# Patient Record
Sex: FEMALE | Race: OTHER | HISPANIC OR LATINO | Employment: FULL TIME | URBAN - METROPOLITAN AREA
[De-identification: names, ages, dates, MRNs, and addresses within clinical notes are randomized per-mention and may not be internally consistent; named-entity substitution may affect disease eponyms.]

---

## 2023-03-22 LAB
ALANINE AMINOTRANSFERASE (SGPT) (U/L) IN SER/PLAS: 12 U/L (ref 7–45)
ALBUMIN (G/DL) IN SER/PLAS: 4.1 G/DL (ref 3.4–5)
ALKALINE PHOSPHATASE (U/L) IN SER/PLAS: 77 U/L (ref 33–110)
ANION GAP IN SER/PLAS: 14 MMOL/L (ref 10–20)
ASPARTATE AMINOTRANSFERASE (SGOT) (U/L) IN SER/PLAS: 12 U/L (ref 9–39)
BASOPHILS (10*3/UL) IN BLOOD BY AUTOMATED COUNT: 0.04 X10E9/L (ref 0–0.1)
BASOPHILS/100 LEUKOCYTES IN BLOOD BY AUTOMATED COUNT: 0.5 % (ref 0–2)
BILIRUBIN TOTAL (MG/DL) IN SER/PLAS: 0.5 MG/DL (ref 0–1.2)
CALCIUM (MG/DL) IN SER/PLAS: 9.8 MG/DL (ref 8.6–10.6)
CARBON DIOXIDE, TOTAL (MMOL/L) IN SER/PLAS: 23 MMOL/L (ref 21–32)
CD3+CD4+ ABSOLUTE: 1.19 X10E9/L (ref 0.35–2.74)
CD3+CD8+ ABSOLUTE: 1.03 X10E9/L (ref 0.08–1.49)
CD4/CD8 RATIO: 1.16 (ref 1–3.5)
CD45%: 100 %
CHLORIDE (MMOL/L) IN SER/PLAS: 106 MMOL/L (ref 98–107)
CHOLESTEROL (MG/DL) IN SER/PLAS: 213 MG/DL (ref 0–199)
CHOLESTEROL IN HDL (MG/DL) IN SER/PLAS: 35.5 MG/DL
CHOLESTEROL/HDL RATIO: 6
CP CD3+CD4+%: 44 % (ref 29–57)
CP CD3+CD8+%: 38 % (ref 7–31)
CREATININE (MG/DL) IN SER/PLAS: 0.91 MG/DL (ref 0.5–1.05)
EOSINOPHILS (10*3/UL) IN BLOOD BY AUTOMATED COUNT: 0.12 X10E9/L (ref 0–0.7)
EOSINOPHILS/100 LEUKOCYTES IN BLOOD BY AUTOMATED COUNT: 1.5 % (ref 0–6)
ERYTHROCYTE DISTRIBUTION WIDTH (RATIO) BY AUTOMATED COUNT: 12.4 % (ref 11.5–14.5)
ERYTHROCYTE MEAN CORPUSCULAR HEMOGLOBIN CONCENTRATION (G/DL) BY AUTOMATED: 33.4 G/DL (ref 32–36)
ERYTHROCYTE MEAN CORPUSCULAR VOLUME (FL) BY AUTOMATED COUNT: 98 FL (ref 80–100)
ERYTHROCYTES (10*6/UL) IN BLOOD BY AUTOMATED COUNT: 4.15 X10E12/L (ref 4–5.2)
ESTIMATED AVERAGE GLUCOSE FOR HBA1C: 100 MG/DL
FMETH: ABNORMAL
FSIT1: ABNORMAL
GFR FEMALE: 79 ML/MIN/1.73M2
GLUCOSE (MG/DL) IN SER/PLAS: 78 MG/DL (ref 74–99)
HEMATOCRIT (%) IN BLOOD BY AUTOMATED COUNT: 40.7 % (ref 36–46)
HEMOGLOBIN (G/DL) IN BLOOD: 13.6 G/DL (ref 12–16)
HEMOGLOBIN A1C/HEMOGLOBIN TOTAL IN BLOOD: 5.1 %
IMMATURE GRANULOCYTES/100 LEUKOCYTES IN BLOOD BY AUTOMATED COUNT: 0.2 % (ref 0–0.9)
LDL: 104 MG/DL (ref 0–99)
LEUKOCYTES (10*3/UL) IN BLOOD BY AUTOMATED COUNT: 8.1 X10E9/L (ref 4.4–11.3)
LYMPHOCYTES (10*3/UL) IN BLOOD BY AUTOMATED COUNT: 2.7 X10E9/L (ref 1.2–4.8)
LYMPHOCYTES/100 LEUKOCYTES IN BLOOD BY AUTOMATED COUNT: 33.3 % (ref 13–44)
MONOCYTES (10*3/UL) IN BLOOD BY AUTOMATED COUNT: 0.63 X10E9/L (ref 0.1–1)
MONOCYTES/100 LEUKOCYTES IN BLOOD BY AUTOMATED COUNT: 7.8 % (ref 2–10)
NEUTROPHILS (10*3/UL) IN BLOOD BY AUTOMATED COUNT: 4.6 X10E9/L (ref 1.2–7.7)
NEUTROPHILS/100 LEUKOCYTES IN BLOOD BY AUTOMATED COUNT: 56.7 % (ref 40–80)
NON HDL CHOLESTEROL: 178 MG/DL
NRBC (PER 100 WBCS) BY AUTOMATED COUNT: 0 /100 WBC (ref 0–0)
PLATELETS (10*3/UL) IN BLOOD AUTOMATED COUNT: 348 X10E9/L (ref 150–450)
POTASSIUM (MMOL/L) IN SER/PLAS: 3.9 MMOL/L (ref 3.5–5.3)
PROTEIN TOTAL: 6.7 G/DL (ref 6.4–8.2)
SODIUM (MMOL/L) IN SER/PLAS: 139 MMOL/L (ref 136–145)
SYPHILIS TOTAL AB: NONREACTIVE
TRIGLYCERIDE (MG/DL) IN SER/PLAS: 366 MG/DL (ref 0–149)
UREA NITROGEN (MG/DL) IN SER/PLAS: 11 MG/DL (ref 6–23)
VLDL: 73 MG/DL (ref 0–40)

## 2023-03-23 LAB
CHLAMYDIA TRACH., AMPLIFIED: NEGATIVE
HIV-1 RNA PCR VIRAL LOAD LOG: NORMAL LOG10 CPY/ML
HIV-1 RNA VIRAL LOAD: NOT DETECTED COPIES/ML
N. GONORRHEA, AMPLIFIED: NEGATIVE
TRICHOMONAS VAGINALIS: NEGATIVE

## 2023-08-15 PROBLEM — G89.29 CHRONIC LOW BACK PAIN: Status: ACTIVE | Noted: 2023-08-15

## 2023-08-15 PROBLEM — R45.4 IRRITABILITY AND ANGER: Status: ACTIVE | Noted: 2023-08-15

## 2023-08-15 PROBLEM — G47.00 INSOMNIA: Status: ACTIVE | Noted: 2023-08-15

## 2023-08-15 PROBLEM — F32.A DEPRESSION: Status: ACTIVE | Noted: 2023-08-15

## 2023-08-15 PROBLEM — M94.20 CHONDROMALACIA: Status: ACTIVE | Noted: 2023-08-15

## 2023-08-15 PROBLEM — F41.0 PANIC ATTACKS: Status: ACTIVE | Noted: 2023-08-15

## 2023-08-15 PROBLEM — M79.7 FIBROMYALGIA: Status: ACTIVE | Noted: 2023-08-15

## 2023-08-15 PROBLEM — R20.2 NUMBNESS AND TINGLING OF RIGHT THUMB: Status: ACTIVE | Noted: 2023-08-15

## 2023-08-15 PROBLEM — M25.562 KNEE PAIN, BILATERAL: Status: ACTIVE | Noted: 2023-08-15

## 2023-08-15 PROBLEM — M25.561 KNEE PAIN, BILATERAL: Status: ACTIVE | Noted: 2023-08-15

## 2023-08-15 PROBLEM — R41.89 ALTERATION IN COGNITION: Status: ACTIVE | Noted: 2023-08-15

## 2023-08-15 PROBLEM — E66.9 OBESITY: Status: ACTIVE | Noted: 2023-08-15

## 2023-08-15 PROBLEM — G56.00 CARPAL TUNNEL SYNDROME: Status: ACTIVE | Noted: 2023-08-15

## 2023-08-15 PROBLEM — F42.8 OBSESSIVE THINKING: Status: ACTIVE | Noted: 2023-08-15

## 2023-08-15 PROBLEM — M54.9 BACK PAIN: Status: ACTIVE | Noted: 2023-08-15

## 2023-08-15 PROBLEM — E66.01 MORBIDLY OBESE (MULTI): Status: ACTIVE | Noted: 2023-08-15

## 2023-08-15 PROBLEM — F41.9 ANXIETY: Status: ACTIVE | Noted: 2023-08-15

## 2023-08-15 PROBLEM — H52.13 MYOPIA OF BOTH EYES: Status: ACTIVE | Noted: 2023-08-15

## 2023-08-15 PROBLEM — F43.21 FEELING GRIEF: Status: ACTIVE | Noted: 2023-08-15

## 2023-08-15 PROBLEM — G62.9 PERIPHERAL NEUROPATHY: Status: ACTIVE | Noted: 2023-08-15

## 2023-08-15 PROBLEM — R40.4 NONSPECIFIC PAROXYSMAL SPELL: Status: ACTIVE | Noted: 2023-08-15

## 2023-08-15 PROBLEM — M19.90 OSTEOARTHRITIS: Status: ACTIVE | Noted: 2023-08-15

## 2023-08-15 PROBLEM — M54.50 CHRONIC LOW BACK PAIN: Status: ACTIVE | Noted: 2023-08-15

## 2023-08-15 PROBLEM — F43.10 PTSD (POST-TRAUMATIC STRESS DISORDER): Status: ACTIVE | Noted: 2023-08-15

## 2023-08-15 PROBLEM — E78.5 HYPERLIPIDEMIA: Status: ACTIVE | Noted: 2023-08-15

## 2023-08-15 PROBLEM — M54.12 CERVICAL RADICULOPATHY: Status: ACTIVE | Noted: 2023-08-15

## 2023-08-15 PROBLEM — H35.3131 EARLY DRY STAGE NONEXUDATIVE AGE-RELATED MACULAR DEGENERATION OF BOTH EYES: Status: ACTIVE | Noted: 2023-08-15

## 2023-08-15 PROBLEM — H53.9 VISION CHANGES: Status: ACTIVE | Noted: 2023-08-15

## 2023-08-15 PROBLEM — F44.5 PSYCHIATRIC PSEUDOSEIZURE: Status: ACTIVE | Noted: 2023-08-15

## 2023-08-15 PROBLEM — B20 HIV INFECTION (MULTI): Status: ACTIVE | Noted: 2023-08-15

## 2023-08-15 PROBLEM — R20.0 NUMBNESS AND TINGLING OF RIGHT THUMB: Status: ACTIVE | Noted: 2023-08-15

## 2023-08-15 PROBLEM — H52.03 HYPERMETROPIA OF BOTH EYES: Status: ACTIVE | Noted: 2023-08-15

## 2023-08-15 PROBLEM — F31.81 BIPOLAR 2 DISORDER (MULTI): Status: ACTIVE | Noted: 2023-08-15

## 2023-08-15 PROBLEM — Z21 HIV INFECTION: Status: ACTIVE | Noted: 2023-08-15

## 2023-08-15 PROBLEM — N93.9 ABNORMAL UTERINE AND VAGINAL BLEEDING, UNSPECIFIED: Status: ACTIVE | Noted: 2023-08-15

## 2023-08-15 PROBLEM — F44.9 DISASSOCIATION DISORDER: Status: ACTIVE | Noted: 2023-08-15

## 2023-08-15 PROBLEM — M54.2 CERVICAL PAIN (NECK): Status: ACTIVE | Noted: 2023-08-15

## 2023-08-15 RX ORDER — IBUPROFEN 600 MG/1
1 TABLET ORAL
COMMUNITY
Start: 2016-05-10 | End: 2024-01-19 | Stop reason: SDUPTHER

## 2023-08-15 RX ORDER — HYDROXYZINE HYDROCHLORIDE 25 MG/1
1 TABLET, FILM COATED ORAL 3 TIMES DAILY PRN
COMMUNITY
Start: 2020-03-27 | End: 2024-04-26 | Stop reason: SDUPTHER

## 2023-08-15 RX ORDER — TOPIRAMATE 25 MG/1
25 TABLET ORAL
COMMUNITY
Start: 2022-10-06

## 2023-08-15 RX ORDER — CYCLOBENZAPRINE HCL 5 MG
1 TABLET ORAL 3 TIMES DAILY PRN
COMMUNITY
Start: 2019-02-08 | End: 2024-01-19 | Stop reason: SDUPTHER

## 2023-08-15 RX ORDER — BREXPIPRAZOLE 3 MG/1
3 TABLET ORAL
COMMUNITY
Start: 2022-06-16 | End: 2023-11-14 | Stop reason: DRUGHIGH

## 2023-08-15 RX ORDER — BICTEGRAVIR SODIUM, EMTRICITABINE, AND TENOFOVIR ALAFENAMIDE FUMARATE 50; 200; 25 MG/1; MG/1; MG/1
1 TABLET ORAL DAILY
COMMUNITY
Start: 2018-12-07 | End: 2023-12-08

## 2023-08-15 RX ORDER — PREDNISONE 20 MG/1
20 TABLET ORAL
COMMUNITY
Start: 2021-07-01 | End: 2023-10-21 | Stop reason: ALTCHOICE

## 2023-08-15 RX ORDER — POLYETHYLENE GLYCOL 3350 17 G/17G
17 POWDER, FOR SOLUTION ORAL
COMMUNITY
Start: 2021-01-25

## 2023-08-15 RX ORDER — PNV NO.95/FERROUS FUM/FOLIC AC 28MG-0.8MG
1 TABLET ORAL
COMMUNITY
Start: 2020-10-21 | End: 2023-11-14 | Stop reason: WASHOUT

## 2023-08-15 RX ORDER — LIDOCAINE 50 MG/G
1 PATCH TOPICAL EVERY 12 HOURS
COMMUNITY
Start: 2023-06-08

## 2023-08-15 RX ORDER — ACETAMINOPHEN 325 MG/1
2 TABLET ORAL EVERY 6 HOURS
COMMUNITY
Start: 2021-06-11

## 2023-08-15 RX ORDER — TRAZODONE HYDROCHLORIDE 50 MG/1
1-2 TABLET ORAL DAILY
COMMUNITY
End: 2023-11-14 | Stop reason: WASHOUT

## 2023-08-15 RX ORDER — DIAZEPAM 5 MG/1
1 TABLET ORAL AS NEEDED
COMMUNITY
Start: 2022-07-14 | End: 2023-11-16 | Stop reason: WASHOUT

## 2023-08-15 RX ORDER — ALBUTEROL SULFATE 90 UG/1
1-2 AEROSOL, METERED RESPIRATORY (INHALATION)
COMMUNITY
Start: 2014-11-10

## 2023-08-15 RX ORDER — SERTRALINE HYDROCHLORIDE 100 MG/1
1 TABLET, FILM COATED ORAL DAILY
COMMUNITY
Start: 2022-11-10 | End: 2023-12-14 | Stop reason: SDUPTHER

## 2023-08-15 RX ORDER — OXYCODONE HYDROCHLORIDE 5 MG/1
1 TABLET ORAL EVERY 6 HOURS PRN
COMMUNITY
Start: 2021-06-11 | End: 2023-10-21 | Stop reason: ALTCHOICE

## 2023-08-21 PROBLEM — H52.4 MYOPIA WITH PRESBYOPIA: Status: ACTIVE | Noted: 2023-08-21

## 2023-08-21 PROBLEM — H11.442 CONJUNCTIVAL CYST OF LEFT EYE: Status: ACTIVE | Noted: 2023-08-21

## 2023-08-21 PROBLEM — Z79.899 HIGH RISK MEDICATION USE: Status: ACTIVE | Noted: 2023-08-21

## 2023-08-21 PROBLEM — E55.9 VITAMIN D DEFICIENCY: Status: ACTIVE | Noted: 2023-08-21

## 2023-08-21 PROBLEM — H52.10 MYOPIA WITH PRESBYOPIA: Status: ACTIVE | Noted: 2023-08-21

## 2023-09-26 PROBLEM — F44.5 DISSOCIATIVE CONVULSIONS: Status: ACTIVE | Noted: 2021-03-25

## 2023-09-26 PROBLEM — N93.9 ABNORMAL UTERINE BLEEDING: Status: ACTIVE | Noted: 2021-03-25

## 2023-09-26 PROBLEM — M25.521 RIGHT ELBOW PAIN: Status: ACTIVE | Noted: 2022-11-25

## 2023-09-26 PROBLEM — M77.11 LATERAL EPICONDYLITIS OF RIGHT ELBOW: Status: ACTIVE | Noted: 2022-11-25

## 2023-09-26 PROBLEM — B20 HUMAN IMMUNODEFICIENCY VIRUS INFECTION (MULTI): Status: ACTIVE | Noted: 2021-03-25

## 2023-09-26 PROBLEM — S93.409A SPRAIN OF ANKLE: Status: ACTIVE | Noted: 2020-05-22

## 2023-09-26 PROBLEM — Z21 HUMAN IMMUNODEFICIENCY VIRUS INFECTION: Status: ACTIVE | Noted: 2021-03-25

## 2023-09-26 PROBLEM — M54.9 BACKACHE: Status: ACTIVE | Noted: 2023-08-15

## 2023-09-26 PROBLEM — R45.851 SUICIDAL IDEATION: Status: ACTIVE | Noted: 2017-06-05

## 2023-09-26 PROBLEM — M19.90 OSTEOARTHROSIS: Status: ACTIVE | Noted: 2023-08-15

## 2023-09-26 RX ORDER — NAPROXEN 500 MG/1
500 TABLET ORAL
COMMUNITY
Start: 2020-03-09

## 2023-09-28 LAB
ALANINE AMINOTRANSFERASE (SGPT) (U/L) IN SER/PLAS: 33 U/L (ref 7–45)
ALBUMIN (G/DL) IN SER/PLAS: 4.1 G/DL (ref 3.4–5)
ALKALINE PHOSPHATASE (U/L) IN SER/PLAS: 76 U/L (ref 33–110)
ANION GAP IN SER/PLAS: 13 MMOL/L (ref 10–20)
ASPARTATE AMINOTRANSFERASE (SGOT) (U/L) IN SER/PLAS: 21 U/L (ref 9–39)
BASOPHILS (10*3/UL) IN BLOOD BY AUTOMATED COUNT: 0.04 X10E9/L (ref 0–0.1)
BASOPHILS/100 LEUKOCYTES IN BLOOD BY AUTOMATED COUNT: 0.6 % (ref 0–2)
BILIRUBIN TOTAL (MG/DL) IN SER/PLAS: 0.5 MG/DL (ref 0–1.2)
CALCIDIOL (25 OH VITAMIN D3) (NG/ML) IN SER/PLAS: 22 NG/ML
CALCIUM (MG/DL) IN SER/PLAS: 9.8 MG/DL (ref 8.6–10.6)
CARBON DIOXIDE, TOTAL (MMOL/L) IN SER/PLAS: 27 MMOL/L (ref 21–32)
CHLAMYDIA TRACH., AMPLIFIED: NEGATIVE
CHLORIDE (MMOL/L) IN SER/PLAS: 105 MMOL/L (ref 98–107)
CHOLESTEROL (MG/DL) IN SER/PLAS: 272 MG/DL (ref 0–199)
CHOLESTEROL IN HDL (MG/DL) IN SER/PLAS: 36.9 MG/DL
CHOLESTEROL/HDL RATIO: 7.4
CREATININE (MG/DL) IN SER/PLAS: 0.85 MG/DL (ref 0.5–1.05)
EOSINOPHILS (10*3/UL) IN BLOOD BY AUTOMATED COUNT: 0.22 X10E9/L (ref 0–0.7)
EOSINOPHILS/100 LEUKOCYTES IN BLOOD BY AUTOMATED COUNT: 3.2 % (ref 0–6)
ERYTHROCYTE DISTRIBUTION WIDTH (RATIO) BY AUTOMATED COUNT: 11.8 % (ref 11.5–14.5)
ERYTHROCYTE MEAN CORPUSCULAR HEMOGLOBIN CONCENTRATION (G/DL) BY AUTOMATED: 33.2 G/DL (ref 32–36)
ERYTHROCYTE MEAN CORPUSCULAR VOLUME (FL) BY AUTOMATED COUNT: 100 FL (ref 80–100)
ERYTHROCYTES (10*6/UL) IN BLOOD BY AUTOMATED COUNT: 4.11 X10E12/L (ref 4–5.2)
GFR FEMALE: 86 ML/MIN/1.73M2
GLUCOSE (MG/DL) IN SER/PLAS: 106 MG/DL (ref 74–99)
HEMATOCRIT (%) IN BLOOD BY AUTOMATED COUNT: 41 % (ref 36–46)
HEMOGLOBIN (G/DL) IN BLOOD: 13.6 G/DL (ref 12–16)
IMMATURE GRANULOCYTES/100 LEUKOCYTES IN BLOOD BY AUTOMATED COUNT: 0.3 % (ref 0–0.9)
LDL: ABNORMAL MG/DL (ref 0–99)
LEUKOCYTES (10*3/UL) IN BLOOD BY AUTOMATED COUNT: 6.8 X10E9/L (ref 4.4–11.3)
LYMPHOCYTES (10*3/UL) IN BLOOD BY AUTOMATED COUNT: 2.98 X10E9/L (ref 1.2–4.8)
LYMPHOCYTES/100 LEUKOCYTES IN BLOOD BY AUTOMATED COUNT: 43.6 % (ref 13–44)
MONOCYTES (10*3/UL) IN BLOOD BY AUTOMATED COUNT: 0.47 X10E9/L (ref 0.1–1)
MONOCYTES/100 LEUKOCYTES IN BLOOD BY AUTOMATED COUNT: 6.9 % (ref 2–10)
N. GONORRHEA, AMPLIFIED: NEGATIVE
NEUTROPHILS (10*3/UL) IN BLOOD BY AUTOMATED COUNT: 3.1 X10E9/L (ref 1.2–7.7)
NEUTROPHILS/100 LEUKOCYTES IN BLOOD BY AUTOMATED COUNT: 45.4 % (ref 40–80)
NON HDL CHOLESTEROL: 235 MG/DL
NRBC (PER 100 WBCS) BY AUTOMATED COUNT: 0 /100 WBC (ref 0–0)
PLATELETS (10*3/UL) IN BLOOD AUTOMATED COUNT: 293 X10E9/L (ref 150–450)
POTASSIUM (MMOL/L) IN SER/PLAS: 3.8 MMOL/L (ref 3.5–5.3)
PROTEIN TOTAL: 6.6 G/DL (ref 6.4–8.2)
SODIUM (MMOL/L) IN SER/PLAS: 141 MMOL/L (ref 136–145)
TRICHOMONAS VAGINALIS: NEGATIVE
TRIGLYCERIDE (MG/DL) IN SER/PLAS: 441 MG/DL (ref 0–149)
UREA NITROGEN (MG/DL) IN SER/PLAS: 12 MG/DL (ref 6–23)
VLDL: ABNORMAL MG/DL (ref 0–40)

## 2023-09-29 LAB
HIV-1 RNA PCR VIRAL LOAD LOG: NORMAL LOG10 CPY/ML
HIV-1 RNA VIRAL LOAD: NOT DETECTED COPIES/ML

## 2023-09-29 RX ORDER — ERGOCALCIFEROL 1.25 MG/1
1.25 CAPSULE ORAL
COMMUNITY

## 2023-09-30 LAB
CD3+CD4+ ABSOLUTE: 1.28 X10E9/L (ref 0.35–2.74)
CD3+CD8+ ABSOLUTE: 1.16 X10E9/L (ref 0.08–1.49)
CD4/CD8 RATIO: 1.1 (ref 1–3.5)
CD45%: 100 %
CP CD3+CD4+%: 43 % (ref 29–57)
CP CD3+CD8+%: 39 % (ref 7–31)
FMETH: ABNORMAL
FSIT1: ABNORMAL

## 2023-10-21 ENCOUNTER — OFFICE VISIT (OUTPATIENT)
Dept: OPHTHALMOLOGY | Facility: CLINIC | Age: 43
End: 2023-10-21
Payer: COMMERCIAL

## 2023-10-21 DIAGNOSIS — Z21 ASYMPTOMATIC HIV INFECTION, WITH NO HISTORY OF HIV-RELATED ILLNESS (MULTI): ICD-10-CM

## 2023-10-21 DIAGNOSIS — H35.3132 INTERMEDIATE STAGE NONEXUDATIVE AGE-RELATED MACULAR DEGENERATION OF BOTH EYES: Primary | ICD-10-CM

## 2023-10-21 DIAGNOSIS — H52.03 HYPEROPIA OF BOTH EYES: ICD-10-CM

## 2023-10-21 DIAGNOSIS — H52.223 REGULAR ASTIGMATISM OF BOTH EYES: ICD-10-CM

## 2023-10-21 DIAGNOSIS — H52.4 PRESBYOPIA: ICD-10-CM

## 2023-10-21 DIAGNOSIS — H11.442 CONJUNCTIVAL CYST OF LEFT EYE: ICD-10-CM

## 2023-10-21 PROCEDURE — 92134 CPTRZ OPH DX IMG PST SGM RTA: CPT | Mod: BILATERAL PROCEDURE | Performed by: OPTOMETRIST

## 2023-10-21 PROCEDURE — 99214 OFFICE O/P EST MOD 30 MIN: CPT | Performed by: OPTOMETRIST

## 2023-10-21 RX ORDER — MV-MIN/FA/VIT K/LUTEIN/ZEAXANT 200MCG-5MG
1 CAPSULE ORAL 2 TIMES DAILY
COMMUNITY

## 2023-10-21 ASSESSMENT — CONF VISUAL FIELD
OS_NORMAL: 1
OD_SUPERIOR_TEMPORAL_RESTRICTION: 0
OD_SUPERIOR_NASAL_RESTRICTION: 0
OS_INFERIOR_TEMPORAL_RESTRICTION: 0
METHOD: COUNTING FINGERS
OS_SUPERIOR_TEMPORAL_RESTRICTION: 0
OD_INFERIOR_NASAL_RESTRICTION: 0
OD_INFERIOR_TEMPORAL_RESTRICTION: 0
OS_SUPERIOR_NASAL_RESTRICTION: 0
OD_NORMAL: 1
OS_INFERIOR_NASAL_RESTRICTION: 0

## 2023-10-21 ASSESSMENT — TONOMETRY
OD_IOP_MMHG: 15
OS_IOP_MMHG: 16
IOP_METHOD: GOLDMANN APPLANATION

## 2023-10-21 ASSESSMENT — VISUAL ACUITY
OS_SC: 20/20
OD_SC: 20/20
METHOD: SNELLEN - LINEAR

## 2023-10-21 ASSESSMENT — ENCOUNTER SYMPTOMS: EYES NEGATIVE: 1

## 2023-10-21 ASSESSMENT — EXTERNAL EXAM - RIGHT EYE: OD_EXAM: NORMAL

## 2023-10-21 ASSESSMENT — EXTERNAL EXAM - LEFT EYE: OS_EXAM: NORMAL

## 2023-10-21 ASSESSMENT — SLIT LAMP EXAM - LIDS
COMMENTS: NORMAL
COMMENTS: NORMAL

## 2023-10-21 ASSESSMENT — CUP TO DISC RATIO
OS_RATIO: 0.3
OD_RATIO: 0.3

## 2023-10-21 NOTE — PROGRESS NOTES
Assessment/Plan   Diagnoses and all orders for this visit:  Intermediate stage nonexudative age-related macular degeneration of both eyes  -     OCT, Retina - OU - Both Eyes  Discussed imporantance of sun protection outdoors, not smoking, healthy diet and exercise. Gave [employer] grid for at home monitoring. RTC with any changed in vision/on grid. Continue taking AREDs 2 vitamin po bid. RTC 6 months for DFE and OCT macula, optional refraction.  Asymptomatic HIV infection, with no history of HIV-related illness (CMS/HCC)  No retinopathy.  Conjunctival cyst of left eye  Stable. Monitor.  Hyperopia of both eyes  Regular astigmatism of both eyes  Presbyopia  Reprinted spec Rx.

## 2023-11-14 ENCOUNTER — TELEMEDICINE (OUTPATIENT)
Dept: BEHAVIORAL HEALTH | Facility: CLINIC | Age: 43
End: 2023-11-14
Payer: COMMERCIAL

## 2023-11-14 DIAGNOSIS — F42.8 OBSESSIVE THINKING: ICD-10-CM

## 2023-11-14 DIAGNOSIS — F51.04 PSYCHOPHYSIOLOGICAL INSOMNIA: ICD-10-CM

## 2023-11-14 DIAGNOSIS — R45.4 IRRITABILITY AND ANGER: ICD-10-CM

## 2023-11-14 DIAGNOSIS — F31.81 BIPOLAR 2 DISORDER (MULTI): Primary | ICD-10-CM

## 2023-11-14 DIAGNOSIS — F44.9 DISASSOCIATION DISORDER: ICD-10-CM

## 2023-11-14 DIAGNOSIS — F43.10 PTSD (POST-TRAUMATIC STRESS DISORDER): ICD-10-CM

## 2023-11-14 DIAGNOSIS — F41.9 ANXIETY: ICD-10-CM

## 2023-11-14 PROCEDURE — 99214 OFFICE O/P EST MOD 30 MIN: CPT | Performed by: NURSE PRACTITIONER

## 2023-11-14 ASSESSMENT — PATIENT HEALTH QUESTIONNAIRE - PHQ9
2. FEELING DOWN, DEPRESSED OR HOPELESS: SEVERAL DAYS
5. POOR APPETITE OR OVEREATING: SEVERAL DAYS
8. MOVING OR SPEAKING SO SLOWLY THAT OTHER PEOPLE COULD HAVE NOTICED. OR THE OPPOSITE, BEING SO FIGETY OR RESTLESS THAT YOU HAVE BEEN MOVING AROUND A LOT MORE THAN USUAL: SEVERAL DAYS
6. FEELING BAD ABOUT YOURSELF - OR THAT YOU ARE A FAILURE OR HAVE LET YOURSELF OR YOUR FAMILY DOWN: SEVERAL DAYS
10. IF YOU CHECKED OFF ANY PROBLEMS, HOW DIFFICULT HAVE THESE PROBLEMS MADE IT FOR YOU TO DO YOUR WORK, TAKE CARE OF THINGS AT HOME, OR GET ALONG WITH OTHER PEOPLE: EXTREMELY DIFFICULT
7. TROUBLE CONCENTRATING ON THINGS, SUCH AS READING THE NEWSPAPER OR WATCHING TELEVISION: MORE THAN HALF THE DAYS
1. LITTLE INTEREST OR PLEASURE IN DOING THINGS: MORE THAN HALF THE DAYS
3. TROUBLE FALLING OR STAYING ASLEEP OR SLEEPING TOO MUCH: MORE THAN HALF THE DAYS
4. FEELING TIRED OR HAVING LITTLE ENERGY: NEARLY EVERY DAY
9. THOUGHTS THAT YOU WOULD BE BETTER OFF DEAD, OR OF HURTING YOURSELF: NOT AT ALL

## 2023-11-14 ASSESSMENT — ANXIETY QUESTIONNAIRES
4. TROUBLE RELAXING: SEVERAL DAYS
6. BECOMING EASILY ANNOYED OR IRRITABLE: MORE THAN HALF THE DAYS
3. WORRYING TOO MUCH ABOUT DIFFERENT THINGS: MORE THAN HALF THE DAYS
5. BEING SO RESTLESS THAT IT IS HARD TO SIT STILL: SEVERAL DAYS
2. NOT BEING ABLE TO STOP OR CONTROL WORRYING: NEARLY EVERY DAY
7. FEELING AFRAID AS IF SOMETHING AWFUL MIGHT HAPPEN: SEVERAL DAYS
1. FEELING NERVOUS, ANXIOUS, OR ON EDGE: MORE THAN HALF THE DAYS
IF YOU CHECKED OFF ANY PROBLEMS ON THIS QUESTIONNAIRE, HOW DIFFICULT HAVE THESE PROBLEMS MADE IT FOR YOU TO DO YOUR WORK, TAKE CARE OF THINGS AT HOME, OR GET ALONG WITH OTHER PEOPLE: VERY DIFFICULT
GAD7 TOTAL SCORE: 12

## 2023-11-14 NOTE — PROGRESS NOTES
"Adult Ambulatory Psychiatry Progress Note      Assessment/Plan     Impression:  Cherrie Chapa is a 43 y.o. female domiciled  with 2 children, on disability, who presents for follow up with CC of \"I'm ok, but the apartment building, and my brother - I am stressing about that stuff.\"    Plan: Reviewed and agreed that as she was experienced more mood swings, especially eric, to increase Rexulti and leave other medications and treatment plan in place. As patient is on medical THC, she is not going to be prescribed Diazepam going forward. BIN paperwork for apartment building sent in, and then forwarded onto patient to fill out her sections and send onto the apartment management.  Medication: Increases Rexulti 3mg to 4mg at bedtime, continues Zoloft 150mg every day. Discontinuing Diazepam altogether due to being on prescribed medical THC, Atarax 25-50mg as needed for anxiety spikes and for additional sleep aid.    Reviewed r/b/a, possible side effects of the medication. Client is aware about the benefit outweighs the risk.     Diagnoses and all orders for this visit:  Bipolar 2 disorder (CMS/HCC)  -     brexpiprazole 4 mg tablet; Take 4 mg by mouth once daily.  Anxiety  Disassociation disorder  Irritability and anger  Obsessive thinking  PTSD (post-traumatic stress disorder)  Psychophysiological insomnia      Therapy: none    Other: n/a    Patient is reminded that if there is SI to call [phone] and get themselves to the closest ED for evaluation, otherwise contact me for other questions/concerns.     Subjective   HPI:  \"Both the patient, and family and caregivers and guardians as appropriate, were informed of the current need to conduct treatment via telephone. I have confirmed the patient's identity via the following (minimum of three) acceptable identifiers as per  Policy PH-9: , [place] home address.\"     Present Illness - anxiety, irritability and anger, mood changes     Characteristics/Recent psychiatric " "symptoms (pertinent positives and negatives) - reports no change with ongoing conflicts with housing/manager who is wanting to evict her but then will recertify her then flips on wanting to evict her again. Reports still deal with looking for a new apartment, and has  (Cristhian and she reports that he is very nice and very helpful.) assist her with this issue, while trying to stay on top of bills. Reports paying her rent but it is going to the courts and placed in escrow while mediation is being worked on. She does admit to having to deal with her son's ADHD, anxiety issues (as well as getting her son to bed on time and waking him up on time for school but the time change in the fall has helped him), and that is compounding her stress with the issues in housing. Reports her parents are doing ok, but her one brother who has schizophrenia is becoming an issue where he is back to drinking and using drugs and asking for money from their parents and the patient has told them that she will call the police on him if he doesn't stop. Still seeing her therapist Kera every week if not 2x a week 'as she is my lifeline'. Still working on her OCD but is often finding herself forgetting where she puts items, and then will get so anxious because she can't find the items, that she will go 'blank' and disassociates - all of this is to being discussed with her therapist as well. \"I get so overwhelmed so easily and then I glitch, I get scared and I have to talk myself down. The past couple of weeks have been rough for me, as it was impacting my sleep.' Reports having to rely on her Atarax more during the day to calm down her anxiety and then will let me sleep better. Reports lately only averaging 3 hours a night and no naps. Reports noticing she has been more manic lately and feels drinking 'caffeine is messing me up. I am switching to decafe more. I am noticing more irritability as well.' Reports noticing an increase in " thoughts of leading to impulsive decisions and working on trying to catch herself before she actually acts on them, 'and go off the deep end.'      Onset/timeframe - 1 month  Type - anxiety, depression/manic, PTSD  Duration - daily  Aggravating and/or relieving factors/triggers - feeling overwhelmed by too many things going on in her life (fighting apartment people to not be evicted while also trying to get a cat/BIN), dealing with her family/specifically her mentally ill brother, and often finds herself disconnecting from reality more often than usual.  Related symptoms  Treatment and treatment changes (new meds, dosage increases or decreases, med compliance, therapy frequency, etc.) (Past and Recent) - She sees Megan Landry, her therapist twice a week with [facility] - CBT and psychodynamic therapy. Rexulti 3mg @[facility], Zoloft 150mg QD, Diazepam 5mg 1 tablet PRN daily (on hold), Atarax 25-50mg PRN. Topamax 75mg BID (managed by pain management doctor) for neck pain     Issues: Denies SI/HI/AVH.     Still on HIV medications. Dealing with pain from Fibromyalgia and uses her medications appropriately.     Reports her 1 year death anniversary for her close friend had just happened.             Review of Systems   Respiratory: Negative.     Gastrointestinal: Negative.    Genitourinary: Negative.    Musculoskeletal: Negative.    Allergic/Immunologic: Positive for immunocompromised state.       OARRS:  Moody Peoples, APRN-CNP on 11/16/2023 10:36 PM  I have personally reviewed the OARRS report for Cherrie Chapa. I have considered the risks of abuse, dependence, addiction and diversion    Is the patient prescribed a combination of a benzodiazepine and opioid?  No    Last Urine Drug Screen / ordered today: No  No results found for this or any previous visit (from the past 8760 hour(s)).  N/A    Clinical rationale for not completing a Urine Drug Screen: Valium was discontinued as patient is prescribed medical  THC      Controlled Substance Agreement:  Date of the Last Agreement: N/A    Objective   Mental Status Exam:  General Appearance: Well groomed, appropriate eye contact  Attitude/Behavior: Cooperative  Motor: Fidgeting  Speech: Emphatic speech, normal rate and prosody  Gait/Station: Other:(comment) (sitting on couch over ZOOM)  Mood: 'stressed lately'  Affect: Flat, Anxious, Expansive, Congruent with mood and topic of conversation  Thought Process: Flight of ideas, Perseverative, Thought blocking  Thought Associations: Mild loosening of associations  Thought Content: Other: (comment) (worrying about many issues going on in her life at one time and finding herself often more manic and disassociating more frequently.)  Perception: Depersonalization  Sensorium: Alert and oriented to person, place, time and situation  Insight: Fair  Judgement: Fair  Cognition: Cognitively intact to conversational testing with respect to attention, orientation, fund of knowledge, recent and remote memory, and language  Testing: N/A    TIM-7/PHQ-9 scores reviewed: 12, 13 compared to 19, 13 (August 2023) reflecting improved anxiety and stable mood/depression.        Current Medications:  Current Outpatient Medications on File Prior to Visit   Medication Sig Dispense Refill    hydrOXYzine HCL (Atarax) 25 mg tablet Take 1 tablet (25 mg) by mouth 3 times a day as needed.      sertraline (Zoloft) 100 mg tablet Take 1 tablet (100 mg) by mouth once daily. 1 and .5 tablets daily      [DISCONTINUED] diazePAM (Valium) 5 mg tablet Take 1 tablet (5 mg) by mouth if needed for anxiety (anxiety attacks).      acetaminophen (Tylenol) 325 mg tablet Take 2 tablets (650 mg) by mouth every 6 hours.      albuterol (Ventolin HFA) 90 mcg/actuation inhaler Inhale 1-2 puffs. Every 4-6 hours      xrmylwllo-wbayyiln-egksedu ala (Biktarvy) -25 mg tablet Take 1 tablet by mouth once daily.      cyclobenzaprine (Flexeril) 5 mg tablet Take 1 tablet (5 mg) by mouth  3 times a day as needed.      ergocalciferol (Vitamin D-2) 1.25 MG (52933 UT) capsule Take 1 capsule (1,250 mcg) by mouth 1 (one) time per week.      ibuprofen 600 mg tablet Take 1 tablet (600 mg) by mouth 3 times a day with meals.      lidocaine (Lidoderm) 5 % patch Place 1 patch on the skin every 12 hours. (Leave on for 12 hours, then remove and leave off for 12 hours)      mv-min-FA-vit K-lutein-zeaxant (PreserVision AREDS 2 Plus MV) 200 mcg-15 mcg- 5 mg-1 mg capsule Take 1 tablet by mouth 2 times a day.      naproxen (Naprosyn) 500 mg tablet Take 1 tablet (500 mg) by mouth 2 times a day with meals.      polyethylene glycol (Glycolax, Miralax) 17 gram/dose powder Take 17 g by mouth. 17 gram/dose powder      topiramate (Topamax) 25 mg tablet Take 1 tablet (25 mg) by mouth. Take per titration, fax to pharmacy      [DISCONTINUED] brexpiprazole (Rexulti) 1 mg tablet Take 2 tablets (2 mg) by mouth. bedtime      [DISCONTINUED] brexpiprazole (Rexulti) 3 mg tablet Take 3 mg by mouth. At bedtime      [DISCONTINUED] cyanocobalamin (Vitamin B-12) 100 mcg tablet Take 1 tablet (100 mcg) by mouth. As directed      [DISCONTINUED] traZODone (Desyrel) 50 mg tablet Take 1-2 tablets ( mg) by mouth once daily. (At bedtime)-for insomnia/ sleep       No current facility-administered medications on file prior to visit.       Lab Review:   Orders Only on 09/28/2023   Component Date Value    HIV-1 RNA Viral Load 09/28/2023 NOT DETECTED     HIV-1 RNA PCR Viral Load* 09/28/2023 NOT CALCULATED     Vitamin D, 25-Hydroxy 09/28/2023 22 (A)     Cholesterol 09/28/2023 272 (H)     HDL 09/28/2023 36.9 (A)     Cholesterol/HDL Ratio 09/28/2023 7.4 (A)     LDL 09/28/2023 -     VLDL 09/28/2023 SEE COMMENT     Triglycerides 09/28/2023 441 (H)     Non HDL Cholesterol 09/28/2023 235     Neisseria gonorrhea,Ampl* 09/28/2023 NEGATIVE     Chlamydia trachomatis, A* 09/28/2023 NEGATIVE     Glucose 09/28/2023 106 (H)     Sodium 09/28/2023 141      Potassium 09/28/2023 3.8     Chloride 09/28/2023 105     Bicarbonate 09/28/2023 27     Anion Gap 09/28/2023 13     Urea Nitrogen 09/28/2023 12     Creatinine 09/28/2023 0.85     GFR Female 09/28/2023 86     Calcium 09/28/2023 9.8     Albumin 09/28/2023 4.1     Alkaline Phosphatase 09/28/2023 76     Total Protein 09/28/2023 6.6     AST 09/28/2023 21     Total Bilirubin 09/28/2023 0.5     ALT (SGPT) 09/28/2023 33     Trichomonas Vaginalis 09/28/2023 NEGATIVE     Site 09/28/2023 BLOOD     CD3+CD4+% 09/28/2023 43     CD3+CD4+ Absolute 09/28/2023 1.281     CD3+CD8+% 09/28/2023 39 (H)     CD3+CD8+ Absolute 09/28/2023 1.162     CD4/CD8 Ratio 09/28/2023 1.10     CD45% 09/28/2023 100     Method 09/28/2023 SEE BELOW     WBC 09/28/2023 6.8     nRBC 09/28/2023 0.0     RBC 09/28/2023 4.11     Hemoglobin 09/28/2023 13.6     Hematocrit 09/28/2023 41.0     MCV 09/28/2023 100     MCHC 09/28/2023 33.2     Platelets 09/28/2023 293     RDW 09/28/2023 11.8     Neutrophils % 09/28/2023 45.4     Immature Granulocytes %,* 09/28/2023 0.3     Lymphocytes % 09/28/2023 43.6     Monocytes % 09/28/2023 6.9     Eosinophils % 09/28/2023 3.2     Basophils % 09/28/2023 0.6     Neutrophils Absolute 09/28/2023 3.10     Lymphocytes Absolute 09/28/2023 2.98     Monocytes Absolute 09/28/2023 0.47     Eosinophils Absolute 09/28/2023 0.22     Basophils Absolute 09/28/2023 0.04          Time Spent:    Prep time: 1 min.  Direct patient time: 31 min.  Documentation time: 7 min.  Total time: 39 min.    Next Appointment:  Follow up in about 4 weeks (around 12/12/2023).

## 2023-11-16 PROBLEM — F32.A DEPRESSION: Status: RESOLVED | Noted: 2023-08-15 | Resolved: 2023-11-16

## 2023-11-16 ASSESSMENT — ENCOUNTER SYMPTOMS
GASTROINTESTINAL NEGATIVE: 1
MUSCULOSKELETAL NEGATIVE: 1
RESPIRATORY NEGATIVE: 1

## 2023-12-05 ENCOUNTER — SOCIAL WORK (OUTPATIENT)
Dept: IMMUNOLOGY | Facility: CLINIC | Age: 43
End: 2023-12-05
Payer: COMMERCIAL

## 2023-12-08 DIAGNOSIS — B20 HIV INFECTION, UNSPECIFIED SYMPTOM STATUS (MULTI): Primary | ICD-10-CM

## 2023-12-08 RX ORDER — BICTEGRAVIR SODIUM, EMTRICITABINE, AND TENOFOVIR ALAFENAMIDE FUMARATE 50; 200; 25 MG/1; MG/1; MG/1
1 TABLET ORAL DAILY
Qty: 30 TABLET | Refills: 5 | Status: SHIPPED | OUTPATIENT
Start: 2023-12-08

## 2023-12-11 NOTE — PROGRESS NOTES
[facility]    Mental Health Counseling Session Update - Protected Health Information        Date: 12-5-23   Virtual telehealth session with Ms. Chapa                   Total Time:  60 min                             Progress:  Good/ Fair                 Compliant with Psych. Meds:  Yes      Therapeutic Modality:  Supportive               DBT           Factors regarding Medical treatment:    N/A  Changes (if any) to Initial Assessment:                  Treatment Modality:  Individual    Suicidal/ Homicidal Concerns: N  Factors aiding or impeding psychotherapy progress:   came    Processed client's thoughts, feelings, and actions.    Focused on distress tolerance and interpersonal effectiveness.   Explored ideas, choices, resources, preparedness and decision-making.    Looked at mindfulness tools.          Next Session:   12-12-23    Kera Landry [facility]  Mental Health Counselor

## 2023-12-12 ENCOUNTER — TELEPHONE (OUTPATIENT)
Dept: IMMUNOLOGY | Facility: CLINIC | Age: 43
End: 2023-12-12
Payer: COMMERCIAL

## 2023-12-12 NOTE — TELEPHONE ENCOUNTER
12-12-23    Ms. Chapa did not log into the virtual telehealth platform link for today's scheduled telehealth session.  Two phone attempts made, voice messages left for client.   Return voice message received from Ms. Chapa. She confirmed her next session time on 12-19-23.    Kera Landry, [facility]  Mental Health Counselor

## 2023-12-14 ENCOUNTER — TELEMEDICINE (OUTPATIENT)
Dept: BEHAVIORAL HEALTH | Facility: CLINIC | Age: 43
End: 2023-12-14
Payer: COMMERCIAL

## 2023-12-14 DIAGNOSIS — F43.21 FEELING GRIEF: ICD-10-CM

## 2023-12-14 DIAGNOSIS — R45.4 IRRITABILITY AND ANGER: ICD-10-CM

## 2023-12-14 DIAGNOSIS — F31.81 BIPOLAR 2 DISORDER (MULTI): ICD-10-CM

## 2023-12-14 DIAGNOSIS — F42.8 OBSESSIVE THINKING: ICD-10-CM

## 2023-12-14 DIAGNOSIS — F43.10 PTSD (POST-TRAUMATIC STRESS DISORDER): ICD-10-CM

## 2023-12-14 DIAGNOSIS — F44.9 DISASSOCIATION DISORDER: ICD-10-CM

## 2023-12-14 DIAGNOSIS — F41.9 ANXIETY: Primary | ICD-10-CM

## 2023-12-14 DIAGNOSIS — F51.04 PSYCHOPHYSIOLOGICAL INSOMNIA: ICD-10-CM

## 2023-12-14 PROCEDURE — 99214 OFFICE O/P EST MOD 30 MIN: CPT | Performed by: NURSE PRACTITIONER

## 2023-12-14 RX ORDER — SERTRALINE HYDROCHLORIDE 100 MG/1
100 TABLET, FILM COATED ORAL DAILY
Qty: 90 TABLET | Refills: 3 | Status: SHIPPED | OUTPATIENT
Start: 2023-12-14 | End: 2024-12-13

## 2023-12-14 ASSESSMENT — PATIENT HEALTH QUESTIONNAIRE - PHQ9
5. POOR APPETITE OR OVEREATING: MORE THAN HALF THE DAYS
2. FEELING DOWN, DEPRESSED OR HOPELESS: MORE THAN HALF THE DAYS
7. TROUBLE CONCENTRATING ON THINGS, SUCH AS READING THE NEWSPAPER OR WATCHING TELEVISION: NEARLY EVERY DAY
10. IF YOU CHECKED OFF ANY PROBLEMS, HOW DIFFICULT HAVE THESE PROBLEMS MADE IT FOR YOU TO DO YOUR WORK, TAKE CARE OF THINGS AT HOME, OR GET ALONG WITH OTHER PEOPLE: EXTREMELY DIFFICULT
3. TROUBLE FALLING OR STAYING ASLEEP OR SLEEPING TOO MUCH: MORE THAN HALF THE DAYS
8. MOVING OR SPEAKING SO SLOWLY THAT OTHER PEOPLE COULD HAVE NOTICED. OR THE OPPOSITE, BEING SO FIGETY OR RESTLESS THAT YOU HAVE BEEN MOVING AROUND A LOT MORE THAN USUAL: SEVERAL DAYS
9. THOUGHTS THAT YOU WOULD BE BETTER OFF DEAD, OR OF HURTING YOURSELF: SEVERAL DAYS
4. FEELING TIRED OR HAVING LITTLE ENERGY: MORE THAN HALF THE DAYS
1. LITTLE INTEREST OR PLEASURE IN DOING THINGS: NEARLY EVERY DAY
6. FEELING BAD ABOUT YOURSELF - OR THAT YOU ARE A FAILURE OR HAVE LET YOURSELF OR YOUR FAMILY DOWN: NEARLY EVERY DAY

## 2023-12-14 ASSESSMENT — ANXIETY QUESTIONNAIRES
6. BECOMING EASILY ANNOYED OR IRRITABLE: MORE THAN HALF THE DAYS
GAD7 TOTAL SCORE: 13
IF YOU CHECKED OFF ANY PROBLEMS ON THIS QUESTIONNAIRE, HOW DIFFICULT HAVE THESE PROBLEMS MADE IT FOR YOU TO DO YOUR WORK, TAKE CARE OF THINGS AT HOME, OR GET ALONG WITH OTHER PEOPLE: VERY DIFFICULT
2. NOT BEING ABLE TO STOP OR CONTROL WORRYING: NEARLY EVERY DAY
7. FEELING AFRAID AS IF SOMETHING AWFUL MIGHT HAPPEN: MORE THAN HALF THE DAYS
4. TROUBLE RELAXING: SEVERAL DAYS
5. BEING SO RESTLESS THAT IT IS HARD TO SIT STILL: SEVERAL DAYS
3. WORRYING TOO MUCH ABOUT DIFFERENT THINGS: NEARLY EVERY DAY
1. FEELING NERVOUS, ANXIOUS, OR ON EDGE: SEVERAL DAYS

## 2023-12-14 ASSESSMENT — ENCOUNTER SYMPTOMS
GASTROINTESTINAL NEGATIVE: 1
HEMATOLOGIC/LYMPHATIC NEGATIVE: 1
RESPIRATORY NEGATIVE: 1

## 2023-12-14 NOTE — PROGRESS NOTES
"Adult Ambulatory Psychiatry Progress Note      Assessment/Plan     Impression:  Cherrie Chapa is a 43 y.o. female domiciled  with 2 children, on disability, who presents for follow up with CC of \"I'm ok, but my biggest thing is the motivation and the sleepless nights here and there. I am trying and doing my therapy weekly. I am doing DBT worksheets and other times have talk sessions.\"    Plan: Reviewed and agreed to leaving medications and treatment plan in place. Reminded patient that if she wants Diazepam going forward, she needs to be clear of THC altogether.  Continues to see her therapist, Ms. Landry weekly.    Medication: Rexulti 4mg at bedtime, Zoloft 150mg every day. Discontinuing Diazepam altogether due to being on prescribed medical THC, Atarax 25-50mg as needed for anxiety spikes and for additional sleep aid.    Reviewed r/b/a, possible side effects of the medication. Client is aware about the benefit outweighs the risk.     Diagnoses and all orders for this visit:  Anxiety  -     sertraline (Zoloft) 100 mg tablet; Take 1 tablet (100 mg) by mouth once daily. 1 and .5 tablets daily  Bipolar 2 disorder (CMS/HCC)  -     sertraline (Zoloft) 100 mg tablet; Take 1 tablet (100 mg) by mouth once daily. 1 and .5 tablets daily  PTSD (post-traumatic stress disorder)  -     sertraline (Zoloft) 100 mg tablet; Take 1 tablet (100 mg) by mouth once daily. 1 and .5 tablets daily  Obsessive thinking  -     sertraline (Zoloft) 100 mg tablet; Take 1 tablet (100 mg) by mouth once daily. 1 and .5 tablets daily  Disassociation disorder  Feeling grief  Irritability and anger  Psychophysiological insomnia      Therapy: none    Other: n/a    Patient is reminded that if there is SI to call [phone] and get themselves to the closest ED for evaluation, otherwise contact me for other questions/concerns.     Subjective   HPI:  \"Both the patient, and family and caregivers and guardians as appropriate, were informed of the current " "need to conduct treatment via telephone. I have confirmed the patient's identity via the following (minimum of three) acceptable identifiers as per  Policy PH-9: , [place] home address.\"     Present Illness - anxiety, irritability and anger     Characteristics/Recent psychiatric symptoms (pertinent positives and negatives) - reports still working on her goal of being more active for herself, motivating herself to starting a daily routine - being more physical (gym but doesn't want to be around others as the thought 'makes me anxious' but perhaps a stationary bike) and getting out of her apartment. Repors with being one year out of losing her closest friend Lisa, her grief has kept her being isolated to her apartment d/t not having many friends and Lisa and the patient had been friends for the past 30 years and would talk daily over the phone. Reports knowing she needs to break the cycle of feeling stuck in her grief, in order to move forward as her grief does keep her depressed. Reports her son is adjusting to being in high school (now participating in medical programming through [employer] and also some mental health/therapy - vocation schooling through the arts) and her daughter who is now 21, is going to school for billing and coding and is working on home health care aide and is helping care for her grandmother (patient's mother). Reports still working on her legal issues related to the housing circumstances that are triggering her anxiety and depression even though these are issues that outside of her control, along with having to deal with a neighbor's personal 'beef' issues with her, and fear of being homeless. Still has conflicts with housing/manager who is coming across to try and manipulate the situation (going through her daughter) but is letter her  (Cristhian) manage the entire situation. Reports currently hoping to get a house through [facility] [employer] so that she and her kids have a " "place they can call of their own. Reports her parents are doing ok, but her one brother who has schizophrenia is an issue but her parents are now not wanting to talk about his issues with the patient 'and I guess that's fine where they are just keeping me in the dark. It is probably for the best as it doesn't stress me out like it had been before. Maybe I just need to fake it to make it attitude.' Still seeing her therapist Kera every week if not 2x a week and her therapist reminds her to continue to take her medications. Still working on her OCD. Can still find herself when feeling anxious and overwhelmed, that she will 'glitch' and disassociates and can't remember where things went. Reports with the increased dose of Rexulti, she is noticing she less testing, irritable or angry and not manic. Reports she will now make effort to now prepare for herself the next day 'with preparing for success the next day. I put everything down the night before, for the next day, so that I don't overwhelm or get myself upset. I don't want to see myself regress and get upset.' Reports she will make sure to sit still and let any sense of pacing and getting herself wound up and upset to fade away. Reports not having to rely on her Atarax during the day to calm down her anxiety but will use it at night more, when her anxiety kicks in at night and 'I will feel more itchy and it helps me relax.' Reports sleep is 5 or less or 'none' but has trouble identifying what could be causing this. \"Maybe it is the anxiety and I don't realize it so I probably should journal my thoughts. Perhaps I should take the Hydroxyzine at night to help me sleep more.' Reports noticing some racing, and obsessive thoughts, but they are not 'too crazy even though they are an every day thing.' Inquired into refilling Diazapam but is still taking THC.      Onset/timeframe - 1 month  Type - anxiety, depression   Duration - daily  Aggravating and/or relieving " factors/triggers - increased dose in Rexulti is helping with mood, but feels stressed still in general w/current housing issues though being handled via legal means, and missing her best friend still.  Related symptoms  Treatment and treatment changes (new meds, dosage increases or decreases, med compliance, therapy frequency, etc.) (Past and Recent) - She sees Megan Landry, her therapist twice a week [facility] - CBT and psychodynamic therapy. Rexulti 4mg @[facility], Zoloft 150mg QD, Diazepam 5mg 1 tablet PRN daily (on hold), Atarax 25-50mg PRN. Topamax 75mg BID (managed by pain management doctor) for neck pain     Issues: Denies SI/HI/AVH.     Still on HIV medications. Dealing with pain from Fibromyalgia and uses her medications appropriately.     Reports her 1 year death anniversary for her close friend had just happened.             Review of Systems   Respiratory: Negative.     Gastrointestinal: Negative.    Genitourinary: Negative.    Allergic/Immunologic: Positive for immunocompromised state.   Hematological: Negative.        OARRS:  Moody Peoples, APRN-CNP on 12/14/2023  1:58 PM  I have personally reviewed the OARRS report for Cherrie Chapa. I have considered the risks of abuse, dependence, addiction and diversion    Is the patient prescribed a combination of a benzodiazepine and opioid?  No    Last Urine Drug Screen / ordered today: No  No results found for this or any previous visit (from the past 8760 hour(s)).  N/A    Clinical rationale for not completing a Urine Drug Screen: Valium was discontinued as patient is prescribed medical THC      Controlled Substance Agreement:  Date of the Last Agreement: N/A    Objective   Mental Status Exam:  General Appearance: Well groomed, appropriate eye contact  Attitude/Behavior: Cooperative  Motor: Fidgeting  Speech: Rapid Speech, pressured  Gait/Station: Other:(comment) (sitting in car over virtual connection)  Mood: 'anxious and down'  Affect:  Constricted, Blunted, Anxious, Congruent with mood and topic of conversation  Thought Process: Linear, goal directed, Perseverative, Thought blocking, Flight of ideas  Thought Associations: Mild loosening of associations  Thought Content: Normal, Other: (comment) (grieving still, worrying about housing issues)  Perception: Depersonalization  Sensorium: Alert and oriented to person, place, time and situation  Insight: Fair  Judgement: Fair  Cognition: Cognitively intact to conversational testing with respect to attention, orientation, fund of knowledge, recent and remote memory, and language  Testing: N/A    TIM-7/PHQ-9 scores reviewed: 13, 20 compared to 12, 13 (August 2023) reflecting a mild bump in anxiety but a return in depression.        Current Medications:  Current Outpatient Medications on File Prior to Visit   Medication Sig Dispense Refill    brexpiprazole 4 mg tablet Take 4 mg by mouth once daily. 60 tablet 0    hydrOXYzine HCL (Atarax) 25 mg tablet Take 1 tablet (25 mg) by mouth 3 times a day as needed.      [DISCONTINUED] sertraline (Zoloft) 100 mg tablet Take 1 tablet (100 mg) by mouth once daily. 1 and .5 tablets daily      acetaminophen (Tylenol) 325 mg tablet Take 2 tablets (650 mg) by mouth every 6 hours.      albuterol (Ventolin HFA) 90 mcg/actuation inhaler Inhale 1-2 puffs. Every 4-6 hours      Biktarvy -25 mg tablet take 1 tablet by mouth once daily 30 tablet 5    cyclobenzaprine (Flexeril) 5 mg tablet Take 1 tablet (5 mg) by mouth 3 times a day as needed.      ergocalciferol (Vitamin D-2) 1.25 MG (88656 UT) capsule Take 1 capsule (1,250 mcg) by mouth 1 (one) time per week.      ibuprofen 600 mg tablet Take 1 tablet (600 mg) by mouth 3 times a day with meals.      lidocaine (Lidoderm) 5 % patch Place 1 patch on the skin every 12 hours. (Leave on for 12 hours, then remove and leave off for 12 hours)      mv-min-FA-vit K-lutein-zeaxant (PreserVision AREDS 2 Plus MV) 200 mcg-15 mcg- 5 mg-1  mg capsule Take 1 tablet by mouth 2 times a day.      naproxen (Naprosyn) 500 mg tablet Take 1 tablet (500 mg) by mouth 2 times a day with meals.      polyethylene glycol (Glycolax, Miralax) 17 gram/dose powder Take 17 g by mouth. 17 gram/dose powder      topiramate (Topamax) 25 mg tablet Take 1 tablet (25 mg) by mouth. Take per titration, fax to pharmacy      [DISCONTINUED] cerrvvmld-dygccegw-pxtdaod ala (Biktarvy) -25 mg tablet Take 1 tablet by mouth once daily.       No current facility-administered medications on file prior to visit.       Lab Review:   Orders Only on 09/28/2023   Component Date Value    HIV-1 RNA Viral Load 09/28/2023 NOT DETECTED     HIV-1 RNA PCR Viral Load* 09/28/2023 NOT CALCULATED     Vitamin D, 25-Hydroxy 09/28/2023 22 (A)     Cholesterol 09/28/2023 272 (H)     HDL 09/28/2023 36.9 (A)     Cholesterol/HDL Ratio 09/28/2023 7.4 (A)     LDL 09/28/2023 -     VLDL 09/28/2023 SEE COMMENT     Triglycerides 09/28/2023 441 (H)     Non HDL Cholesterol 09/28/2023 235     Neisseria gonorrhea,Ampl* 09/28/2023 NEGATIVE     Chlamydia trachomatis, A* 09/28/2023 NEGATIVE     Glucose 09/28/2023 106 (H)     Sodium 09/28/2023 141     Potassium 09/28/2023 3.8     Chloride 09/28/2023 105     Bicarbonate 09/28/2023 27     Anion Gap 09/28/2023 13     Urea Nitrogen 09/28/2023 12     Creatinine 09/28/2023 0.85     GFR Female 09/28/2023 86     Calcium 09/28/2023 9.8     Albumin 09/28/2023 4.1     Alkaline Phosphatase 09/28/2023 76     Total Protein 09/28/2023 6.6     AST 09/28/2023 21     Total Bilirubin 09/28/2023 0.5     ALT (SGPT) 09/28/2023 33     Trichomonas Vaginalis 09/28/2023 NEGATIVE     Site 09/28/2023 BLOOD     CD3+CD4+% 09/28/2023 43     CD3+CD4+ Absolute 09/28/2023 1.281     CD3+CD8+% 09/28/2023 39 (H)     CD3+CD8+ Absolute 09/28/2023 1.162     CD4/CD8 Ratio 09/28/2023 1.10     CD45% 09/28/2023 100     Method 09/28/2023 SEE BELOW     WBC 09/28/2023 6.8     nRBC 09/28/2023 0.0     RBC 09/28/2023  4.11     Hemoglobin 09/28/2023 13.6     Hematocrit 09/28/2023 41.0     MCV 09/28/2023 100     MCHC 09/28/2023 33.2     Platelets 09/28/2023 293     RDW 09/28/2023 11.8     Neutrophils % 09/28/2023 45.4     Immature Granulocytes %,* 09/28/2023 0.3     Lymphocytes % 09/28/2023 43.6     Monocytes % 09/28/2023 6.9     Eosinophils % 09/28/2023 3.2     Basophils % 09/28/2023 0.6     Neutrophils Absolute 09/28/2023 3.10     Lymphocytes Absolute 09/28/2023 2.98     Monocytes Absolute 09/28/2023 0.47     Eosinophils Absolute 09/28/2023 0.22     Basophils Absolute 09/28/2023 0.04          Time Spent:    Prep time: 1 min.  Direct patient time: 31 min.  Documentation time: 6 min.  Total time: 38 min.    Next Appointment:  Follow up in about 6 weeks (around 1/25/2024).

## 2023-12-19 ENCOUNTER — SOCIAL WORK (OUTPATIENT)
Dept: IMMUNOLOGY | Facility: CLINIC | Age: 43
End: 2023-12-19
Payer: COMMERCIAL

## 2023-12-19 NOTE — PROGRESS NOTES
[facility]    Mental Health Counseling Session Update - Protected Health Information        Date:   12-19-23   Virtual telehealth session with Ms. Chapa.           Total  Time:   60 min                             Progress:  Good  / Fair                 Compliant with Psych. Meds:  Yes      Therapeutic Modality:  Supportive    DBT             Factors regarding Medical treatment:   N/A          Changes (if any) to Initial Assessment:                      Treatment Modality:  Individual    Suicidal/ Homicidal Concerns: No    Factors aiding or impeding psychotherapy progress:   came     Processed client's thoughts, feelings, and actions.  Focused on distress tolerance, interpersonal effectiveness, and emotion regulation.  Explored ideas, choices, and problem-solving.  Talked about planning and utilizing smaller tasks/ time frames.            Next Session:   12-27-23    Kera Landry [facility]  Mental Health Counselor

## 2023-12-27 ENCOUNTER — TELEPHONE (OUTPATIENT)
Dept: IMMUNOLOGY | Facility: CLINIC | Age: 43
End: 2023-12-27
Payer: COMMERCIAL

## 2023-12-27 NOTE — TELEPHONE ENCOUNTER
12-27-23    Client did not log into the virtual telehealth platform link for today's scheduled telehealth session.  Two phone attempts made, voice messages left for client.    Kera Landry, [facility]  Mental Health Counselor

## 2023-12-28 ENCOUNTER — TELEPHONE (OUTPATIENT)
Dept: IMMUNOLOGY | Facility: CLINIC | Age: 43
End: 2023-12-28
Payer: COMMERCIAL

## 2023-12-28 NOTE — TELEPHONE ENCOUNTER
12-28-23    Voice message received from Ms. Chapa. Returned her call and spoke with client. She rescheduled her session for 1-3-24.    Kera Landry, [facility]  Mental Health Counselor

## 2024-01-02 ENCOUNTER — SOCIAL WORK (OUTPATIENT)
Dept: IMMUNOLOGY | Facility: CLINIC | Age: 44
End: 2024-01-02
Payer: COMMERCIAL

## 2024-01-02 NOTE — PROGRESS NOTES
[facility]    Mental Health Counseling Session Update - Protected Health Information        Date:  1-2-24     Virtual telehealth session with Ms. Smith.       Total  Time:   60 min                            Progress:  Fair  /Good                   Compliant with Psych. Meds:  Yes      Therapeutic Modality:  Supportive    DBT            Factors regarding Medical treatment:   N/A          Changes (if any) to Initial Assessment:                      Treatment Modality:  Individual    Suicidal/ Homicidal Concerns:  No    Factors aiding or impeding psychotherapy progress:   came     Processed client's thoughts, feelings, and actions.  Focused on distress tolerance, interpersonal effectiveness, and emotion regulation.  Encouraged utilizing mindfulness and grounding techniques.  Reflected on client's progress.        Next Session:   1-9-24    Kera Landry [facility]  Mental Health Counselor

## 2024-01-09 ENCOUNTER — SOCIAL WORK (OUTPATIENT)
Dept: IMMUNOLOGY | Facility: CLINIC | Age: 44
End: 2024-01-09
Payer: COMMERCIAL

## 2024-01-09 NOTE — PROGRESS NOTES
[facility]    Mental Health Counseling Session Update - Protected Health Information        Date:  1-9-24    Virtual telehealth session with  Ms. Chapa.       Total  Time:   60 min                            Progress:   Fair   /Good              Compliant with Psych. Meds:  Yes       Therapeutic Modality:  Supportive   DBT             Factors regarding Medical treatment:   Multiple medical/ physical concerns/ limitations.            Changes (if any) to Initial Assessment:                      Treatment Modality:  Individual    Suicidal/ Homicidal Concerns:   No    Factors aiding or impeding psychotherapy progress:   came     Processed client's thoughts, feelings, and actions.  Focused on distress tolerance, interpersonal effectiveness, and emotion regulation.  Explored ideas, choices, resources, and problem-solving.  Examined thought, behavioral, emotional, and relationship patterns.   Encouraged utilizing mindfulness and grounding techniques.           Next Session:    1-15-24      Kera Landry [facility]  Mental Health Counselor

## 2024-01-15 ENCOUNTER — TELEPHONE (OUTPATIENT)
Dept: IMMUNOLOGY | Facility: CLINIC | Age: 44
End: 2024-01-15
Payer: COMMERCIAL

## 2024-01-15 ENCOUNTER — APPOINTMENT (OUTPATIENT)
Dept: IMMUNOLOGY | Facility: CLINIC | Age: 44
End: 2024-01-15
Payer: COMMERCIAL

## 2024-01-15 DIAGNOSIS — E78.5 HYPERLIPIDEMIA, UNSPECIFIED HYPERLIPIDEMIA TYPE: ICD-10-CM

## 2024-01-15 DIAGNOSIS — E66.9 OBESITY (BMI 30-39.9): ICD-10-CM

## 2024-01-15 DIAGNOSIS — E13.69 OTHER SPECIFIED DIABETES MELLITUS WITH OTHER SPECIFIED COMPLICATION, WITHOUT LONG-TERM CURRENT USE OF INSULIN (MULTI): ICD-10-CM

## 2024-01-15 DIAGNOSIS — B20 HUMAN IMMUNODEFICIENCY VIRUS (HIV) DISEASE (MULTI): ICD-10-CM

## 2024-01-15 DIAGNOSIS — Z79.899 HIGH RISK MEDICATION USE: ICD-10-CM

## 2024-01-15 DIAGNOSIS — B20 HIV DISEASE (MULTI): ICD-10-CM

## 2024-01-15 DIAGNOSIS — Z11.3 ROUTINE SCREENING FOR STI (SEXUALLY TRANSMITTED INFECTION): ICD-10-CM

## 2024-01-15 NOTE — TELEPHONE ENCOUNTER
1-15-24    Ms. Chapa did not log into the virtual telehealth platform link for today's scheduled telehealth session.  Two phone attempts made, voice messages left for client.    Kera Landry, [facility]  Mental Health Counselor

## 2024-01-16 ENCOUNTER — TELEPHONE (OUTPATIENT)
Dept: IMMUNOLOGY | Facility: CLINIC | Age: 44
End: 2024-01-16
Payer: COMMERCIAL

## 2024-01-16 NOTE — TELEPHONE ENCOUNTER
1-16-24    Voice mail received from Ms. Chapa.  Returned her call and she rescheduled her counseling session for 1-23-24.    Kera Landry, [facility]  Mental Health Counselor

## 2024-01-19 ENCOUNTER — TELEMEDICINE (OUTPATIENT)
Dept: IMMUNOLOGY | Facility: CLINIC | Age: 44
End: 2024-01-19
Payer: COMMERCIAL

## 2024-01-19 DIAGNOSIS — R33.9 URINARY RETENTION: ICD-10-CM

## 2024-01-19 DIAGNOSIS — E78.5 HYPERLIPIDEMIA, UNSPECIFIED HYPERLIPIDEMIA TYPE: ICD-10-CM

## 2024-01-19 DIAGNOSIS — M54.9 CHRONIC BACK PAIN, UNSPECIFIED BACK LOCATION, UNSPECIFIED BACK PAIN LATERALITY: ICD-10-CM

## 2024-01-19 DIAGNOSIS — G89.29 CHRONIC BACK PAIN, UNSPECIFIED BACK LOCATION, UNSPECIFIED BACK PAIN LATERALITY: ICD-10-CM

## 2024-01-19 DIAGNOSIS — G47.00 INSOMNIA, UNSPECIFIED TYPE: ICD-10-CM

## 2024-01-19 DIAGNOSIS — E66.9 OBESITY, UNSPECIFIED CLASSIFICATION, UNSPECIFIED OBESITY TYPE, UNSPECIFIED WHETHER SERIOUS COMORBIDITY PRESENT: ICD-10-CM

## 2024-01-19 DIAGNOSIS — B20 HIV INFECTION, UNSPECIFIED SYMPTOM STATUS (MULTI): Primary | ICD-10-CM

## 2024-01-19 PROCEDURE — 99214 OFFICE O/P EST MOD 30 MIN: CPT | Performed by: INTERNAL MEDICINE

## 2024-01-19 RX ORDER — CYCLOBENZAPRINE HCL 5 MG
5 TABLET ORAL 3 TIMES DAILY PRN
Qty: 30 TABLET | Refills: 2 | Status: SHIPPED | OUTPATIENT
Start: 2024-01-19

## 2024-01-19 RX ORDER — IBUPROFEN 600 MG/1
600 TABLET ORAL
Qty: 90 TABLET | Refills: 2 | Status: SHIPPED | OUTPATIENT
Start: 2024-01-19

## 2024-01-19 NOTE — ASSESSMENT & PLAN NOTE
Lipids with next labs, will continue to reassess ASCVD and whether to prescribe statin (current 10 yr risk 1.9%).

## 2024-01-19 NOTE — ASSESSMENT & PLAN NOTE
Well controlled clinically, last labs reviewed with patient, viral load undetectable, toxicity labs reviewed, no concerns.  Continue Biktarvy.  Due for labs, which are ordered.

## 2024-01-19 NOTE — PROGRESS NOTES
Subjective   Patient ID: Cherrie Chapa is a 43 y.o. female with HIV on Biktarvy who presents for HIV follow up visit. Patient was evaluated via telephone visit today.    From an HIV standpoint, doing well, no missed doses.    Follows with mental health provider regularly for anxiety, bipolar disorder, PTSD. Tolerating her meds. Does report issues with trouble sleeping recently.     Continuing to work on lifestyle changes for weight loss- she feels that dietary modifications are going better than exercise, the latter she is not very consistent with. She feels her weight is stable, possibly slightly lower.    Asthma is well controlled, sometimes flares up with the weather changes, uses albuterol as needed.    She does report some urinary retention, feels she cannot empty her bladder completely. This has been coming on slowly over several months, but is recently worse. No pain with urination, hematuria, etc.    ROS: All systems checked and are negative except for what is noted in HPI    Objective     Visit Vitals  Smoking Status Former     .  Lab Results   Component Value Date    MYO1RRXRA NOT DETECTED 09/28/2023      .  Lab Results   Component Value Date    WBC 6.8 09/28/2023    HGB 13.6 09/28/2023    HCT 41.0 09/28/2023     09/28/2023    CHOL 272 (H) 09/28/2023    TRIG 441 (H) 09/28/2023    HDL 36.9 (A) 09/28/2023    ALT 33 09/28/2023    AST 21 09/28/2023     09/28/2023    K 3.8 09/28/2023     09/28/2023    CREATININE 0.85 09/28/2023    BUN 12 09/28/2023    CO2 27 09/28/2023    TSH 1.88 01/25/2021    INR 1.0 04/01/2021    HGBA1C 5.1 03/22/2023         Assessment/Plan   Cherrie Chapa is a 43 y.o. female with HIV on Biktarvy who presents for HIV follow up visit. Patient was evaluated via telephone visit today.    Problem List Items Addressed This Visit       HIV infection (CMS/[facility]) - Primary    Current Assessment & Plan     Well controlled clinically, last labs reviewed with patient, viral load  undetectable, toxicity labs reviewed, no concerns.  Continue Biktarvy.  Due for labs, which are ordered.         Hyperlipidemia    Current Assessment & Plan     Lipids with next labs, will continue to reassess ASCVD and whether to prescribe statin (current 10 yr risk 1.9%).         Insomnia    Current Assessment & Plan     Follows with behavioral health, has as needed meds prescribed.         Obesity    Current Assessment & Plan     Continue to encourage dietary modifications and exercise.  10 year ASCVD risk 1.9%, will hold on statin at this time, but will readdress with updated labs/BP/etc at next visit.         Urinary retention    Current Assessment & Plan     Reports of urinary retention recently, feels like she can't empty her bladder.  Could be medication related, as she takes hydroxyzine as needed frequently.  Is going to see OBGYN regarding this.           Depression/anxiety - continue to follow up regularly with behavioral health providers, controlled on meds currently.    Discussed with Dr. Yu.  Will see back in 6 months.    Guera Spear MD    Infectious Disease Fellow

## 2024-01-19 NOTE — ASSESSMENT & PLAN NOTE
Continue to encourage dietary modifications and exercise.  10 year ASCVD risk 1.9%, will hold on statin at this time, but will readdress with updated labs/BP/etc at next visit.

## 2024-01-19 NOTE — ASSESSMENT & PLAN NOTE
Reports of urinary retention recently, feels like she can't empty her bladder.  Could be medication related, as she takes hydroxyzine as needed frequently.  Is going to see OBGYN regarding this.

## 2024-01-23 ENCOUNTER — SOCIAL WORK (OUTPATIENT)
Dept: IMMUNOLOGY | Facility: CLINIC | Age: 44
End: 2024-01-23
Payer: COMMERCIAL

## 2024-01-26 ENCOUNTER — SOCIAL WORK (OUTPATIENT)
Dept: IMMUNOLOGY | Facility: CLINIC | Age: 44
End: 2024-01-26
Payer: COMMERCIAL

## 2024-01-26 ENCOUNTER — TELEPHONE (OUTPATIENT)
Dept: IMMUNOLOGY | Facility: CLINIC | Age: 44
End: 2024-01-26
Payer: COMMERCIAL

## 2024-01-26 NOTE — TELEPHONE ENCOUNTER
SW received a VM from pt. SW called back.  Per pt, she is going through some housing issues and is working with . Pt said that she pays her rent to court. She inquired SW if she could get compensation for the emotional distress she is going through. Pt reported issues with her landlord and frustration when it comes to working with landlord. SW encouraged pt to discuss this question with her  at  as SW does not do housing or is aware of any program like this.  Pt mentioned that she wants to move. SW encouraged pt to connect with Nathanael Morales or [facility] for assistance.

## 2024-01-26 NOTE — PROGRESS NOTES
[facility]    Mental Health Counseling Session Update - Protected Health Information        Date: 1-26-24         Virtual telehealth session with Ms. Chapa.          Total  Time:   60 min                             Progress:  Good      / Fair                 Compliant with Psych. Meds:  Yes       Therapeutic Modality:  Supportive    DBT             Factors regarding Medical treatment:  Multiple medical/ physical concerns/ limitations.                Changes (if any) to Initial Assessment:                      Treatment Modality:  Individual    Suicidal/ Homicidal Concerns:  No    Factors aiding or impeding psychotherapy progress:   came     Processed client's thoughts, feelings, and actions.  Focused on distress tolerance and interpersonal effectiveness.  Examined thought, behavioral, emotional, and relationship patterns.  Discussed triggers and long term affects of trauma.  Talked through relationship effectiveness and self-respect effectiveness.   Reflected on client's life experiences and progress.   Provided support and empathetic understanding.          Next Session:   1-30-24    Kera Landry [facility]  Mental Health Counselor

## 2024-01-29 ENCOUNTER — TELEMEDICINE (OUTPATIENT)
Dept: BEHAVIORAL HEALTH | Facility: CLINIC | Age: 44
End: 2024-01-29
Payer: COMMERCIAL

## 2024-01-29 DIAGNOSIS — F31.81 BIPOLAR 2 DISORDER (MULTI): Primary | ICD-10-CM

## 2024-01-29 DIAGNOSIS — F51.04 PSYCHOPHYSIOLOGICAL INSOMNIA: ICD-10-CM

## 2024-01-29 DIAGNOSIS — F42.8 OBSESSIVE THINKING: ICD-10-CM

## 2024-01-29 DIAGNOSIS — R45.4 IRRITABILITY AND ANGER: ICD-10-CM

## 2024-01-29 DIAGNOSIS — F41.9 ANXIETY: ICD-10-CM

## 2024-01-29 DIAGNOSIS — F43.10 PTSD (POST-TRAUMATIC STRESS DISORDER): ICD-10-CM

## 2024-01-29 PROCEDURE — 99214 OFFICE O/P EST MOD 30 MIN: CPT | Performed by: NURSE PRACTITIONER

## 2024-01-29 ASSESSMENT — PATIENT HEALTH QUESTIONNAIRE - PHQ9
6. FEELING BAD ABOUT YOURSELF - OR THAT YOU ARE A FAILURE OR HAVE LET YOURSELF OR YOUR FAMILY DOWN: SEVERAL DAYS
3. TROUBLE FALLING OR STAYING ASLEEP OR SLEEPING TOO MUCH: MORE THAN HALF THE DAYS
7. TROUBLE CONCENTRATING ON THINGS, SUCH AS READING THE NEWSPAPER OR WATCHING TELEVISION: SEVERAL DAYS
8. MOVING OR SPEAKING SO SLOWLY THAT OTHER PEOPLE COULD HAVE NOTICED. OR THE OPPOSITE, BEING SO FIGETY OR RESTLESS THAT YOU HAVE BEEN MOVING AROUND A LOT MORE THAN USUAL: SEVERAL DAYS
2. FEELING DOWN, DEPRESSED OR HOPELESS: MORE THAN HALF THE DAYS
1. LITTLE INTEREST OR PLEASURE IN DOING THINGS: MORE THAN HALF THE DAYS
5. POOR APPETITE OR OVEREATING: MORE THAN HALF THE DAYS
9. THOUGHTS THAT YOU WOULD BE BETTER OFF DEAD, OR OF HURTING YOURSELF: NOT AT ALL
10. IF YOU CHECKED OFF ANY PROBLEMS, HOW DIFFICULT HAVE THESE PROBLEMS MADE IT FOR YOU TO DO YOUR WORK, TAKE CARE OF THINGS AT HOME, OR GET ALONG WITH OTHER PEOPLE: VERY DIFFICULT
4. FEELING TIRED OR HAVING LITTLE ENERGY: MORE THAN HALF THE DAYS

## 2024-01-29 ASSESSMENT — ENCOUNTER SYMPTOMS
RESPIRATORY NEGATIVE: 1
MUSCULOSKELETAL NEGATIVE: 1
GASTROINTESTINAL NEGATIVE: 1
HEMATOLOGIC/LYMPHATIC NEGATIVE: 1

## 2024-01-29 ASSESSMENT — ANXIETY QUESTIONNAIRES
6. BECOMING EASILY ANNOYED OR IRRITABLE: MORE THAN HALF THE DAYS
4. TROUBLE RELAXING: SEVERAL DAYS
IF YOU CHECKED OFF ANY PROBLEMS ON THIS QUESTIONNAIRE, HOW DIFFICULT HAVE THESE PROBLEMS MADE IT FOR YOU TO DO YOUR WORK, TAKE CARE OF THINGS AT HOME, OR GET ALONG WITH OTHER PEOPLE: VERY DIFFICULT
1. FEELING NERVOUS, ANXIOUS, OR ON EDGE: MORE THAN HALF THE DAYS
2. NOT BEING ABLE TO STOP OR CONTROL WORRYING: NEARLY EVERY DAY
GAD7 TOTAL SCORE: 13
3. WORRYING TOO MUCH ABOUT DIFFERENT THINGS: MORE THAN HALF THE DAYS
5. BEING SO RESTLESS THAT IT IS HARD TO SIT STILL: SEVERAL DAYS
7. FEELING AFRAID AS IF SOMETHING AWFUL MIGHT HAPPEN: MORE THAN HALF THE DAYS

## 2024-01-29 NOTE — PROGRESS NOTES
"Adult Ambulatory Psychiatry Progress Note      Assessment/Plan     Impression:  Cherrie Chapa is a 43 y.o. female domiciled  with 2 children, on disability, who presents for follow up with CC of \"I'm trying to stay on top of getting things organized and getting out of my bedroom more. I have had some down days, the past couple of weeks, but it's been ok. The housing thing is still ongoing, but I am just going to ride it out, as she (manager) got the accommodations paperwork for the cat. Even the  found the situation amusing, and will stay on top of this until it is resolved, and I want the paperwork turned over as she keeps not turning it in to me. It can leave me depressed and down, but I talk about it with Megan and I work on some DBT worksheets with her, and that helps me work out things.\"     Plan: Reviewed and agreed to leaving medications and treatment plan in place as patient is aware of her triggers and medications continue to help with her underlying symptoms while she does therapy. Indicated stopping THC use so will continue to use lessons/skills learned in therapy to cope with triggers for anxiety. Continues to see her therapist, Ms. Landry weekly if not bi-weekly.    Medication: Rexulti 4mg at bedtime, Zoloft 150mg every day. Discontinuing Diazepam altogether due to being on prescribed medical THC, Atarax 25-50mg as needed for anxiety spikes and for additional sleep aid.    Reviewed r/b/a, possible side effects of the medication. Client is aware about the benefit outweighs the risk.     Diagnoses and all orders for this visit:  Bipolar 2 disorder (CMS/[facility])  -     brexpiprazole 4 mg tablet; Take 4 mg by mouth once daily.  Anxiety  Irritability and anger  Obsessive thinking  PTSD (post-traumatic stress disorder)  Psychophysiological insomnia      Therapy: none    Other: n/a    Patient is reminded that if there is SI to call [phone] and get themselves to the closest ED for evaluation, " "otherwise contact me for other questions/concerns.     Subjective   HPI:  \"Both the patient, and family and caregivers and guardians as appropriate, were informed of the current need to conduct treatment via telephone. I have confirmed the patient's identity via the following (minimum of three) acceptable identifiers as per  Policy PH-9: , [place] home address.\"     Present Illness - anxiety, irritability and anger, depression     Characteristics/Recent psychiatric symptoms (pertinent positives and negatives) - reports noticing more of the depressive episodes and some anxiety, d/t the circumstances about the arrangement with her living - the  is stretching our responding to requests for documents (accommodations for cat), with hopes of the patient leaving, or causing the patient to become so frustrated with her, that she would blow up and result in her getting kicked out of the apartment. Reports this process can bring her mood down, but the legal system, especially the young  and his , along with her own / has been so supportive and on top of her situation, that they are making sure to follow her situation closely, and not let the  and her  take advantage of the situation, and upset the patient. Patient reports still seeing her therapist Megan, every week, twice a week (psychotherapy and then the other day is going over the DBT worksheets and reviewing the results). Reports her anxiety does trigger the anger and irritability, and then it causes her depression to flare up, 'but honestly I am just feeling more of the downs lately.' Denies eric. Reports her son is doing well with being in high school, and being a teenager, and her daughter is wrapping up her medical billing/coding school this summer and does her side job with home health aide work and is learning how the coding is associated with the work done when the RN bills for the " "work done. Reports her sleep is 'about the same. I am now going to bed later, but I do wake up early still, then go back to laying down.' Reports sleep is only averaging 4-5 hours a night. Reports will still nap during the day averaging 3 hours and \"I am aware that I am in a rut and need to not do that.\" Reports with her depression it can leave her leaving more with less energy, but 'I do make sure to keep herself busy. I have to keep the apartment organized. Tho I may get a treadmill as I had thought of going to the gym but I do not want to be around other people.' Feels Kimberly continues to help manage her overall mental help, even with depression or irritability which stems from the situations she may find herself in. Reports will take the Atarax at night to help her sleep at night, for sleep but not as consistently, and revealed she has stopped taking the THC altogether, about 3 weeks ago and feels by doing so, her sleep has now become more problematic and 'I know it helped me with some things, but I know that I just want to be more present without it.' Appetite has been 'not that good, because I am sleeping more than I should during the day. Like on the weekend I will oversleep till 3-4pm in the afternoon, I will have maybe one meal, and most days maybe 1-2 meals a day.' Reports noticing some racing, and obsessive thoughts, when the anxiety was getting impacted by the legal situation, 'but they've been not too bad. I will play a game on my IPAD or change channels to distract myself. Or I will sleep to avoid things, but I know that is not good and I and Megan have talked about that.'      Onset/timeframe - 1 month  Type - anxiety, depression   Duration - daily  Aggravating and/or relieving factors/triggers - feels medications are continuing to help with her mood, but knows the outside triggers in her life ( specifically who is dragging the process out on purpose) and is on the verge of " trying to antagonize the patient to react emotionally, and patient is using therapy and skills to remain calm and stay in the moment and not react.  Related symptoms  Treatment and treatment changes (new meds, dosage increases or decreases, med compliance, therapy frequency, etc.) (Past and Recent) - She sees Megan Landry, her therapist twice a week with [facility] - CBT and psychodynamic therapy. Rexulti 4mg @[facility], Zoloft 150mg QD, Diazepam 5mg 1 tablet PRN daily (on hold), Atarax 25-50mg PRN. Topamax 75mg BID (managed by pain management doctor) for neck pain     Issues: Denies SI/HI/AVH.           Review of Systems   Respiratory: Negative.     Gastrointestinal: Negative.    Genitourinary: Negative.    Musculoskeletal: Negative.    Allergic/Immunologic: Positive for immunocompromised state.   Hematological: Negative.        OARRS:  Moody Peoples, YANELI-CNP on 1/29/2024  7:43 PM  I have personally reviewed the OARRS report for Cherrie Chapa. I have considered the risks of abuse, dependence, addiction and diversion    Is the patient prescribed a combination of a benzodiazepine and opioid?  No    Last Urine Drug Screen / ordered today: No  No results found for this or any previous visit (from the past 8760 hour(s)).  N/A    Clinical rationale for not completing a Urine Drug Screen: Valium was discontinued as patient is prescribed medical THC      Controlled Substance Agreement:  Date of the Last Agreement: N/A    Objective   Mental Status Exam:  General Appearance: Well groomed, appropriate eye contact  Attitude/Behavior: Cooperative, Distracted  Motor: Fidgeting  Speech: Rapid Speech, pressured  Gait/Station: Other:(comment) (sitting on living room couch with friend's granddaughter, over virtual connection)  Mood: 'alright, just anxious still'  Affect: Constricted, Flat, Anxious, Congruent with mood and topic of conversation  Thought Process: Linear, goal directed, Perseverative  Thought Associations:  No loosening of associations  Thought Content: Normal, Other: (comment) (trying to get her housing situation (including accomodations for her cat) settled and staying calm, while legal is on her side.)  Perception: No perceptual abnormalities noted  Sensorium: Alert and oriented to person, place, time and situation  Insight: Fair  Judgement: Fair  Cognition: Cognitively intact to conversational testing with respect to attention, orientation, fund of knowledge, recent and remote memory, and language  Testing: N/A    TIM-7/PHQ-9 scores reviewed: 13, 13 compared to 13, 20 reflecting no change with anxiety and some improvement with depression/mood.        Current Medications:  Current Outpatient Medications on File Prior to Visit   Medication Sig Dispense Refill    hydrOXYzine HCL (Atarax) 25 mg tablet Take 1 tablet (25 mg) by mouth 3 times a day as needed.      sertraline (Zoloft) 100 mg tablet Take 1 tablet (100 mg) by mouth once daily. 1 and .5 tablets daily 90 tablet 3    topiramate (Topamax) 25 mg tablet Take 1 tablet (25 mg) by mouth. Take per titration, fax to pharmacy      [DISCONTINUED] brexpiprazole 4 mg tablet Take 4 mg by mouth once daily. 60 tablet 0    acetaminophen (Tylenol) 325 mg tablet Take 2 tablets (650 mg) by mouth every 6 hours.      albuterol (Ventolin HFA) 90 mcg/actuation inhaler Inhale 1-2 puffs. Every 4-6 hours      Biktarvy -25 mg tablet take 1 tablet by mouth once daily 30 tablet 5    cyclobenzaprine (Flexeril) 5 mg tablet Take 1 tablet (5 mg) by mouth 3 times a day as needed for muscle spasms. 30 tablet 2    ergocalciferol (Vitamin D-2) 1.25 MG (81751 UT) capsule Take 1 capsule (1,250 mcg) by mouth 1 (one) time per week.      ibuprofen 600 mg tablet Take 1 tablet (600 mg) by mouth 3 times a day with meals. 90 tablet 2    lidocaine (Lidoderm) 5 % patch Place 1 patch on the skin every 12 hours. (Leave on for 12 hours, then remove and leave off for 12 hours)      mv-min-FA-vit  K-lutein-zeaxant (PreserVision AREDS 2 Plus MV) 200 mcg-15 mcg- 5 mg-1 mg capsule Take 1 capsule by mouth 2 times a day.      naproxen (Naprosyn) 500 mg tablet Take 1 tablet (500 mg) by mouth 2 times a day with meals.      polyethylene glycol (Glycolax, Miralax) 17 gram/dose powder Take 17 g by mouth. 17 gram/dose powder       No current facility-administered medications on file prior to visit.       Lab Review:   Orders Only on 09/28/2023   Component Date Value    HIV-1 RNA Viral Load 09/28/2023 NOT DETECTED     HIV-1 RNA PCR Viral Load* 09/28/2023 NOT CALCULATED     Vitamin D, 25-Hydroxy 09/28/2023 22 (A)     Cholesterol 09/28/2023 272 (H)     HDL 09/28/2023 36.9 (A)     Cholesterol/HDL Ratio 09/28/2023 7.4 (A)     LDL 09/28/2023 -     VLDL 09/28/2023 SEE COMMENT     Triglycerides 09/28/2023 441 (H)     Non HDL Cholesterol 09/28/2023 235     Neisseria gonorrhea,Ampl* 09/28/2023 NEGATIVE     Chlamydia trachomatis, A* 09/28/2023 NEGATIVE     Glucose 09/28/2023 106 (H)     Sodium 09/28/2023 141     Potassium 09/28/2023 3.8     Chloride 09/28/2023 105     Bicarbonate 09/28/2023 27     Anion Gap 09/28/2023 13     Urea Nitrogen 09/28/2023 12     Creatinine 09/28/2023 0.85     GFR Female 09/28/2023 86     Calcium 09/28/2023 9.8     Albumin 09/28/2023 4.1     Alkaline Phosphatase 09/28/2023 76     Total Protein 09/28/2023 6.6     AST 09/28/2023 21     Total Bilirubin 09/28/2023 0.5     ALT (SGPT) 09/28/2023 33     Trichomonas Vaginalis 09/28/2023 NEGATIVE     Site 09/28/2023 BLOOD     CD3+CD4+% 09/28/2023 43     CD3+CD4+ Absolute 09/28/2023 1.281     CD3+CD8+% 09/28/2023 39 (H)     CD3+CD8+ Absolute 09/28/2023 1.162     CD4/CD8 Ratio 09/28/2023 1.10     CD45% 09/28/2023 100     Method 09/28/2023 SEE BELOW     WBC 09/28/2023 6.8     nRBC 09/28/2023 0.0     RBC 09/28/2023 4.11     Hemoglobin 09/28/2023 13.6     Hematocrit 09/28/2023 41.0     MCV 09/28/2023 100     MCHC 09/28/2023 33.2     Platelets 09/28/2023 293     RDW  09/28/2023 11.8     Neutrophils % 09/28/2023 45.4     Immature Granulocytes %,* 09/28/2023 0.3     Lymphocytes % 09/28/2023 43.6     Monocytes % 09/28/2023 6.9     Eosinophils % 09/28/2023 3.2     Basophils % 09/28/2023 0.6     Neutrophils Absolute 09/28/2023 3.10     Lymphocytes Absolute 09/28/2023 2.98     Monocytes Absolute 09/28/2023 0.47     Eosinophils Absolute 09/28/2023 0.22     Basophils Absolute 09/28/2023 0.04          Time Spent:    Prep time: 1 min.  Direct patient time: 29 min.  Documentation time: 6 min.  Total time: 36 min.    Next Appointment:  Follow up in about 6 weeks (around 3/11/2024).

## 2024-01-30 ENCOUNTER — SOCIAL WORK (OUTPATIENT)
Dept: IMMUNOLOGY | Facility: CLINIC | Age: 44
End: 2024-01-30
Payer: COMMERCIAL

## 2024-01-30 DIAGNOSIS — M54.12 CERVICAL RADICULOPATHY: ICD-10-CM

## 2024-01-30 NOTE — PROGRESS NOTES
1-30-24    Ms. Chapa did not log into the virtual telehealth platform link for today's scheduled telehealth session.  Two phone attempts made, voice messages left for client.    Kera Landry, [facility]  Mental Health Counselor

## 2024-01-30 NOTE — PROGRESS NOTES
Per Dr. Koch order PT for neck pain in order for patient to be able to get a MRI she will need to complete 6 weeks of PT.

## 2024-02-06 ENCOUNTER — SOCIAL WORK (OUTPATIENT)
Dept: IMMUNOLOGY | Facility: CLINIC | Age: 44
End: 2024-02-06
Payer: COMMERCIAL

## 2024-02-06 NOTE — PROGRESS NOTES
[facility]    Mental Health Counseling Session Update - Protected Health Information        Date: 2-6-24         Virtual telehealth session with Ms. Chapa.          Total  Time:  60 min                            Progress:     Fair   / Good               Compliant with Psych. Meds:  Yes       Therapeutic Modality:  Supportive    DBT             Factors regarding Medical treatment:   Voices some medical/ physical concerns/ limitations.            Changes (if any) to Initial Assessment:                      Treatment Modality:  Individual    Suicidal/ Homicidal Concerns:  No    Factors aiding or impeding psychotherapy progress:   came     Processed client's thoughts, feelings, and actions.  Focused on distress tolerance and interpersonal effectiveness.  Examined thought, behavioral, emotional, and relationship patterns.  Explored ideas, choices, resources, and problem-solving.  Discussed triggers and long term affects of trauma.  Encouraged utilizing mindfulness and grounding techniques.           Next Session:   2-9-24  &   2-13-24        Kera Landry [facility]  Mental Health Counselor

## 2024-02-08 ENCOUNTER — APPOINTMENT (OUTPATIENT)
Dept: RADIOLOGY | Facility: CLINIC | Age: 44
End: 2024-02-08
Payer: COMMERCIAL

## 2024-02-09 ENCOUNTER — SOCIAL WORK (OUTPATIENT)
Dept: IMMUNOLOGY | Facility: CLINIC | Age: 44
End: 2024-02-09
Payer: COMMERCIAL

## 2024-02-09 NOTE — PROGRESS NOTES
[facility]    Mental Health Counseling Session Update - Protected Health Information        Date: 2-9-24          Virtual telehealth session with Ms. Chapa.           Total  Time:   60 min                             Progress:  Good       / Fair                 Compliant with Psych. Meds:  Yes      Therapeutic Modality:  Supportive    DBT             Factors regarding Medical treatment:   Multiple medical/ physical concerns/ limitations.                Changes (if any) to Initial Assessment:                      Treatment Modality:  Individual    Suicidal/ Homicidal Concerns:   No    Factors aiding or impeding psychotherapy progress:   came     Processed client's thoughts, feelings, and actions.  Focused on distress tolerance, interpersonal effectiveness, and emotion regulation.  Examined thought, behavioral, emotional, and relationship patterns.  Looked at perceptions, assumptions, expectations, and realistic outcomes.  Discussed triggers, provocation, and how hard it is to maintain composure.  Encouraged utilizing mindfulness, preparation, and self-care/ nuturance.  Provided support and empathetic understanding.           Next Session:   2-13-24    Kera Landry [facility]  Mental Health Counselor

## 2024-02-13 ENCOUNTER — SOCIAL WORK (OUTPATIENT)
Dept: IMMUNOLOGY | Facility: CLINIC | Age: 44
End: 2024-02-13
Payer: COMMERCIAL

## 2024-02-13 NOTE — PROGRESS NOTES
[facility]    Mental Health Counseling Session Update - Protected Health Information        Date: 2-13-24         Virtual telehealth session with Ms. Chapa.        Total  Time:   45 min                            Progress:   Fair     /  Good              Compliant with Psych. Meds:  Yes       Therapeutic Modality:  Supportive    DBT             Factors regarding Medical treatment:   Multiple medical/ physical concerns/ limitations.                Changes (if any) to Initial Assessment:                      Treatment Modality:  Individual    Suicidal/ Homicidal Concerns:   No    Factors aiding or impeding psychotherapy progress:   came     Processed client's thoughts, feelings, and actions.  Focused on distress tolerance, interpersonal effectiveness, and emotion regulation.  Explored ideas, choices, resources, and problem-solving.  Examined perceptions, assumptions, expectations, and realistic outcomes.  Looked at thought, behavioral, emotional, and relationship patterns.  Discussed triggers, automatic reactions, and long term affects of trauma.  Encouraged utilizing mindfulness and grounding techniques.  Provided support and empathetic understanding.   Reflected on recent situations and client's progress.      Next Session:    2-20-24      Kera Landry [facility]  Mental Health Counselor

## 2024-02-20 ENCOUNTER — SOCIAL WORK (OUTPATIENT)
Dept: IMMUNOLOGY | Facility: CLINIC | Age: 44
End: 2024-02-20
Payer: COMMERCIAL

## 2024-02-20 NOTE — PROGRESS NOTES
[facility]    Mental Health Counseling Session Update - Protected Health Information        Date: 2-20-24           Virtual telehealth session with Ms. Chapa.          Total  Time:  60 min                            Progress:  Good      / Fair                  Compliant with Psych. Meds:  Yes       Therapeutic Modality:  Supportive    DBT             Factors regarding Medical treatment:   Multiple medical/ physical concerns/ limitations.                Changes (if any) to Initial Assessment:                      Treatment Modality:  Individual    Suicidal/ Homicidal Concerns:  No    Factors aiding or impeding psychotherapy progress:   came     Processed client's thoughts, feelings, and actions.  Focused on distress tolerance, interpersonal effectiveness, and emotion regulation.  Examined thought, behavioral, emotional, and relationship patterns.  Looked at perceptions, assumptions, expectations, and realistic outcomes.  Discussed triggers, automatic reactions, and long term affects of trauma.  Talked about the difficulties in interrupting, changing these reactions/ patterns.  Encouraged utilizing mindfulness and other DBT techniques.  Reflected on client's life experiences and progress.          Next Session:   2-22-24  & 2-27-24    Kera Landry [facility]  Mental Health Counselor

## 2024-02-22 ENCOUNTER — SOCIAL WORK (OUTPATIENT)
Dept: IMMUNOLOGY | Facility: CLINIC | Age: 44
End: 2024-02-22
Payer: COMMERCIAL

## 2024-02-22 NOTE — PROGRESS NOTES
[facility]    Mental Health Counseling Session Update - Protected Health Information        Date:  2-22-24           Virtual telehealth session with Ms. Smith.           Total  Time:  60 min                             Progress:  Good       / Fair                 Compliant with Psych. Meds:  Yes       Therapeutic Modality:  Supportive  CBT  DBT    Dynamic         Factors regarding Medical treatment:   Multiple medical/ physical concerns/ limitations.                Changes (if any) to Initial Assessment:                      Treatment Modality:  Individual    Suicidal/ Homicidal Concerns:  No    Factors aiding or impeding psychotherapy progress:   came     Processed client's thoughts, feelings, and actions.  Focused on distress tolerance, interpersonal effectiveness, and emotion regulation.  Examined thought, behavioral, emotional, and relationship patterns.  Client shared personal and family history.  Explored ideas, choices, reasoning, acceptance, accountability, and decision-making.  Encouraged utilizing mindfulness, celebrating, and grounding techniques.  Reflected on client's life experiences and progress.  Provided support and empathetic understanding.           Next Session:   2-27-24      Kera Landry [facility]  Mental Health Counselor

## 2024-02-27 ENCOUNTER — TELEPHONE (OUTPATIENT)
Dept: IMMUNOLOGY | Facility: CLINIC | Age: 44
End: 2024-02-27
Payer: COMMERCIAL

## 2024-02-27 NOTE — TELEPHONE ENCOUNTER
2-27-24    Ms. Chapa called and cancelled today's scheduled session. Client rescheduled her counseling session for 2-29-24.    Kera Landry, [facility]  Mental Health Counselor

## 2024-02-29 ENCOUNTER — SOCIAL WORK (OUTPATIENT)
Dept: IMMUNOLOGY | Facility: CLINIC | Age: 44
End: 2024-02-29
Payer: COMMERCIAL

## 2024-02-29 NOTE — PROGRESS NOTES
[facility]    Mental Health Counseling Session Update - Protected Health Information        Date: 2-29-24        Virtual telehealth session with  Ms. Chapa.         Total  Time:  60 min                             Progress:  Good        /  Fair                Compliant with Psych. Meds:  Yes       Therapeutic Modality:  Supportive    DBT             Factors regarding Medical treatment:   Multiple medical/ physical concerns/ limitations.              Changes (if any) to Initial Assessment:                      Treatment Modality:  Individual    Suicidal/ Homicidal Concerns:  No    Factors aiding or impeding psychotherapy progress:   came     Processed client's thoughts, feelings, and actions.  Focused on distress tolerance, interpersonal effectiveness, and emotion regulation.  Examined thought, behavioral, emotional, and relationship patterns.  Looked at perceptions, assumptions, expectations, and realistic outcomes.  Discussed triggers and long term affects of trauma.  Client shared DBT tools that are helpful to her.  Reflected on client's life experiences and progress.  Provided support and empathetic understanding.           Next Session:   3-5-24      Kera Landry [facility]  Mental Health Counselor

## 2024-03-05 ENCOUNTER — SOCIAL WORK (OUTPATIENT)
Dept: IMMUNOLOGY | Facility: CLINIC | Age: 44
End: 2024-03-05
Payer: COMMERCIAL

## 2024-03-05 ENCOUNTER — APPOINTMENT (OUTPATIENT)
Dept: PHYSICAL THERAPY | Facility: CLINIC | Age: 44
End: 2024-03-05

## 2024-03-05 NOTE — PROGRESS NOTES
[facility]    Mental Health Counseling Session Update - Protected Health Information        Date: 3-5-24                Virtual telehealth session with Mr. Chapa.                   Total  Time: 60 min                            Progress:  Good       /  Fair                 Compliant with Psych. Meds:  Yes       Therapeutic Modality:  Supportive    DBT             Factors regarding Medical treatment:   Multiple medical/ physical concerns/ limitations.                Changes (if any) to Initial Assessment:                      Treatment Modality:  Individual    Suicidal/ Homicidal Concerns:  No    Factors aiding or impeding psychotherapy progress:   came     Processed client's thoughts, feelings, and actions.  Focused on distress tolerance, interpersonal effectiveness, and emotion regulation.  Explored ideas, choices, reasoning, self-awareness, and problem-solving.  Examined thought, behavioral, emotional, and relationship patterns.  Looked  at perceptions, assumptions, expectations, and realistic outcomes.  Talked about DBT tools that are helpful to her.  Reflected on client's life experiences and progress.  Provided support and empathetic understanding.            Next Session:  3-12-24    Kera Landry [facility]  Mental Health Counselor

## 2024-03-06 ENCOUNTER — EVALUATION (OUTPATIENT)
Dept: PHYSICAL THERAPY | Facility: CLINIC | Age: 44
End: 2024-03-06
Payer: COMMERCIAL

## 2024-03-06 DIAGNOSIS — M54.12 CERVICAL RADICULOPATHY: ICD-10-CM

## 2024-03-06 PROCEDURE — 97110 THERAPEUTIC EXERCISES: CPT | Mod: GP | Performed by: PHYSICAL THERAPIST

## 2024-03-06 PROCEDURE — 97161 PT EVAL LOW COMPLEX 20 MIN: CPT | Mod: GP | Performed by: PHYSICAL THERAPIST

## 2024-03-06 ASSESSMENT — PAIN - FUNCTIONAL ASSESSMENT: PAIN_FUNCTIONAL_ASSESSMENT: 0-10

## 2024-03-06 ASSESSMENT — PAIN SCALES - GENERAL: PAINLEVEL_OUTOF10: 0 - NO PAIN

## 2024-03-06 NOTE — PROGRESS NOTES
Physical Therapy Evaluation    Patient Name: Cherrie Chapa  MRN: [id number]  Today's Date: 3/6/2024  Time Calculation  Start Time: 1007  Stop Time: 1045  Time Calculation (min): 38 min    Visit #: 1  Visits approved: Auth required.  Certification dates: NA    Precautions:  Precautions  Precautions Comment: None. Low fall risk. No falls in last 6 months per pt.    Subjective/History    Other Measures  Neck Disability Index: 21/50    DOI: 2/1/23.  Mechanism of injury: Insidious onset.  Area of symptoms: Intermittent sharp pain at right neck that leads to N/T in lat arm, forearm and thumb. Pain Assessment: 0-10  Pain Score: 0 - No pain  Aggravating factors: Look over right shoulder. Look down.   Easing factors: Warm shower, cold pack, Naproxen.   Red flags: None.  Occupation: Disabled.  Recreation/Hobbies/Sports: Sedentary- would like to start walking for exer.   Living situation: Unremarkable.   Barriers to treatment: None.   Preferred language: English.    Objective Findings  Observation/gait: Mild forward head and shoulder posture. Mod excessive upper TS kyphosis.  AROM CS flex: wnl- right neck pain. Ext: wnl. Rot left: wnl. Right: wnl. SB left: wnl- pain. Right: wnl- pain.   Muscle activation/Resisted testing: UE motor: All wnl.  Special tests: ULTT self test: Neg.  Palpation: Tight bilat CS/TS paraspinals.   IV- PA: Local pain and jt stiffness RPA C5-T1. Stiff throughout mid CS/upper TS.     Treatment:   IV- bilat PA C5-T1 x 3 min// Flex: no change. Rot: no change.   Therex: Right UT stretch- HEP 30 sec, 4x/day. Scap pinch- HEP 10x, 4x/day.    Assessment  Patient presents with positional faults and local jt stiffness and pain consistent with C5-T2 dysfunction/postural syndrome. UE sxs consistent with radic despite neg ULTT.     Plan  Physical therapy 1-2 times/week for 6-8 wks. Therapy may include the following: Therapeutic exercise, manual therapy, education/home program.     Goals: Look down 5 min to read w/o  pain.  Look over right shoulder for driving w/o pain.

## 2024-03-11 ENCOUNTER — APPOINTMENT (OUTPATIENT)
Dept: BEHAVIORAL HEALTH | Facility: CLINIC | Age: 44
End: 2024-03-11
Payer: COMMERCIAL

## 2024-03-12 ENCOUNTER — SOCIAL WORK (OUTPATIENT)
Dept: IMMUNOLOGY | Facility: CLINIC | Age: 44
End: 2024-03-12
Payer: COMMERCIAL

## 2024-03-12 NOTE — PROGRESS NOTES
[facility]    Mental Health Counseling Session Update - Protected Health Information        Date: 3-12-24 Virtual telehealth session with Ms. Chapa.                  Total  Time:   60 min                            Progress:  Good      /  Fair               Compliant with Psych. Meds:  Yes      Therapeutic Modality:  Supportive    DBT               Factors regarding Medical treatment:   Multiple medical/ physical concerns/ limitations.                Changes (if any) to Initial Assessment:                      Treatment Modality:  Individual    Suicidal/ Homicidal Concerns:  No    Factors aiding or impeding psychotherapy progress:   came     Processed client's thoughts, feelings, and actions.  Focused on distress tolerance, interpersonal effectiveness, and emotion regulation.  Explored ideas, choices, and problem-solving.  Examined perceptions, assumptions, expectations, and realistic outcomes.  Talked about DBT tools that she's implementing daily.  Reflected on client's life experiences and progress.  Provided support and empathetic understanding.          Next Session:   3-22-24    Kera Landry [facility]  Mental Health Counselor

## 2024-03-13 ENCOUNTER — TREATMENT (OUTPATIENT)
Dept: PHYSICAL THERAPY | Facility: CLINIC | Age: 44
End: 2024-03-13
Payer: COMMERCIAL

## 2024-03-13 DIAGNOSIS — M54.12 CERVICAL RADICULOPATHY: ICD-10-CM

## 2024-03-13 PROCEDURE — 97110 THERAPEUTIC EXERCISES: CPT | Mod: GP | Performed by: PHYSICAL THERAPIST

## 2024-03-13 PROCEDURE — 97140 MANUAL THERAPY 1/> REGIONS: CPT | Mod: GP | Performed by: PHYSICAL THERAPIST

## 2024-03-13 NOTE — PROGRESS NOTES
Physical Therapy Follow-up    Patient Name: Cherrie Chapa  MRN: [id number]  Today's Date: 3/13/2024         Visit#: 2. 20 approved.  Cert dates: 3/6/24-6/7/24    Precautions: None.    Subjective: 6/10 resting neck pain today. Looking down and over shoulder ~ same. HEP going well.    Objective: AROM CS flex: wnl - min pain. Ext: wnl- pain. Rot: wnl bilat- pain each way.    Treatment:   Therapeutic exercise: Scap pinch, right UT stretch- demo'd correctly.  Supine chin nods- HEP 20x, 2x/day.   Tband row- HEP 20x, 2x/day.   Manual therapy: IV- bilat PA C5-T4// Flex and ext wnl- decr pain.     Assessment: Eugenie sxs with rx.    Plan: Incr postural exer.

## 2024-03-20 ENCOUNTER — APPOINTMENT (OUTPATIENT)
Dept: PHYSICAL THERAPY | Facility: CLINIC | Age: 44
End: 2024-03-20
Payer: COMMERCIAL

## 2024-03-21 ENCOUNTER — TELEMEDICINE (OUTPATIENT)
Dept: BEHAVIORAL HEALTH | Facility: CLINIC | Age: 44
End: 2024-03-21
Payer: COMMERCIAL

## 2024-03-21 DIAGNOSIS — F43.10 PTSD (POST-TRAUMATIC STRESS DISORDER): ICD-10-CM

## 2024-03-21 DIAGNOSIS — F43.21 FEELING GRIEF: ICD-10-CM

## 2024-03-21 DIAGNOSIS — F43.0 PANIC ATTACK AS REACTION TO STRESS: Primary | ICD-10-CM

## 2024-03-21 DIAGNOSIS — R45.4 IRRITABILITY AND ANGER: ICD-10-CM

## 2024-03-21 DIAGNOSIS — F41.9 ANXIETY: ICD-10-CM

## 2024-03-21 DIAGNOSIS — F51.04 PSYCHOPHYSIOLOGICAL INSOMNIA: ICD-10-CM

## 2024-03-21 DIAGNOSIS — F31.81 BIPOLAR 2 DISORDER (MULTI): ICD-10-CM

## 2024-03-21 DIAGNOSIS — F41.0 PANIC ATTACK AS REACTION TO STRESS: Primary | ICD-10-CM

## 2024-03-21 DIAGNOSIS — F42.8 OBSESSIVE THINKING: ICD-10-CM

## 2024-03-21 PROCEDURE — 99214 OFFICE O/P EST MOD 30 MIN: CPT | Performed by: NURSE PRACTITIONER

## 2024-03-21 PROCEDURE — 1036F TOBACCO NON-USER: CPT | Performed by: NURSE PRACTITIONER

## 2024-03-21 ASSESSMENT — ENCOUNTER SYMPTOMS
MUSCULOSKELETAL NEGATIVE: 1
RESPIRATORY NEGATIVE: 1
GASTROINTESTINAL NEGATIVE: 1
HEMATOLOGIC/LYMPHATIC NEGATIVE: 1

## 2024-03-21 NOTE — PROGRESS NOTES
"Adult Ambulatory Psychiatry Progress Note      Assessment/Plan     Impression:  Cherrie Chapa is a 44 y.o. female domiciled  with 2 children, on disability, who presents for follow up with CC of \"I'm doing ok. I am sticking with my therapy appts. I think we are going to change up my therapy appts so like 1 week 1 session, and the next week 2 sessions and even come in. I just need to get my car fixed and have new tags, and then I will be able to come in and see Ms. Guzman. I am doing ok, but I am feeling just a little down today.\"    Plan: Reviewed and agreed that while patient is stabilizing on current medications and with therapy, she can still have panic attacks when stressed. Patient has been off of all forms of THC for the past 2 months and agreed to restarting Xanax as needed. Ordered UTOX for the Valium and patient will be coming in person at next appointment to sign CSA. No other changes to medications or treatment plan. Continues to see her therapist, Ms. Landry weekly if not bi-weekly.    Medication: Rexulti 4mg at bedtime, Zoloft 150mg every day. Atarax 25-50mg as needed for anxiety spikes and for additional sleep aid. Can take Valium 5mg as needed for panic attacks in response to severe stress (14 tablets/30 days only with 1 refill).    Reviewed r/b/a, possible side effects of the medication. Client is aware about the benefit outweighs the risk.     Diagnoses and all orders for this visit:  Panic attack as reaction to stress  -     Drug Screen, Urine With Reflex to Confirmation; Future  -     diazePAM (Valium) 5 mg tablet; Take 1 tablet (5 mg) by mouth if needed for anxiety for up to 14 days.  Anxiety  Bipolar 2 disorder (CMS/HCC)  Feeling grief  Irritability and anger  PTSD (post-traumatic stress disorder)  Obsessive thinking  Psychophysiological insomnia      Therapy: none    Other: n/a    Patient is reminded that if there is SI to call [phone] and get themselves to the closest ED for " "evaluation, otherwise contact me for other questions/concerns.     Subjective   HPI:  \"Both the patient, and family and caregivers and guardians as appropriate, were informed of the current need to conduct treatment via telephone. I have confirmed the patient's identity via the following (minimum of three) acceptable identifiers as per  Policy PH-9: , [place]., home address.\"     Present Illness - anxiety, irritability and anger     Characteristics/Recent psychiatric symptoms (pertinent positives and negatives) - admits to keeping herself busy and feeling good, but had sometimes pushed herself too hard and 'I left myself overwhelmed  with things, and then I just have to let myself be ok with slowing down, and sit down and relax, after me cleaning and cleaning my apartment.' Reports she is making effort to get out of her apartment but 'my social anxiety is kicking my butt so I am staying home.' Reports her son is dealing with his own ADHD and anxiety (grades are strong but bad test anxiety and has been throwing up at school 3x a week), and her daughter moved out to live with her bf and 'it was time for change and I am happy for her.' Reports the  continues to drag out her re-verification her housing as that was to be done in 2023 and no later than 30 days and it was 60 days later. The manager is trying to double dip the rent from the patient and the patient is keeping her rent money in escrow to be paid out monthly and all of the paperwork is being handled through  and the  are all aware of the 'shenanigans'. Reports she knows that the manager is so passive-aggressive and trying to provoke the patient to respond negatively with owing her back rent (again in escrow) and 'I told  that I am no longer going to interact with her. The hearings are there for a reason and told her to come to the hearings but she never shows up. I used to work for [employer] and I know how this " "works. I told my daughter to not talk to her anymore.' Reports the manager is restarting the numbers game again and the patient is letting the courts manage it again. Reports waiting for disability to clear her again, and food stamps was closed and had to go back to court to get that reinstated. Reports no eric. Denies overall depression. Feels her anxiety can still trigger the anger and irritability, but through therapy, specifically DBT, she is able to let things go and let legal system handle the system. Reports the BIN letter is in place for her cat and her cat keeps her company. Reports other stressors in her life are her parents needing her help (can't be out in public when shopping for more than 1.5 hours), having so many people in her and her son's lives gone (death, jailed). Reports being happy about how she is handling herself - \"I am being present and working on staying calm and not reactive.\" Reports sleep is 'sometimes me getting more than 6 hours at night,' but also makes effort to get out of her bedroom and 'for me to stay out of there and in the living room is a big thing for me. It is a big step for me.' Reports making effort to not lay in her bed and nap. Appetite does 'vary. I am doing some intermittent fasting. I am trying to watch what I eat, especially less at night.' Reports noticing some racing, and obsessive thoughts, when the anxiety was getting impacted by the legal situation, but it is getting better. Stopped the medical THC and hopes to resume the PRN Xanax for when her anxiety does flareup.     Onset/timeframe - 1 month  Type - anxiety, irritability and anger  Duration - situational  Aggravating and/or relieving factors/triggers - feels medications are continuing to help with her mood, and doing DBT with her therapist is working on stepping outside of her comfort zone (bedroom, apartment) and keeping her temper in check which is helping her mood. Still has panic attacks when out in " public or in crowds for too long however.  Related symptoms  Treatment and treatment changes (new meds, dosage increases or decreases, med compliance, therapy frequency, etc.) (Past and Recent) - She sees Megan Landry, her therapist twice a week [facility] - DCBT and psychodynamic therapy. Rexulti 4mg @[facility], Zoloft 150mg QD, Diazepam 5mg 1 tablet PRN daily (on hold), Atarax 25-50mg PRN. Topamax 75mg BID (managed by pain management doctor) for neck pain     Issues: Denies SI/HI/AVH.           Review of Systems   Respiratory: Negative.     Gastrointestinal: Negative.    Genitourinary: Negative.    Musculoskeletal: Negative.    Allergic/Immunologic: Positive for immunocompromised state.   Hematological: Negative.        OARRS:  YANELI Orantes-MIGUEL on 3/23/2024  5:41 PM  I have personally reviewed the OARRS report for Cherrie Chapa. I have considered the risks of abuse, dependence, addiction and diversion    Is the patient prescribed a combination of a benzodiazepine and opioid?  No    Last Urine Drug Screen / ordered today: Yes  No results found for this or any previous visit (from the past 8760 hour(s)).  N/A    Clinical rationale for not completing a Urine Drug Screen: Restarting orders for drug screen put in place.      Controlled Substance Agreement:  Date of the Last Agreement: 03/23/2024    Objective   Mental Status Exam:  General Appearance: Well groomed, appropriate eye contact  Attitude/Behavior: Cooperative  Motor: Fidgeting  Speech: Rapid Speech, pressured  Gait/Station: Other:(comment) (sitting on bed over virtual connection)  Mood: 'ok, just frustrated'  Affect: Euthymic, full-range, Anxious, Congruent with mood and topic of conversation  Thought Process: Linear, goal directed, Perseverative  Thought Associations: No loosening of associations  Thought Content: Normal, Other: (comment) (feels mood is better with medications and therapy but ongoing issues with   trying to wrangle money out of patient who knows the intricacies of section 8 housing/social work and working with legal systems is frustrating her still.)  Perception: No perceptual abnormalities noted  Sensorium: Alert and oriented to person, place, time and situation  Insight: Intact  Judgement: Intact  Cognition: Cognitively intact to conversational testing with respect to attention, orientation, fund of knowledge, recent and remote memory, and language  Testing: N/A    TIM-7/PHQ-9 scores reviewed: 13, 13 compared to 13, 20 reflecting no change with anxiety and some improvement with depression/mood.        Current Medications:  Current Outpatient Medications on File Prior to Visit   Medication Sig Dispense Refill    brexpiprazole 4 mg tablet Take 4 mg by mouth once daily. 60 tablet 11    hydrOXYzine HCL (Atarax) 25 mg tablet Take 1 tablet (25 mg) by mouth 3 times a day as needed.      sertraline (Zoloft) 100 mg tablet Take 1 tablet (100 mg) by mouth once daily. 1 and .5 tablets daily 90 tablet 3    acetaminophen (Tylenol) 325 mg tablet Take 2 tablets (650 mg) by mouth every 6 hours.      albuterol (Ventolin HFA) 90 mcg/actuation inhaler Inhale 1-2 puffs. Every 4-6 hours      Biktarvy -25 mg tablet take 1 tablet by mouth once daily 30 tablet 5    cyclobenzaprine (Flexeril) 5 mg tablet Take 1 tablet (5 mg) by mouth 3 times a day as needed for muscle spasms. 30 tablet 2    ergocalciferol (Vitamin D-2) 1.25 MG (71436 UT) capsule Take 1 capsule (1,250 mcg) by mouth 1 (one) time per week.      ibuprofen 600 mg tablet Take 1 tablet (600 mg) by mouth 3 times a day with meals. 90 tablet 2    lidocaine (Lidoderm) 5 % patch Place 1 patch on the skin every 12 hours. (Leave on for 12 hours, then remove and leave off for 12 hours)      mv-min-FA-vit K-lutein-zeaxant (PreserVision AREDS 2 Plus MV) 200 mcg-15 mcg- 5 mg-1 mg capsule Take 1 capsule by mouth 2 times a day.      naproxen (Naprosyn) 500 mg tablet Take 1 tablet  (500 mg) by mouth 2 times a day with meals.      polyethylene glycol (Glycolax, Miralax) 17 gram/dose powder Take 17 g by mouth. 17 gram/dose powder      topiramate (Topamax) 25 mg tablet Take 1 tablet (25 mg) by mouth. Take per titration, fax to pharmacy       No current facility-administered medications on file prior to visit.       Lab Review:   Orders Only on 09/28/2023   Component Date Value    HIV-1 RNA Viral Load 09/28/2023 NOT DETECTED     HIV-1 RNA PCR Viral Load* 09/28/2023 NOT CALCULATED     Vitamin D, 25-Hydroxy 09/28/2023 22 (A)     Cholesterol 09/28/2023 272 (H)     HDL 09/28/2023 36.9 (A)     Cholesterol/HDL Ratio 09/28/2023 7.4 (A)     LDL 09/28/2023 -     VLDL 09/28/2023 SEE COMMENT     Triglycerides 09/28/2023 441 (H)     Non HDL Cholesterol 09/28/2023 235     Neisseria gonorrhea,Ampl* 09/28/2023 NEGATIVE     Chlamydia trachomatis, A* 09/28/2023 NEGATIVE     Glucose 09/28/2023 106 (H)     Sodium 09/28/2023 141     Potassium 09/28/2023 3.8     Chloride 09/28/2023 105     Bicarbonate 09/28/2023 27     Anion Gap 09/28/2023 13     Urea Nitrogen 09/28/2023 12     Creatinine 09/28/2023 0.85     GFR Female 09/28/2023 86     Calcium 09/28/2023 9.8     Albumin 09/28/2023 4.1     Alkaline Phosphatase 09/28/2023 76     Total Protein 09/28/2023 6.6     AST 09/28/2023 21     Total Bilirubin 09/28/2023 0.5     ALT (SGPT) 09/28/2023 33     Trichomonas Vaginalis 09/28/2023 NEGATIVE     Site 09/28/2023 BLOOD     CD3+CD4+% 09/28/2023 43     CD3+CD4+ Absolute 09/28/2023 1.281     CD3+CD8+% 09/28/2023 39 (H)     CD3+CD8+ Absolute 09/28/2023 1.162     CD4/CD8 Ratio 09/28/2023 1.10     CD45% 09/28/2023 100     Method 09/28/2023 SEE BELOW     WBC 09/28/2023 6.8     nRBC 09/28/2023 0.0     RBC 09/28/2023 4.11     Hemoglobin 09/28/2023 13.6     Hematocrit 09/28/2023 41.0     MCV 09/28/2023 100     MCHC 09/28/2023 33.2     Platelets 09/28/2023 293     RDW 09/28/2023 11.8     Neutrophils % 09/28/2023 45.4     Immature  Granulocytes %,* 09/28/2023 0.3     Lymphocytes % 09/28/2023 43.6     Monocytes % 09/28/2023 6.9     Eosinophils % 09/28/2023 3.2     Basophils % 09/28/2023 0.6     Neutrophils Absolute 09/28/2023 3.10     Lymphocytes Absolute 09/28/2023 2.98     Monocytes Absolute 09/28/2023 0.47     Eosinophils Absolute 09/28/2023 0.22     Basophils Absolute 09/28/2023 0.04          Time Spent:    Prep time: 1 min.  Direct patient time: 30 min.  Documentation time: 7 min.  Total time: 38 min.    Next Appointment:  Follow up in about 4 weeks (around 4/18/2024).

## 2024-03-22 ENCOUNTER — SOCIAL WORK (OUTPATIENT)
Dept: IMMUNOLOGY | Facility: CLINIC | Age: 44
End: 2024-03-22
Payer: COMMERCIAL

## 2024-03-22 NOTE — PROGRESS NOTES
[facility]     Mental Health Counseling Session Update - Protected Health Information           Date:   3-22-24           Virtual telehealth session with Ms. Chapa.                  Total  Time:   60 min                                       Progress:  Good      /  Fair                                                                                                                     Compliant with Psych. Meds:  Yes       Therapeutic Modality:  Supportive                          DBT                                                   Factors regarding Medical treatment:   Multiple medical/ physical concerns/ limitations.                                                                                    Changes (if any) to Initial Assessment:                                                                             Treatment Modality:  Individual    Suicidal/ Homicidal Concerns:  No     Factors aiding or impeding psychotherapy progress:   came     Processed client's thoughts, feelings, and actions.  Focused on distress tolerance, interpersonal effectiveness, and emotion regulation.  Explored ideas, choices, awareness, acceptance, and problem-solving.  Examined perceptions, assumptions, expectations, and realistic outcomes.  Talked about DBT tools that she's implementing daily.  Reflected on client's life experiences and progress.  Provided support and empathetic understanding.                                                       Next Session:    3-26-24     Kera Landry [facility]  Mental Health Counselor

## 2024-03-23 PROBLEM — F43.0 PANIC ATTACK AS REACTION TO STRESS: Status: ACTIVE | Noted: 2023-08-15

## 2024-03-23 RX ORDER — DIAZEPAM 5 MG/1
5 TABLET ORAL AS NEEDED
Qty: 14 TABLET | Refills: 1 | Status: SHIPPED | OUTPATIENT
Start: 2024-03-23 | End: 2024-04-28

## 2024-03-26 ENCOUNTER — SOCIAL WORK (OUTPATIENT)
Dept: IMMUNOLOGY | Facility: CLINIC | Age: 44
End: 2024-03-26
Payer: COMMERCIAL

## 2024-03-26 NOTE — PROGRESS NOTES
[facility]     Mental Health Counseling Session Update - Protected Health Information           Date:   3-26-24           Virtual telehealth session with Ms. Chapa.                  Total  Time:   60 min                                       Progress:  Good      /  Fair                                                                                                                     Compliant with Psych. Meds:  Yes       Therapeutic Modality:  Supportive                          DBT                                                   Factors regarding Medical treatment:   Multiple medical/ physical concerns/ limitations.                                                                                    Changes (if any) to Initial Assessment:                                                                             Treatment Modality:  Individual    Suicidal/ Homicidal Concerns:  No     Factors aiding or impeding psychotherapy progress:   came     Processed client's thoughts, feelings, and actions.  Focused on distress tolerance, interpersonal effectiveness, and emotion regulation.  Explored ideas, choices, awareness, acceptance, and problem-solving.  Examined perceptions, assumptions, expectations, and realistic outcomes.  Talked about DBT tools that she's implementing daily.  Reflected on client's life experiences and progress.  Provided support and empathetic understanding.                                                       Next Session:    4-2-24     Kera Landry [facility]  Mental Health Counselor

## 2024-04-02 ENCOUNTER — TREATMENT (OUTPATIENT)
Dept: PHYSICAL THERAPY | Facility: CLINIC | Age: 44
End: 2024-04-02
Payer: COMMERCIAL

## 2024-04-02 ENCOUNTER — TELEPHONE (OUTPATIENT)
Dept: IMMUNOLOGY | Facility: CLINIC | Age: 44
End: 2024-04-02

## 2024-04-02 DIAGNOSIS — M54.2 CERVICAL PAIN (NECK): Primary | ICD-10-CM

## 2024-04-02 DIAGNOSIS — M54.12 CERVICAL RADICULOPATHY: ICD-10-CM

## 2024-04-02 PROCEDURE — 97110 THERAPEUTIC EXERCISES: CPT | Mod: GP | Performed by: PHYSICAL THERAPIST

## 2024-04-02 PROCEDURE — 97140 MANUAL THERAPY 1/> REGIONS: CPT | Mod: GP | Performed by: PHYSICAL THERAPIST

## 2024-04-02 NOTE — PROGRESS NOTES
Physical Therapy Follow-up    Patient Name: Cherrie Chapa  MRN: [id number]  Today's Date: 4/2/2024         Visit#: 3. 20 approved.  Cert dates: 3/6/24-6/7/24    Precautions: None.    Subjective: 8/10 resting neck pain today- thinks sec to weather. Looking down and over shoulder ~ same. Last rx temporarily helpful. HEP going well.    Objective: AROM CS flex: wnl. Ext: wnl. Rot: wnl bilat- pain with right rot.    Treatment:   Therapeutic exercise: Scap pinch, right UT stretch,supine chin nods, Tband row- demo'd correctly.   Manual therapy: IV- bilat PA C3-T3, gentle upper CS txn x 3 min// Rot: wnl- decr pain.     Assessment: Decr sxs with rx. Demo's HEP correctly.    Plan: Incr postural exer.

## 2024-04-02 NOTE — TELEPHONE ENCOUNTER
4-2-24    Voice mail received from Ms. Chapa. She cancelled today's scheduled session. Returned he call and spoke with client. She rescheduled her counseling session for 4-4-24.    Kera Landry, [facility]  Mental Health Counselor

## 2024-04-03 ENCOUNTER — APPOINTMENT (OUTPATIENT)
Dept: PHYSICAL THERAPY | Facility: CLINIC | Age: 44
End: 2024-04-03
Payer: COMMERCIAL

## 2024-04-04 ENCOUNTER — SOCIAL WORK (OUTPATIENT)
Dept: IMMUNOLOGY | Facility: CLINIC | Age: 44
End: 2024-04-04
Payer: COMMERCIAL

## 2024-04-04 NOTE — PROGRESS NOTES
[facility]     Mental Health Counseling Session Update - Protected Health Information           Date:   4-4-24           Virtual telehealth session with Ms. Chapa.                  Total  Time:   60 min                                       Progress:  Good      /  Fair                                                                                                                     Compliant with Psych. Meds:  Yes       Therapeutic Modality:  Supportive                          DBT                                                   Factors regarding Medical treatment:   Multiple medical/ physical concerns/ limitations.                                                                                    Changes (if any) to Initial Assessment:                                                                             Treatment Modality:  Individual    Suicidal/ Homicidal Concerns:  No     Factors aiding or impeding psychotherapy progress:   came     Processed client's thoughts, feelings, and actions.  Focused on distress tolerance, interpersonal effectiveness, and emotion regulation.  Discussed thought, behavioral, emotional, and relationship patterns.  Talked about how difficult it is to change/ interrupt long standing survival skills/ patterns.  Explored ideas, choices, plans, awareness, acceptance, and problem-solving.  Examined perceptions, assumptions, expectations, and realistic outcomes.  Talked about DBT tools that she's implementing daily.  Reflected on client's life experiences and progress.  Provided support and empathetic understanding.                                                       Next Session:    4-9-24     Kera Landry [facility]  Mental Health Counselor

## 2024-04-08 ENCOUNTER — APPOINTMENT (OUTPATIENT)
Dept: IMMUNOLOGY | Facility: CLINIC | Age: 44
End: 2024-04-08
Payer: COMMERCIAL

## 2024-04-09 ENCOUNTER — TELEPHONE (OUTPATIENT)
Dept: IMMUNOLOGY | Facility: CLINIC | Age: 44
End: 2024-04-09
Payer: COMMERCIAL

## 2024-04-09 NOTE — TELEPHONE ENCOUNTER
4-9-24    Ms. Chapa did not log into the virtual telehealth platform link for today's scheduled telehealth session. Two phone attempts made, voice messages left for client.    Kera Landry, [facility]  Mental Health Counselor

## 2024-04-10 ENCOUNTER — APPOINTMENT (OUTPATIENT)
Dept: PHYSICAL THERAPY | Facility: CLINIC | Age: 44
End: 2024-04-10
Payer: COMMERCIAL

## 2024-04-10 ENCOUNTER — TELEPHONE (OUTPATIENT)
Dept: IMMUNOLOGY | Facility: CLINIC | Age: 44
End: 2024-04-10
Payer: COMMERCIAL

## 2024-04-10 NOTE — TELEPHONE ENCOUNTER
4-10-24    Ms. Chapa called and rescheduled her session for 4-16-24.    Kera Landry, [facility]  Mental Health Counselor

## 2024-04-15 ENCOUNTER — LAB (OUTPATIENT)
Dept: LAB | Facility: LAB | Age: 44
End: 2024-04-15
Payer: COMMERCIAL

## 2024-04-15 DIAGNOSIS — F43.0 PANIC ATTACK AS REACTION TO STRESS: ICD-10-CM

## 2024-04-15 DIAGNOSIS — E13.69 OTHER SPECIFIED DIABETES MELLITUS WITH OTHER SPECIFIED COMPLICATION, WITHOUT LONG-TERM CURRENT USE OF INSULIN (MULTI): ICD-10-CM

## 2024-04-15 DIAGNOSIS — E78.5 HYPERLIPIDEMIA, UNSPECIFIED HYPERLIPIDEMIA TYPE: ICD-10-CM

## 2024-04-15 DIAGNOSIS — B20 HUMAN IMMUNODEFICIENCY VIRUS (HIV) DISEASE (MULTI): ICD-10-CM

## 2024-04-15 DIAGNOSIS — Z79.899 HIGH RISK MEDICATION USE: ICD-10-CM

## 2024-04-15 DIAGNOSIS — Z11.3 ROUTINE SCREENING FOR STI (SEXUALLY TRANSMITTED INFECTION): ICD-10-CM

## 2024-04-15 DIAGNOSIS — B20 HIV DISEASE (MULTI): ICD-10-CM

## 2024-04-15 DIAGNOSIS — F41.0 PANIC ATTACK AS REACTION TO STRESS: ICD-10-CM

## 2024-04-15 DIAGNOSIS — E66.9 OBESITY (BMI 30-39.9): ICD-10-CM

## 2024-04-15 LAB
ALBUMIN SERPL BCP-MCNC: 4.2 G/DL (ref 3.4–5)
ALP SERPL-CCNC: 65 U/L (ref 33–110)
ALT SERPL W P-5'-P-CCNC: 16 U/L (ref 7–45)
AMPHETAMINES UR QL SCN: ABNORMAL
ANION GAP SERPL CALC-SCNC: 13 MMOL/L (ref 10–20)
AST SERPL W P-5'-P-CCNC: 15 U/L (ref 9–39)
BARBITURATES UR QL SCN: ABNORMAL
BASOPHILS # BLD AUTO: 0.03 X10*3/UL (ref 0–0.1)
BASOPHILS # BLD AUTO: 0.03 X10*3/UL (ref 0–0.1)
BASOPHILS NFR BLD AUTO: 0.3 %
BASOPHILS NFR BLD AUTO: 0.3 %
BENZODIAZ UR QL SCN: ABNORMAL
BILIRUB SERPL-MCNC: 0.5 MG/DL (ref 0–1.2)
BUN SERPL-MCNC: 9 MG/DL (ref 6–23)
BZE UR QL SCN: ABNORMAL
CALCIUM SERPL-MCNC: 9.7 MG/DL (ref 8.6–10.6)
CANNABINOIDS UR QL SCN: ABNORMAL
CHLORIDE SERPL-SCNC: 105 MMOL/L (ref 98–107)
CHOLEST SERPL-MCNC: 228 MG/DL (ref 0–199)
CHOLESTEROL/HDL RATIO: 6.3
CO2 SERPL-SCNC: 25 MMOL/L (ref 21–32)
CREAT SERPL-MCNC: 0.78 MG/DL (ref 0.5–1.05)
EGFRCR SERPLBLD CKD-EPI 2021: >90 ML/MIN/1.73M*2
EOSINOPHIL # BLD AUTO: 0.17 X10*3/UL (ref 0–0.7)
EOSINOPHIL # BLD AUTO: 0.18 X10*3/UL (ref 0–0.7)
EOSINOPHIL NFR BLD AUTO: 1.8 %
EOSINOPHIL NFR BLD AUTO: 2 %
ERYTHROCYTE [DISTWIDTH] IN BLOOD BY AUTOMATED COUNT: 12.1 % (ref 11.5–14.5)
ERYTHROCYTE [DISTWIDTH] IN BLOOD BY AUTOMATED COUNT: 12.2 % (ref 11.5–14.5)
EST. AVERAGE GLUCOSE BLD GHB EST-MCNC: 105 MG/DL
FENTANYL+NORFENTANYL UR QL SCN: ABNORMAL
GLUCOSE SERPL-MCNC: 112 MG/DL (ref 74–99)
HBA1C MFR BLD: 5.3 %
HCT VFR BLD AUTO: 39.6 % (ref 36–46)
HCT VFR BLD AUTO: 40.2 % (ref 36–46)
HDLC SERPL-MCNC: 36.1 MG/DL
HGB BLD-MCNC: 13.7 G/DL (ref 12–16)
HGB BLD-MCNC: 13.7 G/DL (ref 12–16)
IMM GRANULOCYTES # BLD AUTO: 0.03 X10*3/UL (ref 0–0.7)
IMM GRANULOCYTES # BLD AUTO: 0.03 X10*3/UL (ref 0–0.7)
IMM GRANULOCYTES NFR BLD AUTO: 0.3 % (ref 0–0.9)
IMM GRANULOCYTES NFR BLD AUTO: 0.3 % (ref 0–0.9)
LDLC SERPL CALC-MCNC: ABNORMAL MG/DL
LYMPHOCYTES # BLD AUTO: 3.78 X10*3/UL (ref 1.2–4.8)
LYMPHOCYTES # BLD AUTO: 4.09 X10*3/UL (ref 1.2–4.8)
LYMPHOCYTES NFR BLD AUTO: 41.6 %
LYMPHOCYTES NFR BLD AUTO: 43.9 %
MCH RBC QN AUTO: 32.6 PG (ref 26–34)
MCH RBC QN AUTO: 33 PG (ref 26–34)
MCHC RBC AUTO-ENTMCNC: 34.1 G/DL (ref 32–36)
MCHC RBC AUTO-ENTMCNC: 34.6 G/DL (ref 32–36)
MCV RBC AUTO: 95 FL (ref 80–100)
MCV RBC AUTO: 96 FL (ref 80–100)
METHADONE UR QL SCN: ABNORMAL
MONOCYTES # BLD AUTO: 0.63 X10*3/UL (ref 0.1–1)
MONOCYTES # BLD AUTO: 0.67 X10*3/UL (ref 0.1–1)
MONOCYTES NFR BLD AUTO: 6.9 %
MONOCYTES NFR BLD AUTO: 7.2 %
NEUTROPHILS # BLD AUTO: 4.33 X10*3/UL (ref 1.2–7.7)
NEUTROPHILS # BLD AUTO: 4.44 X10*3/UL (ref 1.2–7.7)
NEUTROPHILS NFR BLD AUTO: 46.5 %
NEUTROPHILS NFR BLD AUTO: 48.9 %
NON HDL CHOLESTEROL: 192 MG/DL (ref 0–149)
NRBC BLD-RTO: 0 /100 WBCS (ref 0–0)
NRBC BLD-RTO: 0 /100 WBCS (ref 0–0)
OPIATES UR QL SCN: ABNORMAL
OXYCODONE+OXYMORPHONE UR QL SCN: ABNORMAL
PCP UR QL SCN: ABNORMAL
PLATELET # BLD AUTO: 370 X10*3/UL (ref 150–450)
PLATELET # BLD AUTO: 377 X10*3/UL (ref 150–450)
POTASSIUM SERPL-SCNC: 3.8 MMOL/L (ref 3.5–5.3)
PROT SERPL-MCNC: 6.7 G/DL (ref 6.4–8.2)
RBC # BLD AUTO: 4.15 X10*6/UL (ref 4–5.2)
RBC # BLD AUTO: 4.2 X10*6/UL (ref 4–5.2)
SODIUM SERPL-SCNC: 139 MMOL/L (ref 136–145)
TREPONEMA PALLIDUM IGG+IGM AB [PRESENCE] IN SERUM OR PLASMA BY IMMUNOASSAY: NONREACTIVE
TRIGL SERPL-MCNC: 409 MG/DL (ref 0–149)
VLDL: ABNORMAL
WBC # BLD AUTO: 9.1 X10*3/UL (ref 4.4–11.3)
WBC # BLD AUTO: 9.3 X10*3/UL (ref 4.4–11.3)

## 2024-04-15 PROCEDURE — 80053 COMPREHEN METABOLIC PANEL: CPT

## 2024-04-15 PROCEDURE — 86780 TREPONEMA PALLIDUM: CPT

## 2024-04-15 PROCEDURE — 88185 FLOWCYTOMETRY/TC ADD-ON: CPT

## 2024-04-15 PROCEDURE — 80307 DRUG TEST PRSMV CHEM ANLYZR: CPT

## 2024-04-15 PROCEDURE — 80061 LIPID PANEL: CPT

## 2024-04-15 PROCEDURE — 87491 CHLMYD TRACH DNA AMP PROBE: CPT

## 2024-04-15 PROCEDURE — 80346 BENZODIAZEPINES1-12: CPT

## 2024-04-15 PROCEDURE — 83036 HEMOGLOBIN GLYCOSYLATED A1C: CPT

## 2024-04-15 PROCEDURE — 87661 TRICHOMONAS VAGINALIS AMPLIF: CPT

## 2024-04-15 PROCEDURE — 88184 FLOWCYTOMETRY/ TC 1 MARKER: CPT

## 2024-04-15 PROCEDURE — 36415 COLL VENOUS BLD VENIPUNCTURE: CPT

## 2024-04-15 PROCEDURE — 85025 COMPLETE CBC W/AUTO DIFF WBC: CPT

## 2024-04-16 ENCOUNTER — SOCIAL WORK (OUTPATIENT)
Dept: IMMUNOLOGY | Facility: CLINIC | Age: 44
End: 2024-04-16
Payer: COMMERCIAL

## 2024-04-16 LAB
C TRACH RRNA SPEC QL NAA+PROBE: NEGATIVE
CD3+CD4+ CELLS # BLD: 1.92 X10E9/L
CD3+CD4+ CELLS # BLD: 1922 /MM3
CD3+CD4+ CELLS NFR BLD: 47 %
CD3+CD4+ CELLS/CD3+CD8+ CLL BLD: 1.27 %
CD3+CD8+ CELLS # BLD: 1.51 X10E9/L
CD3+CD8+ CELLS NFR BLD: 37 %
LYMPHOCYTES # SPEC AUTO: 4.09 X10*3/UL
T VAGINALIS RRNA SPEC QL NAA+PROBE: NEGATIVE

## 2024-04-16 NOTE — PROGRESS NOTES
[facility]     Mental Health Counseling Session Update - Protected Health Information           Date:   4-16-24           Virtual telehealth session with Ms. Chapa.                  Total  Time:   60 min                                       Progress:  Good      /  Fair                                                                                                                     Compliant with Psych. Meds:  Yes       Therapeutic Modality:  Supportive                          DBT                                                   Factors regarding Medical treatment:   Multiple medical/ physical concerns/ limitations.                                                                                    Changes (if any) to Initial Assessment:                                                                             Treatment Modality:  Individual    Suicidal/ Homicidal Concerns:  No     Factors aiding or impeding psychotherapy progress:   came     Processed client's thoughts, feelings, and actions.  Focused on distress tolerance, interpersonal effectiveness, and emotion regulation.  Discussed thought, behavioral, emotional, and relationship patterns.  Talked about how difficult it is to change/ interrupt long standing survival skills/ patterns.  Explored ideas, choices, plans, awareness, acceptance, and problem-solving.  Examined perceptions, assumptions, expectations, and realistic outcomes.  Talked about DBT tools that she's implementing daily.  Reflected on client's life experiences and progress.  Provided support and empathetic understanding.                                                       Next Session:    4-23-24     Kera Landry [facility]  Mental Health Counselor

## 2024-04-19 LAB
1OH-MIDAZOLAM UR CFM-MCNC: <25 NG/ML
7AMINOCLONAZEPAM UR CFM-MCNC: <25 NG/ML
A-OH ALPRAZ UR CFM-MCNC: <25 NG/ML
ALPRAZ UR CFM-MCNC: <25 NG/ML
CHLORDIAZEP UR CFM-MCNC: <25 NG/ML
CLONAZEPAM UR CFM-MCNC: <25 NG/ML
DIAZEPAM UR CFM-MCNC: <25 NG/ML
LORAZEPAM UR CFM-MCNC: <25 NG/ML
MIDAZOLAM UR CFM-MCNC: <25 NG/ML
NORDIAZEPAM UR CFM-MCNC: 145 NG/ML
OXAZEPAM UR CFM-MCNC: 274 NG/ML
TEMAZEPAM UR CFM-MCNC: 472 NG/ML

## 2024-04-23 ENCOUNTER — SOCIAL WORK (OUTPATIENT)
Dept: IMMUNOLOGY | Facility: CLINIC | Age: 44
End: 2024-04-23

## 2024-04-23 NOTE — PROGRESS NOTES
[facility]     Mental Health Counseling Session Update - Protected Health Information           Date:   4-23-24           Virtual telehealth session with Ms. Chapa.                  Total  Time:   60 min                                       Progress:  Good      /  Fair                                                                                                                     Compliant with Psych. Meds:  Yes       Therapeutic Modality:  Supportive                          DBT                                                   Factors regarding Medical treatment:   Multiple medical/ physical concerns/ limitations.                                                                                    Changes (if any) to Initial Assessment:                                                                             Treatment Modality:  Individual    Suicidal/ Homicidal Concerns:  No     Factors aiding or impeding psychotherapy progress:   came     Processed client's thoughts, feelings, and actions.  Focused on distress tolerance, interpersonal effectiveness, and emotion regulation.  Discussed thought, behavioral, emotional, and relationship patterns.  Talked about how difficult it is to change/ interrupt long standing survival skills/ patterns.  Explored ideas, choices, plans, awareness, acceptance, and problem-solving.  Examined perceptions, assumptions, expectations, and realistic outcomes.  Talked about DBT tools that she's implementing daily.  Reflected on client's life experiences and progress.  Provided support and empathetic understanding.                                                       Next Session:    4-23-24     Kera Landry [facility]  Mental Health Counselor

## 2024-04-26 ENCOUNTER — OFFICE VISIT (OUTPATIENT)
Dept: OPHTHALMOLOGY | Facility: CLINIC | Age: 44
End: 2024-04-26
Payer: COMMERCIAL

## 2024-04-26 ENCOUNTER — OFFICE VISIT (OUTPATIENT)
Dept: BEHAVIORAL HEALTH | Facility: CLINIC | Age: 44
End: 2024-04-26
Payer: COMMERCIAL

## 2024-04-26 VITALS
BODY MASS INDEX: 36.13 KG/M2 | TEMPERATURE: 98.4 F | HEART RATE: 66 BPM | HEIGHT: 63 IN | SYSTOLIC BLOOD PRESSURE: 112 MMHG | RESPIRATION RATE: 16 BRPM | WEIGHT: 203.9 LBS | DIASTOLIC BLOOD PRESSURE: 53 MMHG

## 2024-04-26 DIAGNOSIS — H35.3132 INTERMEDIATE STAGE NONEXUDATIVE AGE-RELATED MACULAR DEGENERATION OF BOTH EYES: Primary | ICD-10-CM

## 2024-04-26 DIAGNOSIS — F51.04 PSYCHOPHYSIOLOGICAL INSOMNIA: ICD-10-CM

## 2024-04-26 DIAGNOSIS — F31.81 BIPOLAR 2 DISORDER (MULTI): ICD-10-CM

## 2024-04-26 DIAGNOSIS — F41.1 GAD (GENERALIZED ANXIETY DISORDER): Primary | ICD-10-CM

## 2024-04-26 DIAGNOSIS — R45.4 IRRITABILITY AND ANGER: ICD-10-CM

## 2024-04-26 DIAGNOSIS — F43.10 PTSD (POST-TRAUMATIC STRESS DISORDER): ICD-10-CM

## 2024-04-26 DIAGNOSIS — F41.9 ANXIETY: ICD-10-CM

## 2024-04-26 DIAGNOSIS — F42.8 OBSESSIVE THINKING: ICD-10-CM

## 2024-04-26 PROCEDURE — 99214 OFFICE O/P EST MOD 30 MIN: CPT | Performed by: OPTOMETRIST

## 2024-04-26 PROCEDURE — 92134 CPTRZ OPH DX IMG PST SGM RTA: CPT | Mod: BILATERAL PROCEDURE | Performed by: OPTOMETRIST

## 2024-04-26 PROCEDURE — 99214 OFFICE O/P EST MOD 30 MIN: CPT | Performed by: NURSE PRACTITIONER

## 2024-04-26 RX ORDER — HYDROXYZINE HYDROCHLORIDE 25 MG/1
25 TABLET, FILM COATED ORAL DAILY PRN
Qty: 90 TABLET | Refills: 1 | Status: SHIPPED | OUTPATIENT
Start: 2024-04-26 | End: 2025-04-26

## 2024-04-26 ASSESSMENT — ENCOUNTER SYMPTOMS
HEMATOLOGIC/LYMPHATIC NEGATIVE: 0
NEUROLOGICAL NEGATIVE: 0
CARDIOVASCULAR NEGATIVE: 0
ALLERGIC/IMMUNOLOGIC NEGATIVE: 0
GASTROINTESTINAL NEGATIVE: 0
RESPIRATORY NEGATIVE: 0
PSYCHIATRIC NEGATIVE: 0
ENDOCRINE NEGATIVE: 0
CONSTITUTIONAL NEGATIVE: 0
MUSCULOSKELETAL NEGATIVE: 0
EYES NEGATIVE: 0

## 2024-04-26 ASSESSMENT — REFRACTION_WEARINGRX
OD_AXIS: 091
OS_ADD: +1.50
OS_SPHERE: +0.50
OD_CYLINDER: -0.75
OS_AXIS: 081
OD_ADD: +1.50
OS_CYLINDER: -1.00
OD_SPHERE: +0.50

## 2024-04-26 ASSESSMENT — VISUAL ACUITY
OS_CC+: -1
METHOD: SNELLEN - LINEAR
OD_CC+: -1
OS_CC: J1+
OD_CC: J1+
OD_CC: 20/20
OS_CC: 20/20

## 2024-04-26 ASSESSMENT — CONF VISUAL FIELD
OS_SUPERIOR_NASAL_RESTRICTION: 0
OS_INFERIOR_TEMPORAL_RESTRICTION: 0
OS_NORMAL: 1
OS_INFERIOR_NASAL_RESTRICTION: 0
OS_SUPERIOR_TEMPORAL_RESTRICTION: 0
OD_INFERIOR_TEMPORAL_RESTRICTION: 0
OD_SUPERIOR_TEMPORAL_RESTRICTION: 0
OD_INFERIOR_NASAL_RESTRICTION: 0
METHOD: COUNTING FINGERS
OD_SUPERIOR_NASAL_RESTRICTION: 0
OD_NORMAL: 1

## 2024-04-26 ASSESSMENT — CUP TO DISC RATIO
OD_RATIO: 0.3
OS_RATIO: 0.3

## 2024-04-26 ASSESSMENT — TONOMETRY
OD_IOP_MMHG: 16
IOP_METHOD: GOLDMANN APPLANATION
OS_IOP_MMHG: 16

## 2024-04-26 ASSESSMENT — EXTERNAL EXAM - LEFT EYE: OS_EXAM: NORMAL

## 2024-04-26 ASSESSMENT — SLIT LAMP EXAM - LIDS
COMMENTS: NORMAL
COMMENTS: NORMAL

## 2024-04-26 ASSESSMENT — EXTERNAL EXAM - RIGHT EYE: OD_EXAM: NORMAL

## 2024-04-26 NOTE — PROGRESS NOTES
Assessment/Plan   Diagnoses and all orders for this visit:  Intermediate stage nonexudative age-related macular degeneration of both eyes  -     OCT, Retina - OU - Both Eye  Stable. Not VS at this time. Continue w/ AREDS 2 po bid. Sun protection outdoors, healthy diet, exercise. Continue no smoking. Monitor weekly w/ Amsler grid. RTC 6 months for comprehensive exam w/ OCT macula or ASAP w/ changes on grid or in vision.

## 2024-04-26 NOTE — PROGRESS NOTES
"Adult Ambulatory Psychiatry Progress Note      Assessment/Plan     Impression:  Cherrie Chapa is a 44 y.o. female domiciled  with 2 children, on disability, who presents for follow up with CC of \"I am ok. I am trying to stay a little more upbeat. It's 2 years this month that my best friend . I am just trying to be more active and I enrolled at the gym and taking Norman with me. I am fatigued but that is fine.\"    Plan: Patient is being seen for the first time in person as requested. Patient was cooperative and open that she is still working on her anxiety symptoms with therapy and feels that current medications are working. UTOX did reflect being positive for THC which patient did indicate at last appt she had stopped taking. Patient did sign CSA. However reviewing latest OARRS reveals patient is still taking medical THC. No more refills of Valium can occur going forward as a result. Continues to see her therapist, Ms. Landry weekly.    Medication: Rexulti 4mg at bedtime, Zoloft 150mg every day. Atarax 25-50mg as needed for anxiety spikes and for additional sleep aid. Can take Valium 5mg as needed for panic attacks in response to severe stress (No further refills due to non-compliance with still having medical THC being refilled against request to stop using).    Reviewed r/b/a, possible side effects of the medication. Client is aware about the benefit outweighs the risk.     Diagnoses and all orders for this visit:  TIM (generalized anxiety disorder)  Anxiety  -     hydrOXYzine HCL (Atarax) 25 mg tablet; Take 1 tablet (25 mg) by mouth once daily as needed for anxiety.  Bipolar 2 disorder (Multi)  Irritability and anger  Obsessive thinking  PTSD (post-traumatic stress disorder)  Psychophysiological insomnia    Therapy: none    Other: n/a    Patient is reminded that if there is SI to call [phone] and get themselves to the closest ED for evaluation, otherwise contact me for other questions/concerns. " "    Subjective   HPI:  \"Both the patient, and family and caregivers and guardians as appropriate, were informed of the current need to conduct treatment via telephone. I have confirmed the patient's identity via the following (minimum of three) acceptable identifiers as per  Policy PH-9: , [place] home address.\"     Present Illness - anxiety, irritability and anger     Characteristics/Recent psychiatric symptoms (pertinent positives and negatives) - reports overall mood is getting slowly better but can still feel frustration with the apartment apartment taking far too long with re-upping her lease with legal managing the process, and her son having his braces for his teeth being denied by [facility]. Reports continus to make sure to stay out of her bedroom, and as it is just herself and her son living in the apartment, they do more activities together. Revealed can be easily overwhelmed and will find herself spinning her wheels, where \"I will forget where I put my keys, and other items and I will literally pacing around the apartment. Like my aunt was over 3 weeks ago and I got distracted and worried with getting caught in my thoughts of worrying with where things were and I literally realized how I was so spiralling that I had to stop and take time to let myself calm down. My aunt noticed that happened. Or a friend saw my getting worked over an email, from the legal team about missing something, and I started to obsess about the missing item and I could not slow down my thoughts where I had to find the information and I couldn't slow down - I was so worked up over it. But I had to slow down and get myself back into a more gracie mode.' Reports her feeling depressed is 'more mild and no more like how bad it had gotten in the past. I will have some moments of second guessing myself but they don't last long.' Admits during the time of the eclipse recently, she noticed how there was more security set up in the area, " "and 'I felt safer and I was not as anxious and when I got home I wasn't as fatigued as I would normally. I feel that was because my anxiety hasn't been as bad as before.' Reports with making effort to not napping as much, she is now sleeping an hour longer during the day time, getting 6-7 hours at night. Reports making sure to set boundaries with different people in her life, especially with the  and her manipulations, and \"I just have to not let it affect me, as it will just take me down as I need to take me. I have to not let it trigger me otherwise I will go downhill again. I need to focus on my own self-care.' Reports son now has a 504 put in place with accommodations set for his ADHD and test anxiety as he is a smart student otherwise and don't want him to get kicked out of school. Continues to see her therapist weekly now down to only once a week from twice a week and aims to go back to in person by next month. \"With things still going on my life however, I am learning to ride the waves, and they are less intense.\" Reports still making effort to work on her appetite and 'tries to' do intermittent fasting. Reports noticing occasional racing, and obsessive thoughts situationally when feeling overwhelmed in general.     Onset/timeframe - 1 month  Type - anxiety, irritability and anger  Duration - situational  Aggravating and/or relieving factors/triggers - feels medications are continuing to help with her mood, and doing DBT with her therapist is working on stepping outside of her comfort zone (bedroom, apartment) and keeping her temper in check which is helping her mood. Still has panic attacks when out in public or in crowds for too long however.  Related symptoms  Treatment and treatment changes (new meds, dosage increases or decreases, med compliance, therapy frequency, etc.) (Past and Recent) - She sees Megan Landry, her therapist twice a week with [facility] - DCBT and " psychodynamic therapy. Rexulti 4mg @[facility], Zoloft 150mg QD, Diazepam 5mg 1 tablet PRN daily (on hold), Atarax 25-50mg PRN. Topamax 75mg BID (managed by pain management doctor) for neck pain     Issues: Denies SI/HI/AVH.           Review of Systems   Constitutional: Negative.    HENT: Negative.     Eyes:  Positive for photophobia.        Just came from eye exam with drop for dilation so light sensitivity is noted   Allergic/Immunologic: Positive for immunocompromised state.   Psychiatric/Behavioral:  The patient is nervous/anxious.        OARRS:  Moody Peoples, APRN-CNP on 4/28/2024  1:59 PM  I have personally reviewed the OARRS report for Cherrie Chapa. I have considered the risks of abuse, dependence, addiction and diversion    Is the patient prescribed a combination of a benzodiazepine and opioid?  No    Last Urine Drug Screen / ordered today: Yes  Recent Results (from the past 8760 hour(s))   Benzodiazepine Confirmation, Urine    Collection Time: 04/15/24  4:46 PM   Result Value Ref Range    Clonazepam <25 <25 ng/mL    7-Aminoclonazepam <25 <25 ng/mL    Alprazolam <25 <25 ng/mL    Alpha-Hydroxyalprazolam <25 <25 ng/mL    Midazolam <25 <25 ng/mL    Alpha-Hydroxymidazolam <25 <25 ng/mL    Chlordiazepoxide <25 <25 ng/mL    Diazepam <25 <25 ng/mL    Nordiazepam 145 (H) <25 ng/mL    Temazepam 472 (H) <25 ng/mL    Oxazepam 274 (H) <25 ng/mL    Lorazepam <25 <25 ng/mL   Drug Screen, Urine With Reflex to Confirmation    Collection Time: 04/15/24  4:46 PM   Result Value Ref Range    Amphetamine Screen, Urine Presumptive Negative Presumptive Negative    Barbiturate Screen, Urine Presumptive Negative Presumptive Negative    Benzodiazepines Screen, Urine Presumptive Positive (A) Presumptive Negative    Cannabinoid Screen, Urine Presumptive Negative Presumptive Negative    Cocaine Metabolite Screen, Urine Presumptive Negative Presumptive Negative    Fentanyl Screen, Urine Presumptive Negative Presumptive Negative    Opiate  Screen, Urine Presumptive Negative Presumptive Negative    Oxycodone Screen, Urine Presumptive Negative Presumptive Negative    PCP Screen, Urine Presumptive Negative Presumptive Negative    Methadone Screen, Urine Presumptive Negative Presumptive Negative     N/A    Clinical rationale for not completing a Urine Drug Screen: Restarting orders for drug screen put in place.      Controlled Substance Agreement:  Date of the Last Agreement: 03/23/2024    Objective   Mental Status Exam:  General Appearance: Well groomed, appropriate eye contact  Attitude/Behavior: Cooperative, Fair eye contact (just came from eye doctor with eye drops to dilate her eyes for examination so minimized gaze or wore sunglasses)  Motor: No psychomotor agitation or retardation, no tremor or other abnormal movements  Speech: Normal rate, volume, prosody  Gait/Station: WFL - Within functional limits  Mood: 'ok, doing my best'  Affect: Euthymic, full-range, Constricted, Anxious, Congruent with mood and topic of conversation  Thought Process: Linear, goal directed, Perseverative  Thought Associations: Occasional derailment  Thought Content: Normal, Other: (comment) (making the most of her current life situation, w/ still dealing with the landlord/legal issues (on her side however), and making effort to get out of her apartment more to overcome her social anxiety, and continues to do therapy.)  Perception: No perceptual abnormalities noted  Sensorium: Alert and oriented to person, place, time and situation  Insight: Fair  Judgement: Intact  Cognition: Cognitively intact to conversational testing with respect to attention, orientation, fund of knowledge, recent and remote memory, and language  Testing: N/A    TIM-7/PHQ-9 scores reviewed: 13, 13 compared to 13, 20 reflecting no change with anxiety and some improvement with depression/mood.        Current Medications:  Current Outpatient Medications on File Prior to Visit   Medication Sig Dispense  Refill    brexpiprazole 4 mg tablet Take 4 mg by mouth once daily. 60 tablet 11    sertraline (Zoloft) 100 mg tablet Take 1 tablet (100 mg) by mouth once daily. 1 and .5 tablets daily 90 tablet 3    [DISCONTINUED] hydrOXYzine HCL (Atarax) 25 mg tablet Take 1 tablet (25 mg) by mouth 3 times a day as needed.      acetaminophen (Tylenol) 325 mg tablet Take 2 tablets (650 mg) by mouth every 6 hours.      albuterol (Ventolin HFA) 90 mcg/actuation inhaler Inhale 1-2 puffs. Every 4-6 hours      Biktarvy -25 mg tablet take 1 tablet by mouth once daily 30 tablet 5    cyclobenzaprine (Flexeril) 5 mg tablet Take 1 tablet (5 mg) by mouth 3 times a day as needed for muscle spasms. 30 tablet 2    diazePAM (Valium) 5 mg tablet Take 1 tablet (5 mg) by mouth if needed for anxiety for up to 14 days. 14 tablet 1    ergocalciferol (Vitamin D-2) 1.25 MG (52728 UT) capsule Take 1 capsule (1,250 mcg) by mouth 1 (one) time per week.      ibuprofen 600 mg tablet Take 1 tablet (600 mg) by mouth 3 times a day with meals. 90 tablet 2    lidocaine (Lidoderm) 5 % patch Place 1 patch on the skin every 12 hours. (Leave on for 12 hours, then remove and leave off for 12 hours)      mv-min-FA-vit K-lutein-zeaxant (PreserVision AREDS 2 Plus MV) 200 mcg-15 mcg- 5 mg-1 mg capsule Take 1 capsule by mouth 2 times a day.      naproxen (Naprosyn) 500 mg tablet Take 1 tablet (500 mg) by mouth 2 times a day with meals.      polyethylene glycol (Glycolax, Miralax) 17 gram/dose powder Take 17 g by mouth. 17 gram/dose powder      topiramate (Topamax) 25 mg tablet Take 1 tablet (25 mg) by mouth. Take per titration, fax to pharmacy       No current facility-administered medications on file prior to visit.       Lab Review:   Lab on 04/15/2024   Component Date Value    WBC 04/15/2024 9.1     nRBC 04/15/2024 0.0     RBC 04/15/2024 4.20     Hemoglobin 04/15/2024 13.7     Hematocrit 04/15/2024 40.2     MCV 04/15/2024 96     MCH 04/15/2024 32.6     MCHC  04/15/2024 34.1     RDW 04/15/2024 12.2     Platelets 04/15/2024 377     Neutrophils % 04/15/2024 48.9     Immature Granulocytes %,* 04/15/2024 0.3     Lymphocytes % 04/15/2024 41.6     Monocytes % 04/15/2024 6.9     Eosinophils % 04/15/2024 2.0     Basophils % 04/15/2024 0.3     Neutrophils Absolute 04/15/2024 4.44     Immature Granulocytes Ab* 04/15/2024 0.03     Lymphocytes Absolute 04/15/2024 3.78     Monocytes Absolute 04/15/2024 0.63     Eosinophils Absolute 04/15/2024 0.18     Basophils Absolute 04/15/2024 0.03     Chlamydia trachomatis, A* 04/15/2024 Negative     Glucose 04/15/2024 112 (H)     Sodium 04/15/2024 139     Potassium 04/15/2024 3.8     Chloride 04/15/2024 105     Bicarbonate 04/15/2024 25     Anion Gap 04/15/2024 13     Urea Nitrogen 04/15/2024 9     Creatinine 04/15/2024 0.78     eGFR 04/15/2024 >90     Calcium 04/15/2024 9.7     Albumin 04/15/2024 4.2     Alkaline Phosphatase 04/15/2024 65     Total Protein 04/15/2024 6.7     AST 04/15/2024 15     Bilirubin, Total 04/15/2024 0.5     ALT 04/15/2024 16     Hemoglobin A1C 04/15/2024 5.3     Estimated Average Glucose 04/15/2024 105     Cholesterol 04/15/2024 228 (H)     HDL-Cholesterol 04/15/2024 36.1     Cholesterol/HDL Ratio 04/15/2024 6.3     LDL Calculated 04/15/2024      VLDL 04/15/2024      Triglycerides 04/15/2024 409 (H)     Non HDL Cholesterol 04/15/2024 192 (H)     Syphilis Total Ab 04/15/2024 Nonreactive     Trichomonas Vaginalis 04/15/2024 Negative     Amphetamine Screen, Urine 04/15/2024 Presumptive Negative     Barbiturate Screen, Urine 04/15/2024 Presumptive Negative     Benzodiazepines Screen, * 04/15/2024 Presumptive Positive (A)     Cannabinoid Screen, Urine 04/15/2024 Presumptive Negative     Cocaine Metabolite Scree* 04/15/2024 Presumptive Negative     Fentanyl Screen, Urine 04/15/2024 Presumptive Negative     Opiate Screen, Urine 04/15/2024 Presumptive Negative     Oxycodone Screen, Urine 04/15/2024 Presumptive Negative     PCP  Screen, Urine 04/15/2024 Presumptive Negative     Methadone Screen, Urine 04/15/2024 Presumptive Negative     Lymphocyte Absolute 04/15/2024 4.09     CD3+CD4+% 04/15/2024 47     CD3+CD4+ Absolute 04/15/2024 1.922     CD3+CD8+% 04/15/2024 37 (H)     CD3+CD8+ Absolute 04/15/2024 1.513 (H)     CD4/CD8 Ratio 04/15/2024 1.27     CD3+CD4+ Absolute (/mm3) 04/15/2024 1,922     WBC 04/15/2024 9.3     nRBC 04/15/2024 0.0     RBC 04/15/2024 4.15     Hemoglobin 04/15/2024 13.7     Hematocrit 04/15/2024 39.6     MCV 04/15/2024 95     MCH 04/15/2024 33.0     MCHC 04/15/2024 34.6     RDW 04/15/2024 12.1     Platelets 04/15/2024 370     Neutrophils % 04/15/2024 46.5     Immature Granulocytes %,* 04/15/2024 0.3     Lymphocytes % 04/15/2024 43.9     Monocytes % 04/15/2024 7.2     Eosinophils % 04/15/2024 1.8     Basophils % 04/15/2024 0.3     Neutrophils Absolute 04/15/2024 4.33     Immature Granulocytes Ab* 04/15/2024 0.03     Lymphocytes Absolute 04/15/2024 4.09     Monocytes Absolute 04/15/2024 0.67     Eosinophils Absolute 04/15/2024 0.17     Basophils Absolute 04/15/2024 0.03     Clonazepam 04/15/2024 <25     7-Aminoclonazepam 04/15/2024 <25     Alprazolam 04/15/2024 <25     Alpha-Hydroxyalprazolam 04/15/2024 <25     Midazolam 04/15/2024 <25     Alpha-Hydroxymidazolam 04/15/2024 <25     Chlordiazepoxide 04/15/2024 <25     Diazepam 04/15/2024 <25     Nordiazepam 04/15/2024 145 (H)     Temazepam 04/15/2024 472 (H)     Oxazepam 04/15/2024 274 (H)     Lorazepam 04/15/2024 <25          Time Spent:    Prep time: 1 min.  Direct patient time: 28 min.  Documentation time: 7 min.  Total time: 36 min.    Next Appointment:  Follow up in about 2 months (around 6/26/2024).

## 2024-04-28 PROBLEM — F41.1 GAD (GENERALIZED ANXIETY DISORDER): Status: ACTIVE | Noted: 2024-04-28

## 2024-04-28 ASSESSMENT — ENCOUNTER SYMPTOMS
PHOTOPHOBIA: 1
NERVOUS/ANXIOUS: 1
CONSTITUTIONAL NEGATIVE: 1

## 2024-04-30 ENCOUNTER — TELEPHONE (OUTPATIENT)
Dept: IMMUNOLOGY | Facility: CLINIC | Age: 44
End: 2024-04-30
Payer: COMMERCIAL

## 2024-04-30 NOTE — TELEPHONE ENCOUNTER
4-30-24    Voice mail received from Ms. Chapa. Returned her call and spoke with client. She switched today's session to 5-2-24.    Kera Landry, [facility]  Mental Health Counselor

## 2024-05-02 ENCOUNTER — SOCIAL WORK (OUTPATIENT)
Dept: IMMUNOLOGY | Facility: CLINIC | Age: 44
End: 2024-05-02
Payer: COMMERCIAL

## 2024-05-02 NOTE — PROGRESS NOTES
[facility]     Mental Health Counseling Session Update - Protected Health Information           Date:   5-2-24           Virtual telehealth session with Ms. Chapa.                  Total  Time:   60 min                                       Progress:  Good      /  Fair                                                                                                                     Compliant with Psych. Meds:  Yes       Therapeutic Modality:  Supportive                          DBT                                                   Factors regarding Medical treatment:   Multiple medical/ physical concerns/ limitations.                                                                                    Changes (if any) to Initial Assessment:                                                                             Treatment Modality:  Individual    Suicidal/ Homicidal Concerns:  No     Factors aiding or impeding psychotherapy progress:   came     Processed client's thoughts, feelings, and actions.  Focused on distress tolerance, interpersonal effectiveness, and emotion regulation.  Discussed thought, behavioral, emotional, and relationship patterns.  Talked about how difficult it is to change/ interrupt long standing survival skills/ patterns.  Explored ideas, choices, plans, awareness, acceptance, and problem-solving.  Examined perceptions, assumptions, expectations, and realistic outcomes.  Talked about DBT tools that she's implementing daily.  Reflected on client's life experiences and progress.  Provided support and empathetic understanding.                                                       Next Session:    5-7-24     Kera Landry [facility]  Mental Health Counselor

## 2024-05-07 ENCOUNTER — SOCIAL WORK (OUTPATIENT)
Dept: IMMUNOLOGY | Facility: CLINIC | Age: 44
End: 2024-05-07
Payer: COMMERCIAL

## 2024-05-07 NOTE — PROGRESS NOTES
[facility]     Mental Health Counseling Session Update - Protected Health Information           Date:   5-7-24           Virtual telehealth session with Ms. Chapa.                  Total  Time:   60 min                                       Progress:  Good      /  Fair                                                                                                                     Compliant with Psych. Meds:  Yes       Therapeutic Modality:  Supportive                          DBT                                                   Factors regarding Medical treatment:   Multiple medical/ physical concerns/ limitations.                                                                                    Changes (if any) to Initial Assessment:                                                                             Treatment Modality:  Individual    Suicidal/ Homicidal Concerns:  No     Factors aiding or impeding psychotherapy progress:   came     Processed client's thoughts, feelings, and actions.  Focused on distress tolerance, interpersonal effectiveness, and emotion regulation.  Discussed thought, behavioral, emotional, and relationship patterns.  Talked about how difficult it is to change/ interrupt long standing survival skills/ patterns.  Explored ideas, choices, plans, awareness, acceptance, and problem-solving.  Examined perceptions, assumptions, expectations, and realistic outcomes.  Talked about DBT tools that she's implementing daily.  Reflected on client's life experiences and progress.  Provided support and empathetic understanding.                                                       Next Session:    5-14-24     Kera Landry [facility]  Mental Health Counselor

## 2024-05-14 ENCOUNTER — SOCIAL WORK (OUTPATIENT)
Dept: IMMUNOLOGY | Facility: CLINIC | Age: 44
End: 2024-05-14
Payer: COMMERCIAL

## 2024-05-14 NOTE — PROGRESS NOTES
[facility]     Mental Health Counseling Session Update - Protected Health Information           Date:   5-14-24           In person session with Ms. Chapa.                  Total  Time:   60 min                                       Progress:  Good           /  Fair                                                                                                                       Compliant with Psych. Meds:  Yes       Therapeutic Modality:  Supportive                          DBT                                                   Factors regarding Medical treatment:   Multiple medical/ physical concerns/ limitations.                                                                                    Changes (if any) to Initial Assessment:                                                                             Treatment Modality:  Individual    Suicidal/ Homicidal Concerns:  No     Factors aiding or impeding psychotherapy progress:   came     Processed client's thoughts, feelings, and actions.  Focused on distress tolerance, interpersonal effectiveness, and emotion regulation.  Discussed thought, behavioral, emotional, and relationship patterns.  Talked about how difficult it is to change/ interrupt long standing survival skills/ patterns.  Explored ideas, choices, plans, awareness, acceptance, and problem-solving.  Examined perceptions, assumptions, expectations, and realistic outcomes.  Looked at setting boundaries/ limits as making personalized choices saying no or yes.  Talked about DBT tools that she's implementing daily.  Reflected on client's life experiences and progress.  Provided support and empathetic understanding.                                                       Next Session:    5-30-24     Kera Landry [facility]  Mental Health Counselor

## 2024-05-30 ENCOUNTER — TELEPHONE (OUTPATIENT)
Dept: IMMUNOLOGY | Facility: CLINIC | Age: 44
End: 2024-05-30
Payer: COMMERCIAL

## 2024-05-30 NOTE — TELEPHONE ENCOUNTER
5-30-42    Ms. Chapa did not come in for today's scheduled counseling session.  Two phone attempts made, voice messages left for client.      Kera Landry, [facility]  Mental Health Counselor

## 2024-06-04 ENCOUNTER — TELEPHONE (OUTPATIENT)
Dept: IMMUNOLOGY | Facility: CLINIC | Age: 44
End: 2024-06-04
Payer: COMMERCIAL

## 2024-06-04 NOTE — TELEPHONE ENCOUNTER
6-4-24    Voice mail received from Ms. Chapa.  Return voice mail left for client. Provided a new session time on 6-18-24.    Kera Landry [facility]  Mental Health Counselor

## 2024-06-15 DIAGNOSIS — B20 HIV INFECTION, UNSPECIFIED SYMPTOM STATUS (MULTI): ICD-10-CM

## 2024-06-17 ENCOUNTER — TELEPHONE (OUTPATIENT)
Dept: IMMUNOLOGY | Facility: CLINIC | Age: 44
End: 2024-06-17
Payer: COMMERCIAL

## 2024-06-17 DIAGNOSIS — E66.9 OBESITY (BMI 30-39.9): ICD-10-CM

## 2024-06-17 DIAGNOSIS — R06.09 OTHER FORM OF DYSPNEA: ICD-10-CM

## 2024-06-17 DIAGNOSIS — Z11.3 ROUTINE SCREENING FOR STI (SEXUALLY TRANSMITTED INFECTION): ICD-10-CM

## 2024-06-17 DIAGNOSIS — Z79.899 HIGH RISK MEDICATION USE: ICD-10-CM

## 2024-06-17 DIAGNOSIS — Z13.1 ENCOUNTER FOR SCREENING FOR DIABETES MELLITUS: ICD-10-CM

## 2024-06-17 DIAGNOSIS — E13.9 OTHER SPECIFIED DIABETES MELLITUS WITHOUT COMPLICATION, WITHOUT LONG-TERM CURRENT USE OF INSULIN (MULTI): ICD-10-CM

## 2024-06-17 DIAGNOSIS — B20 HIV INFECTION, UNSPECIFIED SYMPTOM STATUS (MULTI): ICD-10-CM

## 2024-06-17 DIAGNOSIS — E78.5 HYPERLIPIDEMIA, UNSPECIFIED HYPERLIPIDEMIA TYPE: ICD-10-CM

## 2024-06-17 RX ORDER — BICTEGRAVIR SODIUM, EMTRICITABINE, AND TENOFOVIR ALAFENAMIDE FUMARATE 50; 200; 25 MG/1; MG/1; MG/1
1 TABLET ORAL DAILY
Qty: 30 TABLET | Refills: 1 | Status: SHIPPED | OUTPATIENT
Start: 2024-06-17

## 2024-06-17 RX ORDER — ALBUTEROL SULFATE 90 UG/1
2 AEROSOL, METERED RESPIRATORY (INHALATION) EVERY 6 HOURS PRN
Qty: 18 G | Refills: 1 | Status: SHIPPED | OUTPATIENT
Start: 2024-06-17

## 2024-06-17 NOTE — TELEPHONE ENCOUNTER
6-17-24    Voice mail received from Ms. Chapa.  Return voice mail left for client. Confirmed virtual telehealth counseling appointment time on 6-18-24.    Kera Landry, [facility]  Mental Health Counselor

## 2024-06-17 NOTE — PROGRESS NOTES
Patient called requesting a refill on her Biktarvy and an inhaler.  Has been a little SOB due to very hot and humid weather.  RN asked if patient can come in for lab work as we do not have a recent viral load on her.  Can come in July as has appointment with Dr. Yu August 2, 2024.  Per patient, she will come in July 17, 2024.

## 2024-06-18 ENCOUNTER — TELEPHONE (OUTPATIENT)
Dept: IMMUNOLOGY | Facility: CLINIC | Age: 44
End: 2024-06-18
Payer: COMMERCIAL

## 2024-06-18 NOTE — TELEPHONE ENCOUNTER
6-18-24    Ms. Chapa did not log into the virtual telehealth platform link for today's scheduled telehealth session. Two phone attempts made, voice messages left for client.      Kera Landry, [facility]  Mental Health Counselor

## 2024-06-21 ENCOUNTER — TELEPHONE (OUTPATIENT)
Dept: IMMUNOLOGY | Facility: CLINIC | Age: 44
End: 2024-06-21
Payer: COMMERCIAL

## 2024-06-21 NOTE — TELEPHONE ENCOUNTER
6-21-24    Counseling appointment confirmation voice mail received from Ms. Eduard for a telehealth session time on 6-24-24.    Kera Landry, [facility]  Mental Health Counselor       (1) obesity (BMI greater than 25)

## 2024-06-21 NOTE — TELEPHONE ENCOUNTER
6-21-24    Voice mail received from Ms. Chapa.  Return voice mail left for client. Provided a new session time on 6-24-24.    Kera Landry, [facility]  Mental Health Counselor

## 2024-06-24 ENCOUNTER — SOCIAL WORK (OUTPATIENT)
Dept: IMMUNOLOGY | Facility: CLINIC | Age: 44
End: 2024-06-24
Payer: COMMERCIAL

## 2024-06-24 NOTE — PROGRESS NOTES
[facility]     Mental Health Counseling Session Update - Protected Health Information           Date:   6-24-24           Telehealth phone session with Ms. Chapa.                  Total  Time:   60 min                                       Progress:  Good           /  Fair                                                                                                                       Compliant with Psych. Meds:  Yes       Therapeutic Modality:  Supportive                          DBT                                                   Factors regarding Medical treatment:   Multiple medical/ physical concerns/ limitations.                                                                                    Changes (if any) to Initial Assessment:                                                                             Treatment Modality:  Individual    Suicidal/ Homicidal Concerns:  No     Factors aiding or impeding psychotherapy progress:   came     Processed client's thoughts, feelings, and actions.  Focused on distress tolerance, interpersonal effectiveness, and emotion regulation.  Explored ideas, choices, plans, awareness, acceptance, and problem-solving.  Examined perceptions, assumptions, expectations, and realistic outcomes.  Talked about DBT tools that she's implementing daily.  Reflected on client's life experiences and progress.  Provided support and empathetic understanding.                                                       Next Session:    7-2-24     Kera Landry [facility]  Mental Health Counselor

## 2024-06-27 ENCOUNTER — APPOINTMENT (OUTPATIENT)
Dept: BEHAVIORAL HEALTH | Facility: CLINIC | Age: 44
End: 2024-06-27
Payer: COMMERCIAL

## 2024-07-02 ENCOUNTER — TELEPHONE (OUTPATIENT)
Dept: IMMUNOLOGY | Facility: CLINIC | Age: 44
End: 2024-07-02
Payer: COMMERCIAL

## 2024-07-02 NOTE — TELEPHONE ENCOUNTER
7-2-24    Ms. Chapa did not log into the virtual telehealth platform link for today's scheduled telehealth session. Called and spoke with client. She cancelled today's scheduled session and rescheduled it for 7-3-24.    Kera Landry, [facility]  Mental Health Counselor

## 2024-07-03 ENCOUNTER — SOCIAL WORK (OUTPATIENT)
Dept: IMMUNOLOGY | Facility: CLINIC | Age: 44
End: 2024-07-03
Payer: COMMERCIAL

## 2024-07-03 NOTE — PROGRESS NOTES
[facility]     Mental Health Counseling Session Update - Protected Health Information           Date:   7-3-24           Telehealth virtual session with Ms. Chapa.                  Total  Time:   60 min                                       Progress:  Good           /  Fair                                                                                                                       Compliant with Psych. Meds:  Yes       Therapeutic Modality:  Supportive                          DBT                                                   Factors regarding Medical treatment:   Multiple medical/ physical concerns/ limitations.                                                                                    Changes (if any) to Initial Assessment:                                                                             Treatment Modality:  Individual    Suicidal/ Homicidal Concerns:  No     Factors aiding or impeding psychotherapy progress:   came     Processed client's thoughts, feelings, and actions.  Focused on distress tolerance, interpersonal effectiveness, and emotion regulation.  Explored ideas, choices, plans, awareness, acceptance, and problem-solving.  Examined perceptions, assumptions, expectations, and realistic outcomes.  Talked about DBT tools that she's implementing daily.  Reflected on client's life experiences and progress.  Provided support and empathetic understanding.                                                       Next Session:    7-10-24     Kera Landry [facility]  Mental Health Counselor

## 2024-07-10 ENCOUNTER — SOCIAL WORK (OUTPATIENT)
Dept: IMMUNOLOGY | Facility: CLINIC | Age: 44
End: 2024-07-10
Payer: COMMERCIAL

## 2024-07-10 NOTE — PROGRESS NOTES
[facility]     Mental Health Counseling Session Update - Protected Health Information           Date:   7-10-24           Telehealth virtual session with Ms. Chapa.                  Total  Time:   45 min                                       Progress:  Good           /  Fair                                                                                                                       Compliant with Psych. Meds:  Yes       Therapeutic Modality:  Supportive                          DBT                                                   Factors regarding Medical treatment:   Multiple medical/ physical concerns/ limitations.                                                                                    Changes (if any) to Initial Assessment:                                                                             Treatment Modality:  Individual    Suicidal/ Homicidal Concerns:  No     Factors aiding or impeding psychotherapy progress:   came     Processed client's thoughts, feelings, and actions.  Focused on distress tolerance and emotion regulation.  Explored ideas, choices, plans, awareness, acceptance, and problem-solving.  Examined perceptions, assumptions, expectations, and realistic outcomes.  Discussed how difficult it is to change/ interrupt long standing survival skills/ patterns.  Talked about DBT tools that she's implementing daily.  Reflected on client's life experiences and progress.  Provided support and empathetic understanding.                                                       Next Session:    7-15-24     Kera Landry [facility]  Mental Health Counselor

## 2024-07-11 ENCOUNTER — APPOINTMENT (OUTPATIENT)
Dept: BEHAVIORAL HEALTH | Facility: CLINIC | Age: 44
End: 2024-07-11
Payer: COMMERCIAL

## 2024-07-11 DIAGNOSIS — F51.04 PSYCHOPHYSIOLOGICAL INSOMNIA: ICD-10-CM

## 2024-07-11 DIAGNOSIS — F41.1 GAD (GENERALIZED ANXIETY DISORDER): ICD-10-CM

## 2024-07-11 DIAGNOSIS — F31.81 BIPOLAR 2 DISORDER (MULTI): ICD-10-CM

## 2024-07-11 DIAGNOSIS — F42.8 OBSESSIVE THINKING: ICD-10-CM

## 2024-07-11 DIAGNOSIS — F43.10 PTSD (POST-TRAUMATIC STRESS DISORDER): ICD-10-CM

## 2024-07-11 PROCEDURE — 99214 OFFICE O/P EST MOD 30 MIN: CPT | Performed by: NURSE PRACTITIONER

## 2024-07-11 ASSESSMENT — ANXIETY QUESTIONNAIRES
IF YOU CHECKED OFF ANY PROBLEMS ON THIS QUESTIONNAIRE, HOW DIFFICULT HAVE THESE PROBLEMS MADE IT FOR YOU TO DO YOUR WORK, TAKE CARE OF THINGS AT HOME, OR GET ALONG WITH OTHER PEOPLE: SOMEWHAT DIFFICULT
1. FEELING NERVOUS, ANXIOUS, OR ON EDGE: NOT AT ALL
GAD7 TOTAL SCORE: 8
2. NOT BEING ABLE TO STOP OR CONTROL WORRYING: NEARLY EVERY DAY
5. BEING SO RESTLESS THAT IT IS HARD TO SIT STILL: NOT AT ALL
3. WORRYING TOO MUCH ABOUT DIFFERENT THINGS: NEARLY EVERY DAY
7. FEELING AFRAID AS IF SOMETHING AWFUL MIGHT HAPPEN: SEVERAL DAYS
6. BECOMING EASILY ANNOYED OR IRRITABLE: SEVERAL DAYS
4. TROUBLE RELAXING: NOT AT ALL

## 2024-07-11 ASSESSMENT — PATIENT HEALTH QUESTIONNAIRE - PHQ9
7. TROUBLE CONCENTRATING ON THINGS, SUCH AS READING THE NEWSPAPER OR WATCHING TELEVISION: NEARLY EVERY DAY
1. LITTLE INTEREST OR PLEASURE IN DOING THINGS: MORE THAN HALF THE DAYS
3. TROUBLE FALLING OR STAYING ASLEEP OR SLEEPING TOO MUCH: SEVERAL DAYS
10. IF YOU CHECKED OFF ANY PROBLEMS, HOW DIFFICULT HAVE THESE PROBLEMS MADE IT FOR YOU TO DO YOUR WORK, TAKE CARE OF THINGS AT HOME, OR GET ALONG WITH OTHER PEOPLE: SOMEWHAT DIFFICULT
9. THOUGHTS THAT YOU WOULD BE BETTER OFF DEAD, OR OF HURTING YOURSELF: NOT AT ALL
6. FEELING BAD ABOUT YOURSELF - OR THAT YOU ARE A FAILURE OR HAVE LET YOURSELF OR YOUR FAMILY DOWN: NEARLY EVERY DAY
4. FEELING TIRED OR HAVING LITTLE ENERGY: NEARLY EVERY DAY
8. MOVING OR SPEAKING SO SLOWLY THAT OTHER PEOPLE COULD HAVE NOTICED. OR THE OPPOSITE, BEING SO FIGETY OR RESTLESS THAT YOU HAVE BEEN MOVING AROUND A LOT MORE THAN USUAL: NOT AT ALL
2. FEELING DOWN, DEPRESSED OR HOPELESS: SEVERAL DAYS
5. POOR APPETITE OR OVEREATING: MORE THAN HALF THE DAYS

## 2024-07-11 ASSESSMENT — ENCOUNTER SYMPTOMS
PHOTOPHOBIA: 1
NERVOUS/ANXIOUS: 1
CONSTITUTIONAL NEGATIVE: 1

## 2024-07-11 NOTE — PROGRESS NOTES
"Adult Ambulatory Psychiatry Progress Note      Assessment/Plan     Impression:  Cherrie Chapa is a 44 y.o. female domiciled  with 2 children, on disability, who presents for follow up with CC of \"I've been ok. Norman (her son) and I went on a camping trip with [place] friends and had a nice relaxing time and ended up going to [place] for the day. Then I had to drive my mom who was in [place], [place] to visit a friend of hers of 40 years, over 2 one day trips and it was a lot. I ended up with a swollen knee so I may need injections now. I am in pain and limping but I am managing. Otherwise as my daughter moved out to be with her bf, it's just Norman and I and we have our routine at home and doing fine.\"    Plan: Patient was cooperative and engaging. Reports feeling more stable and able to manage herself emotionally - going to the gym, making effort to do more self-care. Patient inquired into refilling Valium but it was explained to patient that as her OARRS check showed her THC refills are ongoing with most recent one having been 07/09, the Valium cannot be done, as the condition of it being refilled is no more THC use. Patient acknowledged understanding this requirement. Reviewed and agreed that no changes to medications or treatment plan are needed at this time. Continues to see her therapist, Ms. Landry weekly.    Medication: Rexulti 4mg at bedtime, Zoloft 150mg every day. Atarax 25-50mg as needed for anxiety spikes and for additional sleep aid. Valium discontinued due to THC still being picked up. .    Reviewed r/b/a, possible side effects of the medication. Client is aware about the benefit outweighs the risk.     Diagnoses and all orders for this visit:  TIM (generalized anxiety disorder)  Bipolar 2 disorder (Multi)  Obsessive thinking  PTSD (post-traumatic stress disorder)  Psychophysiological insomnia      Therapy: none    Other: n/a    Patient is reminded that if there is SI to call [phone] and get " "themselves to the closest ED for evaluation, otherwise contact me for other questions/concerns.     Subjective   HPI:  \"Both the patient, and family and caregivers and guardians as appropriate, were informed of the current need to conduct treatment via telephone. I have confirmed the patient's identity via the following (minimum of three) acceptable identifiers as per  Policy PH-9: , [place] home address.\"     Virtual or Telephone Consent    An interactive audio and video telecommunication system which permits real time communications between the patient (at the originating site) and provider (at the distant site) was utilized to provide this telehealth service.   Verbal consent was requested and obtained from Cherrie Chapa on this date, 24 for a telehealth visit.     Present Illness - anxiety     Characteristics/Recent psychiatric symptoms (pertinent positives and negatives) - reports overall mood is stable but does miss her daughter, Cari having moved out and 'it is an adjustment but she does come around several times a week to visit.' Reports having joined a local gym and does do water aerobics classes and had been doing evening [employer] classes but then her knee swelling recently has kept her from going to the gym, but feels she could still do the water aerobics as it has little impact on her joints and will resume the class next week. Reports the issues related to her lease and the landlord of the apartment building has 'gone quiet. We signed everything and she has left me alone. It looks like she maybe on the way out of here. However I saw on the [facility] website that the old [facility] building is being renovated and turned into community housing/apartments and I was told I was selected to be one of the first tenets. I filled out the paperwork and still waiting to hear about when the move date, but I had driven past and it is already under renovations, the parking lot is done. There will be " elevators and it will be brand spanking new. I am so relieved and excited for this.' Reports had a recent session with her therapist, Megan Landry in person, and had a good time, but had to go back to virtual d/t the office is several flights upstairs and her knee was hurting her, but also they had to stretch out appts d/t each of them having taken vacations. Reports making changes in her life 'with pacing myself so I don't stress myself, and not bring my mood down or overwhelm myself and feel like I have so many things to do that I can get manic or overly anxious.' Reports telling her son that they will just pace themselves so she can stay calm and relaxed. Reports her daughter took the cat 'and I didn't realize how very much I loved and was attached to the cat, so Cari and her bf got me a little dog, a Frenchie and he's adorable. He helps me not be so sad or anxious. He just lays on my chest and I can coddle him and cuddle. I know that I have to be ready with the move and I want to make sure that I can take him and have the reasonable accommodations form printed out already but if that doesn't work out, I am not going to screw up my chances of moving and she knows that they will have to take the puppy.' Reports her son is dealing with his own social anxiety and can't leave the home and at age 15 is fearful of being out in public and around people, so hasn't gotten a summer job. Wonders if he has more of a sensory issue and not just anxiety as he has a 'stim thing he does when he feels overwhelmed.' Reports she can feel lonely at times but having her son and now the puppy around are helping. Energy levels are 'not bad' but does have lately been experiencing some mental/physical fatigue. Reports sleep quality remains 'about the same. Insomnia has been improved. I am getting about 6-7 hrs and with the puppy being 5 months old, we will stay up until 11pm and then we are tired and he sleeps through the night. I  "made a little station for him to pee on so we don't have to take him out at night if he has to pee. So we are getting about 7 hours. It feels good.' Her son is good with walking the dog once a day and that keeps her son occupied himself. Reports her appetite 'has been ok. I am making effort to eat small things, especially breakfast several times a day, so I would say it has been good. I am trying to watch my weight.' Reports noticing only occasional racing, and obsessive thoughts situationally when feeling overwhelmed but as the main stressor in her life (manager/landlord) is quiet and anticipates herself moving into the new building, those thoughts are so much quieter. Reports having discussed with her therapist of not needing to do as much DBT. Admits not noticing as being so easily angered or irritable and \"I catch myself when I feel like I am about to have an outburst. I also have cut back on the caffeine as that really hits me wrong. I am just drinking non-caffeinated coffee.\"     Onset/timeframe - 1 month  Type - anxiety, depression  Duration - situational  Aggravating and/or relieving factors/triggers - feels medications are helping, but can still find herself at times in certain situations that overwhelm her and bring her mood down, but more so impacting her mood, and less so her anxiety.  Treatment and treatment changes (new meds, dosage increases or decreases, med compliance, therapy frequency, etc.) (Past and Recent) - She sees Megan Landry, her therapist twice a week with [facility] - DCBT and psychodynamic therapy. Rexulti 4mg @[facility], Zoloft 150mg QD, Diazepam 5mg 1 tablet PRN daily (on hold), Atarax 25-50mg PRN. Topamax 75mg BID (managed by pain management doctor) for neck pain     Issues: Denies SI/HI/AVH.           Review of Systems   Constitutional: Negative.    HENT: Negative.     Eyes:  Positive for photophobia.        Just came from eye exam with drop for dilation so light " sensitivity is noted   Allergic/Immunologic: Positive for immunocompromised state.   Psychiatric/Behavioral:  Positive for dysphoric mood. The patient is nervous/anxious.        OARRS:  Moody Peoples, APRN-CNP on 7/11/2024 12:16 PM  I have personally reviewed the OARRS report for Cherrie Chapa. I have considered the risks of abuse, dependence, addiction and diversion    Is the patient prescribed a combination of a benzodiazepine and opioid?  No    Last Urine Drug Screen / ordered today: Yes  Recent Results (from the past 8760 hour(s))   Benzodiazepine Confirmation, Urine    Collection Time: 04/15/24  4:46 PM   Result Value Ref Range    Clonazepam <25 <25 ng/mL    7-Aminoclonazepam <25 <25 ng/mL    Alprazolam <25 <25 ng/mL    Alpha-Hydroxyalprazolam <25 <25 ng/mL    Midazolam <25 <25 ng/mL    Alpha-Hydroxymidazolam <25 <25 ng/mL    Chlordiazepoxide <25 <25 ng/mL    Diazepam <25 <25 ng/mL    Nordiazepam 145 (H) <25 ng/mL    Temazepam 472 (H) <25 ng/mL    Oxazepam 274 (H) <25 ng/mL    Lorazepam <25 <25 ng/mL   Drug Screen, Urine With Reflex to Confirmation    Collection Time: 04/15/24  4:46 PM   Result Value Ref Range    Amphetamine Screen, Urine Presumptive Negative Presumptive Negative    Barbiturate Screen, Urine Presumptive Negative Presumptive Negative    Benzodiazepines Screen, Urine Presumptive Positive (A) Presumptive Negative    Cannabinoid Screen, Urine Presumptive Negative Presumptive Negative    Cocaine Metabolite Screen, Urine Presumptive Negative Presumptive Negative    Fentanyl Screen, Urine Presumptive Negative Presumptive Negative    Opiate Screen, Urine Presumptive Negative Presumptive Negative    Oxycodone Screen, Urine Presumptive Negative Presumptive Negative    PCP Screen, Urine Presumptive Negative Presumptive Negative    Methadone Screen, Urine Presumptive Negative Presumptive Negative     N/A    Clinical rationale for not completing a Urine Drug Screen: Restarting orders for drug screen put  in place.      Controlled Substance Agreement:  Date of the Last Agreement: 03/23/2024    Objective   Mental Status Exam:  General Appearance: Fairly groomed  Attitude/Behavior: Cooperative  Motor: No psychomotor agitation or retardation, no tremor or other abnormal movements  Speech: Normal rate, volume, prosody  Gait/Station: Other:(comment) (sitting on couch at home over virtual connection)  Mood: 'ok, slowly getting better'  Affect: Constricted, Anxious, Congruent with mood and topic of conversation  Thought Process: Linear, goal directed, Perseverative (racing)  Thought Associations: No loosening of associations  Thought Content: Normal, Other: (comment) (finds herself can still get overwhelmed with different situations in her life that can bring her mood down, but is managing herself better in general, especially her anxiety.)  Perception: No perceptual abnormalities noted  Sensorium: Alert and oriented to person, place, time and situation  Insight: Fair  Judgement: Intact  Cognition: Cognitively intact to conversational testing with respect to attention, orientation, fund of knowledge, recent and remote memory, and language  Testing: N/A    TIM-7/PHQ-9 scores reviewed: 8, 15 compared to 13, 13 reflecting improvement with anxiety but a mild jump in  depression/mood.    Current Medications:  Current Outpatient Medications on File Prior to Visit   Medication Sig Dispense Refill    acetaminophen (Tylenol) 325 mg tablet Take 2 tablets (650 mg) by mouth every 6 hours.      albuterol (Ventolin HFA) 90 mcg/actuation inhaler Inhale 2 puffs every 6 hours if needed for wheezing. 18 g 1    voswmdzau-hjktyryu-tbwnrxg ala (Biktarvy) -25 mg tablet take 1 tablet by mouth once daily 30 tablet 1    brexpiprazole 4 mg tablet Take 4 mg by mouth once daily. 60 tablet 11    cyclobenzaprine (Flexeril) 5 mg tablet Take 1 tablet (5 mg) by mouth 3 times a day as needed for muscle spasms. 30 tablet 2    ergocalciferol (Vitamin  D-2) 1.25 MG (60049 UT) capsule Take 1 capsule (1,250 mcg) by mouth 1 (one) time per week.      hydrOXYzine HCL (Atarax) 25 mg tablet Take 1 tablet (25 mg) by mouth once daily as needed for anxiety. 90 tablet 1    ibuprofen 600 mg tablet Take 1 tablet (600 mg) by mouth 3 times a day with meals. 90 tablet 2    lidocaine (Lidoderm) 5 % patch Place 1 patch on the skin every 12 hours. (Leave on for 12 hours, then remove and leave off for 12 hours)      mv-min-FA-vit K-lutein-zeaxant (PreserVision AREDS 2 Plus MV) 200 mcg-15 mcg- 5 mg-1 mg capsule Take 1 capsule by mouth 2 times a day.      naproxen (Naprosyn) 500 mg tablet Take 1 tablet (500 mg) by mouth 2 times daily (morning and late afternoon).      polyethylene glycol (Glycolax, Miralax) 17 gram/dose powder Take 17 g by mouth. 17 gram/dose powder      sertraline (Zoloft) 100 mg tablet Take 1 tablet (100 mg) by mouth once daily. 1 and .5 tablets daily 90 tablet 3    topiramate (Topamax) 25 mg tablet Take 1 tablet (25 mg) by mouth. Take per titration, fax to pharmacy       No current facility-administered medications on file prior to visit.       Lab Review:   Lab on 04/15/2024   Component Date Value    WBC 04/15/2024 9.1     nRBC 04/15/2024 0.0     RBC 04/15/2024 4.20     Hemoglobin 04/15/2024 13.7     Hematocrit 04/15/2024 40.2     MCV 04/15/2024 96     MCH 04/15/2024 32.6     MCHC 04/15/2024 34.1     RDW 04/15/2024 12.2     Platelets 04/15/2024 377     Neutrophils % 04/15/2024 48.9     Immature Granulocytes %,* 04/15/2024 0.3     Lymphocytes % 04/15/2024 41.6     Monocytes % 04/15/2024 6.9     Eosinophils % 04/15/2024 2.0     Basophils % 04/15/2024 0.3     Neutrophils Absolute 04/15/2024 4.44     Immature Granulocytes Ab* 04/15/2024 0.03     Lymphocytes Absolute 04/15/2024 3.78     Monocytes Absolute 04/15/2024 0.63     Eosinophils Absolute 04/15/2024 0.18     Basophils Absolute 04/15/2024 0.03     Chlamydia trachomatis, A* 04/15/2024 Negative     Glucose  04/15/2024 112 (H)     Sodium 04/15/2024 139     Potassium 04/15/2024 3.8     Chloride 04/15/2024 105     Bicarbonate 04/15/2024 25     Anion Gap 04/15/2024 13     Urea Nitrogen 04/15/2024 9     Creatinine 04/15/2024 0.78     eGFR 04/15/2024 >90     Calcium 04/15/2024 9.7     Albumin 04/15/2024 4.2     Alkaline Phosphatase 04/15/2024 65     Total Protein 04/15/2024 6.7     AST 04/15/2024 15     Bilirubin, Total 04/15/2024 0.5     ALT 04/15/2024 16     Hemoglobin A1C 04/15/2024 5.3     Estimated Average Glucose 04/15/2024 105     Cholesterol 04/15/2024 228 (H)     HDL-Cholesterol 04/15/2024 36.1     Cholesterol/HDL Ratio 04/15/2024 6.3     LDL Calculated 04/15/2024      VLDL 04/15/2024      Triglycerides 04/15/2024 409 (H)     Non HDL Cholesterol 04/15/2024 192 (H)     Syphilis Total Ab 04/15/2024 Nonreactive     Trichomonas Vaginalis 04/15/2024 Negative     Amphetamine Screen, Urine 04/15/2024 Presumptive Negative     Barbiturate Screen, Urine 04/15/2024 Presumptive Negative     Benzodiazepines Screen, * 04/15/2024 Presumptive Positive (A)     Cannabinoid Screen, Urine 04/15/2024 Presumptive Negative     Cocaine Metabolite Scree* 04/15/2024 Presumptive Negative     Fentanyl Screen, Urine 04/15/2024 Presumptive Negative     Opiate Screen, Urine 04/15/2024 Presumptive Negative     Oxycodone Screen, Urine 04/15/2024 Presumptive Negative     PCP Screen, Urine 04/15/2024 Presumptive Negative     Methadone Screen, Urine 04/15/2024 Presumptive Negative     Lymphocyte Absolute 04/15/2024 4.09     CD3+CD4+% 04/15/2024 47     CD3+CD4+ Absolute 04/15/2024 1.922     CD3+CD8+% 04/15/2024 37 (H)     CD3+CD8+ Absolute 04/15/2024 1.513 (H)     CD4/CD8 Ratio 04/15/2024 1.27     CD3+CD4+ Absolute (/mm3) 04/15/2024 1,922     WBC 04/15/2024 9.3     nRBC 04/15/2024 0.0     RBC 04/15/2024 4.15     Hemoglobin 04/15/2024 13.7     Hematocrit 04/15/2024 39.6     MCV 04/15/2024 95     MCH 04/15/2024 33.0     MCHC 04/15/2024 34.6     RDW  04/15/2024 12.1     Platelets 04/15/2024 370     Neutrophils % 04/15/2024 46.5     Immature Granulocytes %,* 04/15/2024 0.3     Lymphocytes % 04/15/2024 43.9     Monocytes % 04/15/2024 7.2     Eosinophils % 04/15/2024 1.8     Basophils % 04/15/2024 0.3     Neutrophils Absolute 04/15/2024 4.33     Immature Granulocytes Ab* 04/15/2024 0.03     Lymphocytes Absolute 04/15/2024 4.09     Monocytes Absolute 04/15/2024 0.67     Eosinophils Absolute 04/15/2024 0.17     Basophils Absolute 04/15/2024 0.03     Clonazepam 04/15/2024 <25     7-Aminoclonazepam 04/15/2024 <25     Alprazolam 04/15/2024 <25     Alpha-Hydroxyalprazolam 04/15/2024 <25     Midazolam 04/15/2024 <25     Alpha-Hydroxymidazolam 04/15/2024 <25     Chlordiazepoxide 04/15/2024 <25     Diazepam 04/15/2024 <25     Nordiazepam 04/15/2024 145 (H)     Temazepam 04/15/2024 472 (H)     Oxazepam 04/15/2024 274 (H)     Lorazepam 04/15/2024 <25          Time Spent:    Prep time: 1 min.  Direct patient time: 30 min.  Documentation time: 7 min.  Total time: 38 min.    Next Appointment:  Follow up in about 2 months (around 9/11/2024).

## 2024-07-14 PROBLEM — F41.9 ANXIETY: Status: RESOLVED | Noted: 2023-08-15 | Resolved: 2024-07-14

## 2024-07-14 ASSESSMENT — ENCOUNTER SYMPTOMS: DYSPHORIC MOOD: 1

## 2024-07-15 ENCOUNTER — TELEPHONE (OUTPATIENT)
Dept: IMMUNOLOGY | Facility: CLINIC | Age: 44
End: 2024-07-15
Payer: COMMERCIAL

## 2024-07-15 NOTE — TELEPHONE ENCOUNTER
7-15-24    Ms. Chapa did not log into the virtual telehealth platform link for today's scheduled telehealth session. Two phone attempts made, voice messages left for client.      Kera Landry, [facility]  Mental Health Counselor

## 2024-07-22 ENCOUNTER — TELEPHONE (OUTPATIENT)
Dept: IMMUNOLOGY | Facility: CLINIC | Age: 44
End: 2024-07-22
Payer: COMMERCIAL

## 2024-07-22 NOTE — TELEPHONE ENCOUNTER
7-22-24    Voice mail received from Ms. Chapa. Attempted to call client twice and each time reached a recording stating that she was unavailable. There was no voice mailbox access. Unable to leave a message.      Kera Landry, [facility]  Mental Health Counselor

## 2024-07-23 ENCOUNTER — TELEPHONE (OUTPATIENT)
Dept: IMMUNOLOGY | Facility: CLINIC | Age: 44
End: 2024-07-23
Payer: COMMERCIAL

## 2024-07-23 NOTE — TELEPHONE ENCOUNTER
CHW called to check in if patient is available to get labs done before appointment on 08/02. Patient is currently has COVID -19 and can get labs done on 31st at 9am depending on how she feels.

## 2024-07-23 NOTE — TELEPHONE ENCOUNTER
7-23-24    Called and spoke with client. Ms. Chapa rescheduled her counseling session for   7-24-24.    Kera Landry, [facility]  Mental Health Counselor

## 2024-07-24 ENCOUNTER — SOCIAL WORK (OUTPATIENT)
Dept: IMMUNOLOGY | Facility: CLINIC | Age: 44
End: 2024-07-24
Payer: COMMERCIAL

## 2024-07-24 NOTE — PROGRESS NOTES
[facility]     Mental Health Counseling Session Update - Protected Health Information           Date:   7-23-24           Telehealth virtual session with Ms. Chapa.                  Total  Time:   60 min                                       Progress:  Good           /  Fair                                                                                                                       Compliant with Psych. Meds:  Yes       Therapeutic Modality:  Supportive                          DBT                                                   Factors regarding Medical treatment:   Multiple medical/ physical concerns/ limitations.                                                                                    Changes (if any) to Initial Assessment:                                                                             Treatment Modality:  Individual    Suicidal/ Homicidal Concerns:  No     Factors aiding or impeding psychotherapy progress:   came     Processed client's thoughts, feelings, and actions.  Focused on distress tolerance and emotion regulation.  Explored ideas, choices, plans, awareness, acceptance, and problem-solving.  Looked at perceptions, assumptions, expectations, and realistic outcomes.  Examined thought, behavioral, emotional, and relationship patterns.  Discussed how difficult it is to change/ interrupt long standing survival skills/ patterns.  Talked about DBT tools that she's implementing daily.  Reflected on client's life experiences and progress.  Provided support and empathetic understanding.                                                       Next Session:    7-31-24     Kera Landry [facility]  Mental Health Counselor

## 2024-07-29 ENCOUNTER — TELEPHONE (OUTPATIENT)
Dept: IMMUNOLOGY | Facility: CLINIC | Age: 44
End: 2024-07-29
Payer: COMMERCIAL

## 2024-07-29 NOTE — TELEPHONE ENCOUNTER
7-29-24                                      15 min    Voice mail received from Ms. Chapa.  Returned her call and spoke with client.    Explored ideas, choices, and problem-solving.    Provided support and empathetic understanding.    Next session:  7-31-24.    Kera Landry, [facility]  Mental Health Counselor

## 2024-07-31 ENCOUNTER — TELEPHONE (OUTPATIENT)
Dept: IMMUNOLOGY | Facility: CLINIC | Age: 44
End: 2024-07-31
Payer: COMMERCIAL

## 2024-07-31 ENCOUNTER — DOCUMENTATION (OUTPATIENT)
Dept: IMMUNOLOGY | Facility: CLINIC | Age: 44
End: 2024-07-31
Payer: COMMERCIAL

## 2024-07-31 ENCOUNTER — SOCIAL WORK (OUTPATIENT)
Dept: IMMUNOLOGY | Facility: CLINIC | Age: 44
End: 2024-07-31
Payer: COMMERCIAL

## 2024-07-31 NOTE — PROGRESS NOTES
[facility]     Mental Health Counseling Session Update - Protected Health Information           Date:   7-31-24           Telehealth virtual session with Ms. Chapa.                  Total  Time:   60 min                                       Progress:  Good           /  Fair                                                                                                                       Compliant with Psych. Meds:  Yes       Therapeutic Modality:  Supportive                          DBT                                                   Factors regarding Medical treatment:   Multiple medical/ physical concerns/ limitations.                                                                                    Changes (if any) to Initial Assessment:                                                                             Treatment Modality:  Individual    Suicidal/ Homicidal Concerns:  No     Factors aiding or impeding psychotherapy progress:   came     Processed client's thoughts, feelings, and actions.  Focused on distress tolerance and emotion regulation.  Explored ideas, choices, plans, awareness, acceptance, and problem-solving.  Looked at perceptions, assumptions, expectations, and realistic outcomes.  Examined thought, behavioral, emotional, and relationship patterns.  Discussed how difficult it is to change/ interrupt long standing survival skills/ patterns.  Talked about DBT tools that she's implementing daily.  Reflected on client's life experiences and progress.  Provided support and empathetic understanding.                                                       Next Session:    8-7-24     Kera Landry [facility]  Mental Health Counselor

## 2024-07-31 NOTE — PROGRESS NOTES
Called patient to remind her of her appointment with Dr. Yu.  Patient is uable to come in for lab work prior to the appointment.  Complains of fungal infection underneath breasts.  Very uncomfortable.  Nysatin powder script to be sent to pharmacy.  Will schedule patient to be seen by GYN.

## 2024-07-31 NOTE — TELEPHONE ENCOUNTER
Pt's call was transferred to CHARLEEN. Pt has an appt with Dr. Yu on Friday. Pt receives SSDI and was worried about not having enough gas to come to [facility] for lab and MD visit separately. CHARLEEN Informed pt that gas card could be provided when pt was eligible, but it is subject to availability. Pt verbalized understanding.    Pt stated that she is at a review stage with SSDI, regarding how many hours she could work and still receive SSDI. CHARLEEN informed pt that [facility] does not help with SS, but encouraged pt to contact  and see if there is any assistance.

## 2024-08-01 DIAGNOSIS — B20 HIV INFECTION, UNSPECIFIED SYMPTOM STATUS (MULTI): ICD-10-CM

## 2024-08-01 DIAGNOSIS — B20 HIV INFECTION, UNSPECIFIED SYMPTOM STATUS (MULTI): Primary | ICD-10-CM

## 2024-08-01 PROCEDURE — RXMED WILLOW AMBULATORY MEDICATION CHARGE

## 2024-08-01 RX ORDER — NYSTATIN 100000 [USP'U]/G
1 POWDER TOPICAL 2 TIMES DAILY
Qty: 60 G | Refills: 1 | Status: SHIPPED | OUTPATIENT
Start: 2024-08-01 | End: 2024-08-01 | Stop reason: SDUPTHER

## 2024-08-01 RX ORDER — NYSTATIN 100000 [USP'U]/G
1 POWDER TOPICAL 2 TIMES DAILY
Qty: 60 G | Refills: 1 | Status: SHIPPED | OUTPATIENT
Start: 2024-08-01

## 2024-08-02 ENCOUNTER — PHARMACY VISIT (OUTPATIENT)
Dept: PHARMACY | Facility: CLINIC | Age: 44
End: 2024-08-02
Payer: MEDICAID

## 2024-08-02 ENCOUNTER — OFFICE VISIT (OUTPATIENT)
Dept: IMMUNOLOGY | Facility: CLINIC | Age: 44
End: 2024-08-02
Payer: COMMERCIAL

## 2024-08-02 VITALS
BODY MASS INDEX: 34.01 KG/M2 | HEART RATE: 60 BPM | DIASTOLIC BLOOD PRESSURE: 67 MMHG | WEIGHT: 192 LBS | SYSTOLIC BLOOD PRESSURE: 104 MMHG | OXYGEN SATURATION: 100 %

## 2024-08-02 DIAGNOSIS — Z11.3 ROUTINE SCREENING FOR STI (SEXUALLY TRANSMITTED INFECTION): ICD-10-CM

## 2024-08-02 DIAGNOSIS — Z59.41 FOOD INSECURITY: ICD-10-CM

## 2024-08-02 DIAGNOSIS — Z79.899 HIGH RISK MEDICATION USE: ICD-10-CM

## 2024-08-02 DIAGNOSIS — E66.9 OBESITY (BMI 30-39.9): ICD-10-CM

## 2024-08-02 DIAGNOSIS — B20 HUMAN IMMUNODEFICIENCY VIRUS (MULTI): Primary | ICD-10-CM

## 2024-08-02 DIAGNOSIS — B20 HIV INFECTION, UNSPECIFIED SYMPTOM STATUS (MULTI): ICD-10-CM

## 2024-08-02 DIAGNOSIS — Z12.31 SCREENING MAMMOGRAM FOR BREAST CANCER: ICD-10-CM

## 2024-08-02 DIAGNOSIS — Z13.1 ENCOUNTER FOR SCREENING FOR DIABETES MELLITUS: ICD-10-CM

## 2024-08-02 DIAGNOSIS — E78.5 HYPERLIPIDEMIA, UNSPECIFIED HYPERLIPIDEMIA TYPE: ICD-10-CM

## 2024-08-02 LAB
ALBUMIN SERPL BCP-MCNC: 4.1 G/DL (ref 3.4–5)
ALP SERPL-CCNC: 73 U/L (ref 33–110)
ALT SERPL W P-5'-P-CCNC: 18 U/L (ref 7–45)
ANION GAP SERPL CALC-SCNC: 14 MMOL/L (ref 10–20)
AST SERPL W P-5'-P-CCNC: 18 U/L (ref 9–39)
BASOPHILS # BLD AUTO: 0.05 X10*3/UL (ref 0–0.1)
BASOPHILS NFR BLD AUTO: 0.6 %
BILIRUB SERPL-MCNC: 0.5 MG/DL (ref 0–1.2)
BUN SERPL-MCNC: 11 MG/DL (ref 6–23)
CALCIUM SERPL-MCNC: 9.6 MG/DL (ref 8.6–10.6)
CHLORIDE SERPL-SCNC: 104 MMOL/L (ref 98–107)
CHOLEST SERPL-MCNC: 224 MG/DL (ref 0–199)
CHOLESTEROL/HDL RATIO: 6.1
CO2 SERPL-SCNC: 27 MMOL/L (ref 21–32)
CREAT SERPL-MCNC: 0.85 MG/DL (ref 0.5–1.05)
EGFRCR SERPLBLD CKD-EPI 2021: 86 ML/MIN/1.73M*2
EOSINOPHIL # BLD AUTO: 0.22 X10*3/UL (ref 0–0.7)
EOSINOPHIL NFR BLD AUTO: 2.6 %
ERYTHROCYTE [DISTWIDTH] IN BLOOD BY AUTOMATED COUNT: 11.9 % (ref 11.5–14.5)
EST. AVERAGE GLUCOSE BLD GHB EST-MCNC: 100 MG/DL
GLUCOSE SERPL-MCNC: 76 MG/DL (ref 74–99)
HBA1C MFR BLD: 5.1 %
HCT VFR BLD AUTO: 41.4 % (ref 36–46)
HDLC SERPL-MCNC: 36.9 MG/DL
HGB BLD-MCNC: 13.8 G/DL (ref 12–16)
IMM GRANULOCYTES # BLD AUTO: 0.01 X10*3/UL (ref 0–0.7)
IMM GRANULOCYTES NFR BLD AUTO: 0.1 % (ref 0–0.9)
LDLC SERPL CALC-MCNC: 124 MG/DL
LYMPHOCYTES # BLD AUTO: 4.36 X10*3/UL (ref 1.2–4.8)
LYMPHOCYTES NFR BLD AUTO: 52.3 %
MCH RBC QN AUTO: 32.7 PG (ref 26–34)
MCHC RBC AUTO-ENTMCNC: 33.3 G/DL (ref 32–36)
MCV RBC AUTO: 98 FL (ref 80–100)
MONOCYTES # BLD AUTO: 0.58 X10*3/UL (ref 0.1–1)
MONOCYTES NFR BLD AUTO: 7 %
NEUTROPHILS # BLD AUTO: 3.12 X10*3/UL (ref 1.2–7.7)
NEUTROPHILS NFR BLD AUTO: 37.4 %
NON HDL CHOLESTEROL: 187 MG/DL (ref 0–149)
NRBC BLD-RTO: 0 /100 WBCS (ref 0–0)
PLATELET # BLD AUTO: 348 X10*3/UL (ref 150–450)
POTASSIUM SERPL-SCNC: 3.9 MMOL/L (ref 3.5–5.3)
PROT SERPL-MCNC: 6.5 G/DL (ref 6.4–8.2)
RBC # BLD AUTO: 4.22 X10*6/UL (ref 4–5.2)
SODIUM SERPL-SCNC: 141 MMOL/L (ref 136–145)
TREPONEMA PALLIDUM IGG+IGM AB [PRESENCE] IN SERUM OR PLASMA BY IMMUNOASSAY: NONREACTIVE
TRIGL SERPL-MCNC: 317 MG/DL (ref 0–149)
VLDL: 63 MG/DL (ref 0–40)
WBC # BLD AUTO: 8.3 X10*3/UL (ref 4.4–11.3)

## 2024-08-02 PROCEDURE — 86780 TREPONEMA PALLIDUM: CPT | Performed by: INTERNAL MEDICINE

## 2024-08-02 PROCEDURE — 87536 HIV-1 QUANT&REVRSE TRNSCRPJ: CPT | Performed by: INTERNAL MEDICINE

## 2024-08-02 PROCEDURE — 87491 CHLMYD TRACH DNA AMP PROBE: CPT | Performed by: INTERNAL MEDICINE

## 2024-08-02 PROCEDURE — 83036 HEMOGLOBIN GLYCOSYLATED A1C: CPT | Performed by: INTERNAL MEDICINE

## 2024-08-02 PROCEDURE — 80053 COMPREHEN METABOLIC PANEL: CPT | Performed by: INTERNAL MEDICINE

## 2024-08-02 PROCEDURE — 99214 OFFICE O/P EST MOD 30 MIN: CPT | Performed by: INTERNAL MEDICINE

## 2024-08-02 PROCEDURE — 36415 COLL VENOUS BLD VENIPUNCTURE: CPT | Performed by: INTERNAL MEDICINE

## 2024-08-02 PROCEDURE — 88185 FLOWCYTOMETRY/TC ADD-ON: CPT | Performed by: INTERNAL MEDICINE

## 2024-08-02 PROCEDURE — 80061 LIPID PANEL: CPT | Performed by: INTERNAL MEDICINE

## 2024-08-02 PROCEDURE — 85025 COMPLETE CBC W/AUTO DIFF WBC: CPT | Performed by: INTERNAL MEDICINE

## 2024-08-02 SDOH — ECONOMIC STABILITY - FOOD INSECURITY: FOOD INSECURITY: Z59.41

## 2024-08-02 ASSESSMENT — PAIN SCALES - GENERAL: PAINLEVEL: 6

## 2024-08-02 NOTE — PROGRESS NOTES
HIV Clinic Follow-up Visit:    Cherrie Chapa is a 44 y.o. female with HIV on Biktarvy who presents for HIV follow up visit. Patient was last seen in [facility] virtually  January 2024.    Missed antiretroviral doses in last 72 hours? No    Sexually active? no    Tobacco use: No     SUBJECTIVE:   No recent labs, fasting and will get labs today, had covid 2 weeks ago, doing well now.    - Complains of candidal rash under both breasts, following at [facility] and sees pain clinic    - Hyperlipidemia: will get lipid panel, not on statin    - HTN: blood pressure wnl    - Health maintenance: will need mammography and pap smear scheduled    - Sexual History: Currently not sexually active.    - Social History: not working, lives at home with son, doesn't smoke or drink alcohol. Describes mood as ok, no suicidal ideations.     ROS:  Review of Systems:  General: no fever; normal activity; normal sleep; good PO intake  HEENT: no eye drainage or redness; no ear drainage or pain; no rhinorrhea, congestion, sneezing; no sore throat  CV: no chest pain, murmur, or palpitations  Resp: no shortness of breath, cough, or wheezing  GI: no abdominal pain, nausea, vomiting, diarrhea, or constipation  : no dysuria  MSK: no arthralgias or swelling  Derm: Positive : rash under both breasts  Neuro: no headaches; no weakness  Psych: normal behavior            CURRENT MEDICATIONS:    Current Outpatient Medications:     acetaminophen (Tylenol) 325 mg tablet, Take 2 tablets (650 mg) by mouth every 6 hours., Disp: , Rfl:     albuterol (Ventolin HFA) 90 mcg/actuation inhaler, Inhale 2 puffs every 6 hours if needed for wheezing., Disp: 18 g, Rfl: 1    tfpucgqte-vpqqtpdw-pjqgkkb ala (Biktarvy) -25 mg tablet, take 1 tablet by mouth once daily, Disp: 30 tablet, Rfl: 1    brexpiprazole 4 mg tablet, Take 4 mg by mouth once daily., Disp: 60 tablet, Rfl: 11    cyclobenzaprine (Flexeril) 5 mg tablet, Take 1 tablet (5 mg) by mouth 3 times a day as needed for  muscle spasms., Disp: 30 tablet, Rfl: 2    ergocalciferol (Vitamin D-2) 1.25 MG (09874 UT) capsule, Take 1 capsule (1,250 mcg) by mouth 1 (one) time per week., Disp: , Rfl:     hydrOXYzine HCL (Atarax) 25 mg tablet, Take 1 tablet (25 mg) by mouth once daily as needed for anxiety., Disp: 90 tablet, Rfl: 1    ibuprofen 600 mg tablet, Take 1 tablet (600 mg) by mouth 3 times a day with meals., Disp: 90 tablet, Rfl: 2    lidocaine (Lidoderm) 5 % patch, Place 1 patch on the skin every 12 hours. (Leave on for 12 hours, then remove and leave off for 12 hours), Disp: , Rfl:     mv-min-FA-vit K-lutein-zeaxant (PreserVision AREDS 2 Plus MV) 200 mcg-15 mcg- 5 mg-1 mg capsule, Take 1 capsule by mouth 2 times a day., Disp: , Rfl:     naproxen (Naprosyn) 500 mg tablet, Take 1 tablet (500 mg) by mouth 2 times daily (morning and late afternoon)., Disp: , Rfl:     nystatin (Mycostatin) 100,000 unit/gram powder, Apply 1 Application topically 2 times a day., Disp: 60 g, Rfl: 1    sertraline (Zoloft) 100 mg tablet, Take 1 tablet (100 mg) by mouth once daily. 1 and .5 tablets daily, Disp: 90 tablet, Rfl: 3    polyethylene glycol (Glycolax, Miralax) 17 gram/dose powder, Take 17 g by mouth. 17 gram/dose powder, Disp: , Rfl:     topiramate (Topamax) 25 mg tablet, Take 1 tablet (25 mg) by mouth. Take per titration, fax to pharmacy, Disp: , Rfl:     PHYSICAL EXAMINATION:  Visit Vitals  /67   Pulse 60   Wt 87.1 kg (192 lb)   SpO2 100%   BMI 34.01 kg/m²   Smoking Status Former   BSA 1.97 m²       Physical Exam:  General: Well-appearing, no acute distress  HEENT: Mucous membranes are moist.  No conjunctival injection.  CV: Regular rate and rhythm, no murmurs, rubs, or gallops  Lungs: symmetric chest expansion, CTAB, no increased WOB, no wheezes/rhonchi  Abdomen: Soft, nontender, nondistended  Extremities: Warm and well perfused.  No edema  Skin: Candidal rash under both breasts  Neurological: alert, interactive  Lymphadenopathy: No  cervical lymphadenopathy     PERTINENT DATA:  CBC:  WBC   Date Value Ref Range Status   04/15/2024 9.1 4.4 - 11.3 x10*3/uL Final   04/15/2024 9.3 4.4 - 11.3 x10*3/uL Final     nRBC   Date Value Ref Range Status   04/15/2024 0.0 0.0 - 0.0 /100 WBCs Final   04/15/2024 0.0 0.0 - 0.0 /100 WBCs Final     RBC   Date Value Ref Range Status   04/15/2024 4.20 4.00 - 5.20 x10*6/uL Final   04/15/2024 4.15 4.00 - 5.20 x10*6/uL Final     Hemoglobin   Date Value Ref Range Status   04/15/2024 13.7 12.0 - 16.0 g/dL Final   04/15/2024 13.7 12.0 - 16.0 g/dL Final     MCV   Date Value Ref Range Status   04/15/2024 96 80 - 100 fL Final   04/15/2024 95 80 - 100 fL Final     RDW   Date Value Ref Range Status   04/15/2024 12.2 11.5 - 14.5 % Final   04/15/2024 12.1 11.5 - 14.5 % Final       Renal Function Panel:  Glucose   Date Value Ref Range Status   04/15/2024 112 (H) 74 - 99 mg/dL Final     Sodium   Date Value Ref Range Status   04/15/2024 139 136 - 145 mmol/L Final     Potassium   Date Value Ref Range Status   04/15/2024 3.8 3.5 - 5.3 mmol/L Final     Chloride   Date Value Ref Range Status   04/15/2024 105 98 - 107 mmol/L Final     Anion Gap   Date Value Ref Range Status   04/15/2024 13 10 - 20 mmol/L Final     Urea Nitrogen   Date Value Ref Range Status   04/15/2024 9 6 - 23 mg/dL Final     Creatinine   Date Value Ref Range Status   04/15/2024 0.78 0.50 - 1.05 mg/dL Final     eGFR   Date Value Ref Range Status   04/15/2024 >90 >60 mL/min/1.73m*2 Final     Comment:     Calculations of estimated GFR are performed using the 2021 CKD-EPI Study Refit equation without the race variable for the IDMS-Traceable creatinine methods.  https://jasn.johannyjournals.org/content/early/2021/09/22/ASN.3864316814     Calcium   Date Value Ref Range Status   04/15/2024 9.7 8.6 - 10.6 mg/dL Final     Albumin   Date Value Ref Range Status   04/15/2024 4.2 3.4 - 5.0 g/dL Final       Hepatic Panel:  Albumin   Date Value Ref Range Status   04/15/2024 4.2 3.4 -  "5.0 g/dL Final     Bilirubin, Total   Date Value Ref Range Status   04/15/2024 0.5 0.0 - 1.2 mg/dL Final     Alkaline Phosphatase   Date Value Ref Range Status   04/15/2024 65 33 - 110 U/L Final     ALT   Date Value Ref Range Status   04/15/2024 16 7 - 45 U/L Final     Comment:     Patients treated with Sulfasalazine may generate falsely decreased results for ALT.       HIV Viral Load:  No results found for: \"[id number]\", \"HIVRNAPCR\"    CD4 Count:  CD3+CD4+%   Date Value Ref Range Status   04/15/2024 47 29 - 57 % Final     CD3+CD4+ Absolute   Date Value Ref Range Status   04/15/2024 1.922 0.350 - 2.740 x10E9/L Final     CD3+CD8+%   Date Value Ref Range Status   04/15/2024 37 (H) 7 - 31 % Final     CD3+CD8+ Absolute   Date Value Ref Range Status   04/15/2024 1.513 (H) 0.080 - 1.490 x10E9/L Final     CD4/CD8 Ratio   Date Value Ref Range Status   04/15/2024 1.27 1.00 - 2.60 Final     CD45%   Date Value Ref Range Status   09/28/2023 100 % Final       CRCL:  No results found for: \"URINEVOLUME\", \"CREATU\", \"[id number]\", \"CRCLEARANCE\"    Lipid Panel:  HDL-Cholesterol   Date Value Ref Range Status   04/15/2024 36.1 mg/dL Final     Comment:       Age       Very Low   Low     Normal    High    0-19 Y    < 35      < 40     40-45     ----  20-24 Y    ----     < 40      >45      ----        >24 Y      ----     < 40     40-60      >60       Cholesterol/HDL Ratio   Date Value Ref Range Status   04/15/2024 6.3  Final     Comment:       Ref Values  Desirable  < 3.4  High Risk  > 5.0     LDL   Date Value Ref Range Status   09/28/2023 - 0 - 99 mg/dL Final     Comment:     .                           NEAR      BORD      AGE      DESIRABLE  OPTIMAL    HIGH     HIGH     VERY HIGH     0-19 Y     0 - 109     ---    110-129   >/= 130     ----    20-24 Y     0 - 119     ---    120-159   >/= 160     ----      >24 Y     0 -  99   100-129  130-159   160-189     >/=190  .  THE CALCULATION OF LDL AND VLDL ARE INACCURATE  WHEN TRIGLYCERIDES ARE " GREATER THAN 400 MG/DL  OR WHEN THE PATIENT IS NON-FASTING. IF LDL  MEASUREMENT IS NECESSARY CONTACT THE TESTING  LABORATORY FOR AN ALTERNATIVE LDL ASSAY.       VLDL   Date Value Ref Range Status   04/15/2024   Final     Comment:     Unable to calculate VLDL.     Triglycerides   Date Value Ref Range Status   04/15/2024 409 (H) 0 - 149 mg/dL Final     Comment:        Age         Desirable   Borderline High   High     Very High   0 D-90 D    19 - 174         ----         ----        ----  91 D- 9 Y     0 -  74        75 -  99     >/= 100      ----    10-19 Y     0 -  89        90 - 129     >/= 130      ----    20-24 Y     0 - 114       115 - 149     >/= 150      ----         >24 Y     0 - 149       150 - 199    200- 499    >/= 500    Venipuncture immediately after or during the administration of Metamizole may lead to falsely low results. Testing should be performed immediately prior to Metamizole dosing.       HgbA1c:  Hemoglobin A1C   Date Value Ref Range Status   04/15/2024 5.3 see below % Final       The 10-year ASCVD risk score (Marito DK, et al., 2019) is: 2.6%    Values used to calculate the score:      Age: 44 years      Sex: Female      Is Non- : No      Diabetic: Yes      Tobacco smoker: No      Systolic Blood Pressure: 104 mmHg      Is BP treated: No      HDL Cholesterol: 36.1 mg/dL      Total Cholesterol: 228 mg/dL    ASSESSMENT / PLAN:  Assessment:  Cherrie Chapa is a 44 y.o. female with HIV on Biktarvy who presents for HIV follow up visit. Patient was last seen in [facility] virtually  January 2024.    Plan:  - Will get labs today (CBCd, lipid profile, Hba1c, STI testing, HIV PCR and CD4 counts)  - Will schedule mammography and Gyn appointment/pap smear.  - Nystatin prescribed for candidal rash under both breasts  - Follow up in [facility] clinic in 3-6 months or earlier if needed.      Patient seen and staffed with Attending Dr. Aimee Obrien, PGY6  Pediatric Infectious Disease  Fellow    In addition to above, pt complained of urinary incontinence, progressively worsening. Will refer her to Dr Benton in urology  [facility]   ID Pager: [phone]

## 2024-08-02 NOTE — LETTER
08/02/24    Ted Ramon DO  Office Address Unavailable  As Of 07/01/2023      Dear Dr. Ted Ramon DO,    I am writing to confirm that your patient, Cherrie Chapa, received care in my office on 08/02/24. I have enclosed a summary of the care provided to Cherrie for your reference.    Please contact me with any questions you may have regarding the visit.    Sincerely,         Marcela Yu MD  [address]  [facility] [id number] [phone]    CC: No Recipients

## 2024-08-03 LAB
C TRACH RRNA SPEC QL NAA+PROBE: NEGATIVE
CD3+CD4+ CELLS # BLD: 1.83 X10E9/L
CD3+CD4+ CELLS # BLD: 1831 /MM3
CD3+CD4+ CELLS NFR BLD: 42 %
CD3+CD4+ CELLS/CD3+CD8+ CLL BLD: 1.05 %
CD3+CD8+ CELLS # BLD: 1.74 X10E9/L
CD3+CD8+ CELLS NFR BLD: 40 %
LYMPHOCYTES # SPEC AUTO: 4.36 X10*3/UL
N GONORRHOEA DNA SPEC QL PROBE+SIG AMP: NEGATIVE

## 2024-08-04 LAB
HIV1 RNA # PLAS NAA DL=20: NOT DETECTED {COPIES}/ML
HIV1 RNA SPEC NAA+PROBE-LOG#: NORMAL {LOG_COPIES}/ML

## 2024-08-06 DIAGNOSIS — R32 URINARY INCONTINENCE, UNSPECIFIED TYPE: ICD-10-CM

## 2024-08-08 ENCOUNTER — TELEPHONE (OUTPATIENT)
Dept: IMMUNOLOGY | Facility: CLINIC | Age: 44
End: 2024-08-08
Payer: COMMERCIAL

## 2024-08-08 NOTE — TELEPHONE ENCOUNTER
8-8-24    Voice mail left for Ms. Chapa.  Provided session time/ date rescheduling options.    Kera Landry [facility]  Mental Health Counselor

## 2024-08-09 ENCOUNTER — SOCIAL WORK (OUTPATIENT)
Dept: IMMUNOLOGY | Facility: CLINIC | Age: 44
End: 2024-08-09
Payer: COMMERCIAL

## 2024-08-09 ENCOUNTER — CLINICAL SUPPORT (OUTPATIENT)
Dept: NUTRITION | Facility: HOSPITAL | Age: 44
End: 2024-08-09
Payer: COMMERCIAL

## 2024-08-09 DIAGNOSIS — Z59.41 FOOD INSECURITY: ICD-10-CM

## 2024-08-09 SDOH — ECONOMIC STABILITY - FOOD INSECURITY: FOOD INSECURITY: Z59.41

## 2024-08-09 NOTE — PROGRESS NOTES
[facility]     Mental Health Counseling Session Update - Protected Health Information           Date:   8-9-24           Telehealth phone session with Ms. Chapa.                  Total  Time:   15 min                                       Progress:  Good           /  Fair                                                                                                                       Compliant with Psych. Meds:  Yes       Therapeutic Modality:  Supportive                          DBT                                                   Factors regarding Medical treatment:   Multiple medical/ physical concerns/ limitations.                                                                                    Changes (if any) to Initial Assessment:                                                                             Treatment Modality:  Individual    Suicidal/ Homicidal Concerns:  No     Factors aiding or impeding psychotherapy progress:   came     Processed client's thoughts, feelings, and actions.  Focused on distress tolerance and emotion regulation.  Explored ideas, choices, plans, awareness, acceptance, and problem-solving.  Looked at perceptions, assumptions, expectations, and realistic outcomes.  Examined thought, behavioral, emotional, and relationship patterns.  Discussed how difficult it is to change/ interrupt long standing survival skills/ patterns.  Talked about DBT tools that she's implementing daily.  Reflected on client's life experiences and progress.  Provided support and empathetic understanding.                                                       Next Session:    8-14-24     Kera Landry [facility]  Mental Health Counselor
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
## 2024-08-14 ENCOUNTER — SOCIAL WORK (OUTPATIENT)
Dept: IMMUNOLOGY | Facility: CLINIC | Age: 44
End: 2024-08-14
Payer: COMMERCIAL

## 2024-08-14 NOTE — PROGRESS NOTES
[facility]     Mental Health Counseling Session Update - Protected Health Information           Date:   8-14-24           In person session with Ms. Chapa.                  Total  Time:   60 min                                       Progress:  Good           /  Fair                                                                                                                       Compliant with Psych. Meds:  Yes       Therapeutic Modality:  Supportive                          DBT                                                   Factors regarding Medical treatment:   Multiple medical/ physical concerns/ limitations.                                                                                    Changes (if any) to Initial Assessment:                                                                             Treatment Modality:  Individual    Suicidal/ Homicidal Concerns:  No     Factors aiding or impeding psychotherapy progress:   came     Processed client's thoughts, feelings, and actions.  Focused on distress tolerance and emotion regulation.  Explored ideas, choices, plans, awareness, acceptance, and problem-solving.  Looked at perceptions, assumptions, expectations, and realistic outcomes.  Examined thought, behavioral, emotional, and relationship patterns.  Discussed how difficult it is to change/ interrupt long standing survival skills/ patterns.  Talked about DBT tools that she's implementing daily.  Reflected on client's life experiences and progress.  Provided support and empathetic understanding.                                                       Next Session:    8-21-24     Kera Landry [facility]  Mental Health Counselor

## 2024-08-15 ENCOUNTER — HOSPITAL ENCOUNTER (OUTPATIENT)
Dept: RADIOLOGY | Facility: HOSPITAL | Age: 44
Discharge: HOME | End: 2024-08-15
Payer: COMMERCIAL

## 2024-08-15 VITALS — WEIGHT: 189 LBS | HEIGHT: 63 IN | BODY MASS INDEX: 33.49 KG/M2

## 2024-08-15 DIAGNOSIS — Z12.31 SCREENING MAMMOGRAM FOR BREAST CANCER: ICD-10-CM

## 2024-08-15 PROCEDURE — 77067 SCR MAMMO BI INCL CAD: CPT

## 2024-08-21 ENCOUNTER — SOCIAL WORK (OUTPATIENT)
Dept: IMMUNOLOGY | Facility: CLINIC | Age: 44
End: 2024-08-21
Payer: COMMERCIAL

## 2024-08-21 NOTE — PROGRESS NOTES
First visit with breastfeeding mom. Discussed feeding baby on cue. Opening mouth, turning head from side to side, bringing hands to mouth and sucking movements are all early hunger cues. Crying is a late hunger cue. Do lots of skin to skin to help recognize early feeding cues. If baby does not nurse, continue skin to skin and offer again later. On average newborns eat at least 8 or more times per day without restriction. Cluster feeding is normal.  Reviewed positioning techniques and signs of a good latch. Discussed holding baby so that ear, shoulder and hip are in a straight line. Baby is held close and nose lines up with mom's nipple. Discussed supporting baby's neck and mom's breast.  When baby opens wide bring baby quickly onto the breast.  Try for an assymetrical latch with nipple pointed towards the roof of baby's mouth. Mouth should be wide and lips flanged around the breast.  Encouraged to nurse on the first breast until baby finishes that side. If baby comes off the breast quickly, you can re-offer that same side to ensure good stimulation before moving baby to next side. Offer the second breast, baby can take the second breast as long as desired. It is ok if they do not take the second side when offered. Listen and look for swallows. Once milk is in over the next few days, swallowing will become more frequent and obvious. If baby pausing on the breast longer than 10 seconds, remind baby to nurse. Attempt to burp between breasts and after feeding. Rotate which breast the baby starts on. Discussed expected output based on age. Discussed feed on demand. If necessary rouse for feedings at least until above birth weight. Reviewed Breastfeeding Packet and encouraged mom to write down feedings and output at least until first Ped visit.   In accordance with the 2863 AAP Policy Statement on Breastfeeding, Mothers of healthy term  infants should be delay pacifier use until breastfeeding [facility]     Mental Health Counseling Session Update - Protected Health Information           Date:   8-21-24           Virtual telehealth session with Ms. Chapa.                  Total  Time:   60 min                                       Progress:  Good           /  Fair                                                                                                                       Compliant with Psych. Meds:  Yes       Therapeutic Modality:  Supportive                          DBT                                                   Factors regarding Medical treatment:   Multiple medical/ physical concerns/ limitations.                                                                                    Changes (if any) to Initial Assessment:                                                                             Treatment Modality:  Individual    Suicidal/ Homicidal Concerns:  No     Factors aiding or impeding psychotherapy progress:   came     Processed client's thoughts, feelings, and actions.  Focused on distress tolerance and emotion regulation.  Explored ideas, choices, plans, awareness, acceptance, and problem-solving.  Looked at perceptions, assumptions, expectations, and realistic outcomes.  Examined thought, behavioral, emotional, and relationship patterns.  Discussed how difficult it is to change/ interrupt long standing survival skills/ patterns.  Talked about DBT tools that she's implementing daily.  Reflected on client's life experiences and progress.  Provided support and empathetic understanding.                                                       Next Session:    8-28-24     Kera Landry [facility]  Mental Health Counselor   is well-established, usually about 3 to 4 wk after birth. Pacifiers are not available on the Mother Infant Unit. Artificial nipples should also be avoided until breastfeeding is well established.

## 2024-08-22 NOTE — PROGRESS NOTES
Referred by: Dr. Yu     PCP  Ted Ramon DO         CHIEF COMPLAINT:  urinary incontinence         HISTORY OF PRESENT ILLNESS:  This is a  44 y.o. y.o. female who presents with ***    The following were reviewed to gain additional history:  External notes: Dr. Yu note 24, referred to urology for urinary incontinence  Test results: ***         Specifically, she describes the following pelvic floor symptoms:          Prolapse: {yes,no:}       - Splinting to urinate: {yes,no:}       - Splinting for bowel movement/stool trapping: {yes,no:}              Incontinence:  {yes,no:}             {types of incontinence:70656}              Urinary Symptoms:       - Frequency:  {yes,no:}             # Voids:         - Nocturia: {yes,no:}             # Voids:         - Urgency:  {yes,no:}       - Incomplete emptying:  {YES wildcard/NO:60}       - Hesitancy:  {YES wildcard/NO:60}       - Pain with voiding:  {YES wildcard/NO:60}       - Excessive fluid intake: {YES wildcard/NO:60}               History:       - Recurrent UTI:  {YES wildcard/NO:60}       - Hematuria:  {YES wildcard/NO:60}       - Stones:  {YES wildcard/NO:60}       - Kidney Disease:  {YES wildcard/NO:60}                  Bowel Symptoms:       - Regular: {yes,no:}       - Diarrhea:{yes,no:}       - Constipation: {yes,no:}       - Fecal Incontinence:  {yes,no:}       - Flatus Incontinence:  {yes,no:}       - Fecal urgency:   {yes,no:}    Past medical and surgical hx reviewed - pertinent for HIV, HLD, HTN  , tubal ligation  Smoking history: former smoker        Gyn History:  - Menopausal: No  - Pap up to date: No, due and scheduled with Dr. Groves  - Sexually active:  No  - Number of prior vaginal deliveries: ***   Number of prior operative deliveries: ***   Prior OASI? ***  - Number of prior c-sections: ***    - Mammogram up to date: Yes  - Colonoscopy up to date: {CAYLA Yes, No,  N/A:38585}    OB History          2    Para   2    Term   2            AB        Living             SAB        IAB        Ectopic        Multiple        Live Births                           PHYSICAL EXAMINATION:  No LMP recorded. Patient is perimenopausal.  There is no height or weight on file to calculate BMI.  There were no vitals taken for this visit.  General Appearance: well appearing  Neuro: Alert and oriented   HEENT: mucous membranes moist, neck supple  Resp: No respiratory distress, normal work of breathing  MSK: normal range of motion, gait appropriate    Pelvic:  Genitourinary: normal external genitalia, Bartholin's glands negative, [place]'s glands negative  Urethra: normal meatus, non-tender, no periurethral mass  Vaginal mucosa *** normal  Cervix surgically absent*** normal  Uterus surgically absent*** normal size, non-tender, mobile  Adnexae *** negative nontender, no masses  Atrophy {POSITIVE/NEGATIVE:86704}    CST {POSITIVE/NEGATIVE:43566}  Pelvic floor muscle contraction  ***/5    POP-Q (in supine position):       Aa ***     Ba ***     C ***              gh ***     pb ***     tvl ***              Ap ***     Bp ***     D ***    Rectal: no hemorrhoids, fissures or masses***    PVR (by Ultrasound): ***         IMPRESSION AND PLAN:  Cherrie Chapa is a 44 y.o. who presents with ***    ***    All questions and concerns were answered and addressed.  The patient expressed understanding and agrees with the plan.     RTC ***    2024

## 2024-08-23 ENCOUNTER — APPOINTMENT (OUTPATIENT)
Dept: OBSTETRICS AND GYNECOLOGY | Facility: CLINIC | Age: 44
End: 2024-08-23
Payer: COMMERCIAL

## 2024-08-23 DIAGNOSIS — Z13.89 SCREENING FOR BLOOD OR PROTEIN IN URINE: ICD-10-CM

## 2024-08-23 DIAGNOSIS — N39.3 STRESS INCONTINENCE OF URINE: Primary | ICD-10-CM

## 2024-08-26 PROCEDURE — RXMED WILLOW AMBULATORY MEDICATION CHARGE

## 2024-08-28 ENCOUNTER — TELEPHONE (OUTPATIENT)
Dept: IMMUNOLOGY | Facility: CLINIC | Age: 44
End: 2024-08-28
Payer: COMMERCIAL

## 2024-08-28 ENCOUNTER — PHARMACY VISIT (OUTPATIENT)
Dept: PHARMACY | Facility: CLINIC | Age: 44
End: 2024-08-28
Payer: MEDICAID

## 2024-08-28 NOTE — TELEPHONE ENCOUNTER
8-28-24    Client did not log into the virtual telehealth platform link for today's scheduled telehealth session. Two phone attempts made, voice messages left for client.    Kera Landry, [facility]  Mental Health Counselor

## 2024-08-29 DIAGNOSIS — B20 HIV INFECTION, UNSPECIFIED SYMPTOM STATUS (MULTI): ICD-10-CM

## 2024-08-29 RX ORDER — BICTEGRAVIR SODIUM, EMTRICITABINE, AND TENOFOVIR ALAFENAMIDE FUMARATE 50; 200; 25 MG/1; MG/1; MG/1
1 TABLET ORAL DAILY
Qty: 30 TABLET | Refills: 1 | Status: SHIPPED | OUTPATIENT
Start: 2024-08-29

## 2024-08-30 ENCOUNTER — TELEPHONE (OUTPATIENT)
Dept: IMMUNOLOGY | Facility: CLINIC | Age: 44
End: 2024-08-30
Payer: COMMERCIAL

## 2024-08-30 NOTE — TELEPHONE ENCOUNTER
8-30-24    Voice mail left for Ms. Chapa. Provided session date/time rescheduling options.    Kera Landry [facility]  Mental Health Counselor

## 2024-09-03 ENCOUNTER — TELEPHONE (OUTPATIENT)
Dept: IMMUNOLOGY | Facility: CLINIC | Age: 44
End: 2024-09-03
Payer: COMMERCIAL

## 2024-09-03 NOTE — TELEPHONE ENCOUNTER
9-3-24    Voice mail received from Ms. Chapa. Returned her call and spoke with client.  Confirmed her counseling appointment on  9-4-24.    Kera Landry, [facility]  Mental Health Counselor

## 2024-09-04 ENCOUNTER — SOCIAL WORK (OUTPATIENT)
Dept: IMMUNOLOGY | Facility: CLINIC | Age: 44
End: 2024-09-04
Payer: COMMERCIAL

## 2024-09-04 NOTE — PROGRESS NOTES
[facility]     Mental Health Counseling Session Update - Protected Health Information           Date:   9-4-24           Telehealth phone session with Ms. Chapa.                  Total  Time:   60 min                                       Progress:  Good           /  Fair                                                                                                                       Compliant with Psych. Meds:  Yes       Therapeutic Modality:  Supportive                          DBT                                                   Factors regarding Medical treatment:   Multiple medical/ physical concerns/ limitations.                                                                                    Changes (if any) to Initial Assessment:                                                                             Treatment Modality:  Individual    Suicidal/ Homicidal Concerns:  No     Factors aiding or impeding psychotherapy progress:   came     Processed client's thoughts, feelings, and actions.  Focused on distress tolerance and emotion regulation.  Explored ideas, choices, plans, awareness, acceptance, and problem-solving.  Looked at perceptions, assumptions, expectations, and realistic outcomes.  Examined thought, behavioral, emotional, and relationship patterns.  Discussed how difficult it is to change/ interrupt long standing survival skills/ patterns.  Talked about DBT tools that she's implementing daily.  Reflected on client's life experiences and progress.  Provided support and empathetic understanding.                                                       Next Session:    9-10-24     Kera Landry [facility]  Mental Health Counselor

## 2024-09-06 ENCOUNTER — OFFICE VISIT (OUTPATIENT)
Dept: OBSTETRICS AND GYNECOLOGY | Facility: CLINIC | Age: 44
End: 2024-09-06
Payer: COMMERCIAL

## 2024-09-06 VITALS
TEMPERATURE: 98 F | SYSTOLIC BLOOD PRESSURE: 98 MMHG | HEART RATE: 78 BPM | DIASTOLIC BLOOD PRESSURE: 61 MMHG | OXYGEN SATURATION: 98 % | HEIGHT: 63 IN | BODY MASS INDEX: 32.6 KG/M2 | WEIGHT: 184 LBS | RESPIRATION RATE: 16 BRPM

## 2024-09-06 DIAGNOSIS — R32 URINARY INCONTINENCE, UNSPECIFIED TYPE: Primary | ICD-10-CM

## 2024-09-06 DIAGNOSIS — N39.3 SUI (STRESS URINARY INCONTINENCE, FEMALE): ICD-10-CM

## 2024-09-06 DIAGNOSIS — Z13.89 SCREENING FOR BLOOD OR PROTEIN IN URINE: ICD-10-CM

## 2024-09-06 LAB
POC APPEARANCE, URINE: CLEAR
POC BILIRUBIN, URINE: NEGATIVE
POC BLOOD, URINE: NEGATIVE
POC COLOR, URINE: YELLOW
POC GLUCOSE, URINE: NEGATIVE MG/DL
POC KETONES, URINE: ABNORMAL MG/DL
POC LEUKOCYTES, URINE: NEGATIVE
POC NITRITE,URINE: NEGATIVE
POC PH, URINE: 7 PH
POC PROTEIN, URINE: NEGATIVE MG/DL
POC SPECIFIC GRAVITY, URINE: 1.02
POC UROBILINOGEN, URINE: 1 EU/DL

## 2024-09-06 PROCEDURE — 81003 URINALYSIS AUTO W/O SCOPE: CPT | Mod: QW | Performed by: STUDENT IN AN ORGANIZED HEALTH CARE EDUCATION/TRAINING PROGRAM

## 2024-09-06 ASSESSMENT — LIFESTYLE VARIABLES
HOW OFTEN DO YOU HAVE SIX OR MORE DRINKS ON ONE OCCASION: NEVER
HOW MANY STANDARD DRINKS CONTAINING ALCOHOL DO YOU HAVE ON A TYPICAL DAY: PATIENT DOES NOT DRINK
AUDIT-C TOTAL SCORE: 0
SKIP TO QUESTIONS 9-10: 1
HOW OFTEN DO YOU HAVE A DRINK CONTAINING ALCOHOL: NEVER

## 2024-09-06 ASSESSMENT — PATIENT HEALTH QUESTIONNAIRE - PHQ9
2. FEELING DOWN, DEPRESSED OR HOPELESS: NOT AT ALL
SUM OF ALL RESPONSES TO PHQ9 QUESTIONS 1 AND 2: 0
1. LITTLE INTEREST OR PLEASURE IN DOING THINGS: NOT AT ALL

## 2024-09-06 ASSESSMENT — ENCOUNTER SYMPTOMS
LOSS OF SENSATION IN FEET: 0
OCCASIONAL FEELINGS OF UNSTEADINESS: 0

## 2024-09-06 ASSESSMENT — COLUMBIA-SUICIDE SEVERITY RATING SCALE - C-SSRS
1. IN THE PAST MONTH, HAVE YOU WISHED YOU WERE DEAD OR WISHED YOU COULD GO TO SLEEP AND NOT WAKE UP?: NO
6. HAVE YOU EVER DONE ANYTHING, STARTED TO DO ANYTHING, OR PREPARED TO DO ANYTHING TO END YOUR LIFE?: NO
2. HAVE YOU ACTUALLY HAD ANY THOUGHTS OF KILLING YOURSELF?: NO

## 2024-09-06 ASSESSMENT — PAIN SCALES - GENERAL: PAINLEVEL: 0-NO PAIN

## 2024-09-06 NOTE — PROGRESS NOTES
Referred by: Dr. Yu     PCP  Ted Ramon DO         CHIEF COMPLAINT:  urinary incontinence         HISTORY OF PRESENT ILLNESS:  This is a  44 y.o. y.o. female who presents with JUSTYNA with cough, sneeze.     The following were reviewed to gain additional history:  External notes: Dr. Yu note 24, referred to urology for urinary incontinence  Test results: A1c 5.1% 24, HIV not detected 24         Specifically, she describes the following pelvic floor symptoms:          Prolapse: No       - Splinting to urinate: No       - Splinting for bowel movement/stool trapping: No              Incontinence:  Yes             Mixed              Urinary Symptoms:       - Frequency:  No             # Voids:         - Nocturia: Yes             # Voids:  maybe once, but has to urinate 5 times before going to bed       - Urgency:  No       - Incomplete emptying:  No       - Hesitancy:  No       - Pain with voiding:  No       - Excessive fluid intake: No, one cup coffee in AM, 60 oz water during day               History:       - Recurrent UTI:  No       - Hematuria:  No       - Stones:  No       - Kidney Disease:  No                  Bowel Symptoms:       - Regular: Yes       - Diarrhea: occasionally with HIV meds       - Constipation: No       - Fecal Incontinence:  No       - Flatus Incontinence:  No       - Fecal urgency:   No    Past medical and surgical hx reviewed - pertinent for HIV, HLD, HTN   x 2, tubal ligation, hysterectomy 3 years ago for AUB, [facility] Dr. Coyle 19  Smoking history: former smoker        Gyn History:  - Menopausal: s/p hyst  - Pap up to date: No, due and scheduled with Dr. Groves  - Sexually active:  No  - Number of prior vaginal deliveries: 0   Number of prior operative deliveries: 0   Prior OASI? 0  - Number of prior c-sections: 2    - Mammogram up to date: Yes  - Colonoscopy up to date: No    OB History          2    Para   2    Term   2            AB         Living             SAB        IAB        Ectopic        Multiple        Live Births                           PHYSICAL EXAMINATION:  No LMP recorded. Patient is perimenopausal.  There is no height or weight on file to calculate BMI.  There were no vitals taken for this visit.  General Appearance: well appearing  Neuro: Alert and oriented   HEENT: mucous membranes moist, neck supple  Resp: No respiratory distress, normal work of breathing  MSK: normal range of motion, gait appropriate    Pelvic:  Genitourinary: normal external genitalia, [place]th[place]'s glands negative, [place]'s glands negative  Urethra: normal meatus, non-tender, no periurethral mass  Vaginal mucosa  normal  Cervix surgically absent  Uterus surgically absent  Adnexae  negative nontender, no masses  Atrophy negative    CST negative    POP-Q (in supine position):  No significant prolapse    Rectal: no hemorrhoids, fissures or masses    PVR (by Ultrasound): 79 ml         IMPRESSION AND PLAN:  Cherrie Chapa is a 44 y.o. who presents with JUSTYNA    JUSTYNA  - discussed etiology of JUSTYNA and potential risk factors  - reviewed management strategies including PFPT, anti-incontinence ring, urethral bulking injections and midurethral sling placement  - opting for starting with PFPT, referral made today    OAB  - discussed etiology of OAB and potential management options  - reviewed bladder health recommendations (fluid intake, avoiding bladder triggers)  - discussed treatment options: PFPT, medications, PTNS, intradetrusor botox, SNM  - opting for starting with PFPT        All questions and concerns were answered and addressed.  The patient expressed understanding and agrees with the plan.     RTC as needed    9/6/2024

## 2024-09-10 ENCOUNTER — SOCIAL WORK (OUTPATIENT)
Dept: IMMUNOLOGY | Facility: CLINIC | Age: 44
End: 2024-09-10
Payer: COMMERCIAL

## 2024-09-10 NOTE — PROGRESS NOTES
[facility]     Mental Health Counseling Session Update - Protected Health Information           Date:   9-10-24           In person session with Ms. Chapa.                  Total  Time:   45 min                                       Progress:  Good           /  Fair                                                                                                                       Compliant with Psych. Meds:  Yes       Therapeutic Modality:  Supportive                          DBT                                                   Factors regarding Medical treatment:   Multiple medical/ physical concerns/ limitations.                                                                                    Changes (if any) to Initial Assessment:                                                                             Treatment Modality:  Individual    Suicidal/ Homicidal Concerns:  No     Factors aiding or impeding psychotherapy progress:   came     Processed client's thoughts, feelings, and actions.  Focused on distress tolerance and emotion regulation.  Explored ideas, choices, plans, awareness, acceptance, and problem-solving.  Looked at perceptions, assumptions, expectations, and realistic outcomes.  Examined thought, behavioral, emotional, and relationship patterns.  Discussed somatic symptoms in the midst of recent situations.  Talked about DBT tools that she's implementing daily.  Reflected on client's life experiences and progress.  Provided support and empathetic understanding.                                                       Next Session:    9-17-24     Kera Landry [facility]  Mental Health Counselor

## 2024-09-11 ENCOUNTER — APPOINTMENT (OUTPATIENT)
Dept: BEHAVIORAL HEALTH | Facility: CLINIC | Age: 44
End: 2024-09-11
Payer: COMMERCIAL

## 2024-09-11 ENCOUNTER — CLINICAL SUPPORT (OUTPATIENT)
Dept: NUTRITION | Facility: HOSPITAL | Age: 44
End: 2024-09-11
Payer: COMMERCIAL

## 2024-09-11 DIAGNOSIS — F44.9 DISASSOCIATION DISORDER: ICD-10-CM

## 2024-09-11 DIAGNOSIS — G47.00 INSOMNIA, UNSPECIFIED TYPE: ICD-10-CM

## 2024-09-11 DIAGNOSIS — F31.81 BIPOLAR 2 DISORDER (MULTI): ICD-10-CM

## 2024-09-11 DIAGNOSIS — F43.10 PTSD (POST-TRAUMATIC STRESS DISORDER): ICD-10-CM

## 2024-09-11 DIAGNOSIS — F42.8 OBSESSIVE THINKING: ICD-10-CM

## 2024-09-11 DIAGNOSIS — F41.1 GAD (GENERALIZED ANXIETY DISORDER): Primary | ICD-10-CM

## 2024-09-11 DIAGNOSIS — R45.4 IRRITABILITY AND ANGER: ICD-10-CM

## 2024-09-11 DIAGNOSIS — F41.9 ANXIETY: ICD-10-CM

## 2024-09-11 PROCEDURE — 99214 OFFICE O/P EST MOD 30 MIN: CPT | Performed by: NURSE PRACTITIONER

## 2024-09-11 RX ORDER — SERTRALINE HYDROCHLORIDE 100 MG/1
200 TABLET, FILM COATED ORAL DAILY
Qty: 180 TABLET | Refills: 3 | Status: SHIPPED | OUTPATIENT
Start: 2024-09-11 | End: 2025-09-11

## 2024-09-11 ASSESSMENT — PATIENT HEALTH QUESTIONNAIRE - PHQ9
4. FEELING TIRED OR HAVING LITTLE ENERGY: NEARLY EVERY DAY
5. POOR APPETITE OR OVEREATING: MORE THAN HALF THE DAYS
6. FEELING BAD ABOUT YOURSELF - OR THAT YOU ARE A FAILURE OR HAVE LET YOURSELF OR YOUR FAMILY DOWN: MORE THAN HALF THE DAYS
2. FEELING DOWN, DEPRESSED OR HOPELESS: MORE THAN HALF THE DAYS
7. TROUBLE CONCENTRATING ON THINGS, SUCH AS READING THE NEWSPAPER OR WATCHING TELEVISION: NEARLY EVERY DAY
9. THOUGHTS THAT YOU WOULD BE BETTER OFF DEAD, OR OF HURTING YOURSELF: NOT AT ALL
10. IF YOU CHECKED OFF ANY PROBLEMS, HOW DIFFICULT HAVE THESE PROBLEMS MADE IT FOR YOU TO DO YOUR WORK, TAKE CARE OF THINGS AT HOME, OR GET ALONG WITH OTHER PEOPLE: VERY DIFFICULT
3. TROUBLE FALLING OR STAYING ASLEEP OR SLEEPING TOO MUCH: SEVERAL DAYS
1. LITTLE INTEREST OR PLEASURE IN DOING THINGS: NEARLY EVERY DAY
8. MOVING OR SPEAKING SO SLOWLY THAT OTHER PEOPLE COULD HAVE NOTICED. OR THE OPPOSITE, BEING SO FIGETY OR RESTLESS THAT YOU HAVE BEEN MOVING AROUND A LOT MORE THAN USUAL: SEVERAL DAYS

## 2024-09-11 ASSESSMENT — ANXIETY QUESTIONNAIRES
4. TROUBLE RELAXING: SEVERAL DAYS
IF YOU CHECKED OFF ANY PROBLEMS ON THIS QUESTIONNAIRE, HOW DIFFICULT HAVE THESE PROBLEMS MADE IT FOR YOU TO DO YOUR WORK, TAKE CARE OF THINGS AT HOME, OR GET ALONG WITH OTHER PEOPLE: VERY DIFFICULT
7. FEELING AFRAID AS IF SOMETHING AWFUL MIGHT HAPPEN: NOT AT ALL
GAD7 TOTAL SCORE: 9
3. WORRYING TOO MUCH ABOUT DIFFERENT THINGS: NEARLY EVERY DAY
2. NOT BEING ABLE TO STOP OR CONTROL WORRYING: NEARLY EVERY DAY
1. FEELING NERVOUS, ANXIOUS, OR ON EDGE: NOT AT ALL
5. BEING SO RESTLESS THAT IT IS HARD TO SIT STILL: NOT AT ALL
6. BECOMING EASILY ANNOYED OR IRRITABLE: MORE THAN HALF THE DAYS

## 2024-09-11 ASSESSMENT — ENCOUNTER SYMPTOMS
DYSPHORIC MOOD: 1
NERVOUS/ANXIOUS: 1

## 2024-09-11 NOTE — PROGRESS NOTES
"Adult Ambulatory Psychiatry Progress Note      Assessment/Plan     Impression:  Cherrie Chapa is a 44 y.o. female domiciled  with 2 children, on disability, who presents for follow up with CC of \"I've been doing ok. I saw Megan yesterday and to go over the re-certification and I had felt nauseated and I couldn't figure out what was going on, but I felt better later.\"    Plan: Patient was cooperative yet reserved. Feels overall mood is stable but losing a brand new apartment that she was number one on a wait list for, had left her feeling more defeated and upset recently. Reviewed and agreed to increasing Zoloft but leave other medications and treatment plan in place. Continues to see her therapist, Ms. Landry weekly.    Medication: Rexulti 4mg at bedtime. Increases Zoloft 150mg to 200mg every day. Atarax 25-50mg as needed for anxiety spikes and for additional sleep aid. Valium discontinued due to THC still being picked up. .    Reviewed r/b/a, possible side effects of the medication. Client is aware about the benefit outweighs the risk.     Diagnoses and all orders for this visit:  TIM (generalized anxiety disorder)  -     sertraline (Zoloft) 100 mg tablet; Take 2 tablets (200 mg) by mouth once daily  Bipolar 2 disorder (Multi)  -     sertraline (Zoloft) 100 mg tablet; Take 2 tablets (200 mg) by mouth once daily  Anxiety  PTSD (post-traumatic stress disorder)  -     sertraline (Zoloft) 100 mg tablet; Take 2 tablets (200 mg) by mouth once daily  Obsessive thinking  -     sertraline (Zoloft) 100 mg tablet; Take 2 tablets (200 mg) by mouth once daily  Disassociation disorder  Irritability and anger  Insomnia, unspecified type        Therapy: none    Other: n/a    Patient is reminded that if there is SI to call [phone] and get themselves to the closest ED for evaluation, otherwise contact me for other questions/concerns.     Subjective   HPI:  \"Both the patient, and family and caregivers and guardians as " "appropriate, were informed of the current need to conduct treatment via telephone. I have confirmed the patient's identity via the following (minimum of three) acceptable identifiers as per  Policy PH-9: , [place] home address.\"     Virtual or Telephone Consent    An interactive audio and video telecommunication system which permits real time communications between the patient (at the originating site) and provider (at the distant site) was utilized to provide this telehealth service.   Verbal consent was requested and obtained from Cherrie Chapa on this date, 24 for a telehealth visit.     Present Illness - anxiety, depression     Characteristics/Recent psychiatric symptoms (pertinent positives and negatives) - reports overall mood is stable and is making more effort to leave her apartment more often, especially since she got her new puppy and is going to a local dog park. Reveals she has been taking her son with her to [place] to walk together as well. Reports the new apartment building that she was supposed to move into, and had filed her paperwork with, had been filled and had been so upset with finding out that the unit that was hers was given to someone else, had left 'me feeling so gutted. I cried all day and night. I talked with the manager and she said she didn't know that I was on the list as she was given and that was filled. So I think I should talk with my  about this and [facility].' Reports missing her daughter but remains more motivated to do things in her apartment, like cleaning, doing laundry. Reports however with time is feeling less upset, but also feels, after talking with her therapist, to go to the apartment building and see how much is finished and if the apartment is completed and accessible or not. Otherwise feels 'ok. I will rechannel this energy into fall cleaning. Reveals a food days after the bad news regarding the apartment, she feels the anxiety that " was somewhat heightened, was attending an appt in [place] and having been there so many times before, had experienced a disassociative episode of forgetting where she was, 'a first in a really long time.' Reports her kids are doing well, and both the cat and new dog are doing well. Son is doing well and enjoying his classes. Sleep is better, where she is now getting tireder than usual because of the puppy and is in bed by 10pm, may awaken by 3am to let the puppy out to urinate and then goes back to sleep and then wakes up by 7am. Feels more sleep consistently is helpful but still wakes up tired. Reports energy levels and mental/physical fatigue are 'right down the middle. At only a 5.' Reveals 'low appetite'. Reports noticing only occasional racing, and obsessive thoughts but now in association with the loss of the apartment 'if anything.'     Onset/timeframe - 1 month  Type - anxiety, depression  Duration - situational  Aggravating and/or relieving factors/triggers - loss of getting into new apartment she was first on wait list for, but is going to make the most of the situation, be proactive and find out what happened and not dwell on the negative of the situation.  Treatment and treatment changes (new meds, dosage increases or decreases, med compliance, therapy frequency, etc.) (Past and Recent) - She sees Megan Landry, her therapist twice a week with [facility] - DCBT and psychodynamic therapy. Rexulti 4mg @[facility], Zoloft 150mg QD, Diazepam 5mg 1 tablet PRN daily (on hold), Atarax 25-50mg PRN. Topamax 75mg BID (managed by pain management doctor) for neck pain     Issues: Denies SI/HI/AVH.     Questioned about going up one more time on Zoloft.           Review of Systems   Eyes:         Just came from eye exam with drop for dilation so light sensitivity is noted   Psychiatric/Behavioral:  Positive for dysphoric mood. The patient is nervous/anxious.    All other systems reviewed and are  negative.      OARRS:  Moody Peoples, APRN-CNP on 9/16/2024  7:52 PM  I have personally reviewed the OARRS report for Cherrie Chapa. I have considered the risks of abuse, dependence, addiction and diversion    Is the patient prescribed a combination of a benzodiazepine and opioid?  No    Last Urine Drug Screen / ordered today: Yes  Recent Results (from the past 8760 hour(s))   Benzodiazepine Confirmation, Urine    Collection Time: 04/15/24  4:46 PM   Result Value Ref Range    Clonazepam <25 <25 ng/mL    7-Aminoclonazepam <25 <25 ng/mL    Alprazolam <25 <25 ng/mL    Alpha-Hydroxyalprazolam <25 <25 ng/mL    Midazolam <25 <25 ng/mL    Alpha-Hydroxymidazolam <25 <25 ng/mL    Chlordiazepoxide <25 <25 ng/mL    Diazepam <25 <25 ng/mL    Nordiazepam 145 (H) <25 ng/mL    Temazepam 472 (H) <25 ng/mL    Oxazepam 274 (H) <25 ng/mL    Lorazepam <25 <25 ng/mL   Drug Screen, Urine With Reflex to Confirmation    Collection Time: 04/15/24  4:46 PM   Result Value Ref Range    Amphetamine Screen, Urine Presumptive Negative Presumptive Negative    Barbiturate Screen, Urine Presumptive Negative Presumptive Negative    Benzodiazepines Screen, Urine Presumptive Positive (A) Presumptive Negative    Cannabinoid Screen, Urine Presumptive Negative Presumptive Negative    Cocaine Metabolite Screen, Urine Presumptive Negative Presumptive Negative    Fentanyl Screen, Urine Presumptive Negative Presumptive Negative    Opiate Screen, Urine Presumptive Negative Presumptive Negative    Oxycodone Screen, Urine Presumptive Negative Presumptive Negative    PCP Screen, Urine Presumptive Negative Presumptive Negative    Methadone Screen, Urine Presumptive Negative Presumptive Negative     N/A    Clinical rationale for not completing a Urine Drug Screen: Restarting orders for drug screen put in place.      Controlled Substance Agreement:  Date of the Last Agreement: 03/23/2024    Objective   Mental Status Exam:  General Appearance: Well groomed,  appropriate eye contact  Attitude/Behavior: Cooperative, Distracted  Motor: No psychomotor agitation or retardation, no tremor or other abnormal movements  Speech: Normal rate, volume, prosody  Gait/Station: Other:(comment) (sitting in living room, over virtual connection)  Mood: 'doing ok'  Affect: Dysphoric, constricted but reactive, Flat, Anxious, Congruent with mood and topic of conversation  Thought Process: Linear, goal directed, Flight of ideas (obsessive)  Thought Associations: No loosening of associations  Thought Content: Other: (comment) (upset and disappointed of losing apartment she was first on wait list for and notices she is somewhat more anxious if not more down, but is working on herself via therapy)  Perception: No perceptual abnormalities noted  Sensorium: Alert and oriented to person, place, time and situation  Insight: Fair  Judgement: Fair  Cognition: Cognitively intact to conversational testing with respect to attention, orientation, fund of knowledge, recent and remote memory, and language  Testing: N/A    TIM-7/PHQ-9 scores reviewed: 9, 17 compared to 8, 15 reflecting a mild increase with anxiety and depression/mood.    Current Medications:  Current Outpatient Medications on File Prior to Visit   Medication Sig Dispense Refill    acetaminophen (Tylenol) 325 mg tablet Take 2 tablets (650 mg) by mouth every 6 hours.      albuterol (Ventolin HFA) 90 mcg/actuation inhaler Inhale 2 puffs every 6 hours if needed for wheezing. 18 g 1    Biktarvy -25 mg tablet Take 1 tablet by mouth once daily. 30 tablet 1    brexpiprazole 4 mg tablet Take 4 mg by mouth once daily. 60 tablet 11    cyclobenzaprine (Flexeril) 5 mg tablet Take 1 tablet (5 mg) by mouth 3 times a day as needed for muscle spasms. 30 tablet 2    ergocalciferol (Vitamin D-2) 1.25 MG (14903 UT) capsule Take 1 capsule (1,250 mcg) by mouth 1 (one) time per week.      hydrOXYzine HCL (Atarax) 25 mg tablet Take 1 tablet (25 mg) by mouth  once daily as needed for anxiety. 90 tablet 1    ibuprofen 600 mg tablet Take 1 tablet (600 mg) by mouth 3 times a day with meals. 90 tablet 2    lidocaine (Lidoderm) 5 % patch Place 1 patch on the skin every 12 hours. (Leave on for 12 hours, then remove and leave off for 12 hours)      mv-min-FA-vit K-lutein-zeaxant (PreserVision AREDS 2 Plus MV) 200 mcg-15 mcg- 5 mg-1 mg capsule Take 1 capsule by mouth 2 times a day.      naproxen (Naprosyn) 500 mg tablet Take 1 tablet (500 mg) by mouth 2 times daily (morning and late afternoon).      nystatin (Mycostatin) 100,000 unit/gram powder Apply 1 Application topically 2 times a day. 60 g 1    polyethylene glycol (Glycolax, Miralax) 17 gram/dose powder Take 17 g by mouth. 17 gram/dose powder      topiramate (Topamax) 25 mg tablet Take 1 tablet (25 mg) by mouth. Take per titration, fax to pharmacy      [DISCONTINUED] sertraline (Zoloft) 100 mg tablet Take 1 tablet (100 mg) by mouth once daily. 1 and .5 tablets daily 90 tablet 3     No current facility-administered medications on file prior to visit.       Lab Review:   Office Visit on 09/06/2024   Component Date Value    POC Color, Urine 09/06/2024 Yellow     POC Appearance, Urine 09/06/2024 Clear     POC Glucose, Urine 09/06/2024 NEGATIVE     POC Bilirubin, Urine 09/06/2024 NEGATIVE     POC Ketones, Urine 09/06/2024 TRACE (A)     POC Specific Gravity, Ur* 09/06/2024 1.020     POC Blood, Urine 09/06/2024 NEGATIVE     POC PH, Urine 09/06/2024 7.0     POC Protein, Urine 09/06/2024 NEGATIVE     POC Urobilinogen, Urine 09/06/2024 1.0     Poc Nitrite, Urine 09/06/2024 NEGATIVE     POC Leukocytes, Urine 09/06/2024 NEGATIVE    Office Visit on 08/02/2024   Component Date Value    Neisseria gonorrhea,Ampl* 08/02/2024 Negative     Chlamydia trachomatis, A* 08/02/2024 Negative     Glucose 08/02/2024 76     Sodium 08/02/2024 141     Potassium 08/02/2024 3.9     Chloride 08/02/2024 104     Bicarbonate 08/02/2024 27     Anion Gap  08/02/2024 14     Urea Nitrogen 08/02/2024 11     Creatinine 08/02/2024 0.85     eGFR 08/02/2024 86     Calcium 08/02/2024 9.6     Albumin 08/02/2024 4.1     Alkaline Phosphatase 08/02/2024 73     Total Protein 08/02/2024 6.5     AST 08/02/2024 18     Bilirubin, Total 08/02/2024 0.5     ALT 08/02/2024 18     Hemoglobin A1C 08/02/2024 5.1     Estimated Average Glucose 08/02/2024 100     HIV-1 RNA PCR Viral Load* 08/02/2024      HIV RNA Result 08/02/2024 Not Detected     Cholesterol 08/02/2024 224 (H)     HDL-Cholesterol 08/02/2024 36.9     Cholesterol/HDL Ratio 08/02/2024 6.1     LDL Calculated 08/02/2024 124 (H)     VLDL 08/02/2024 63 (H)     Triglycerides 08/02/2024 317 (H)     Non HDL Cholesterol 08/02/2024 187 (H)     Syphilis Total Ab 08/02/2024 Nonreactive     Lymphocyte Absolute 08/02/2024 4.36     CD3+CD4+% 08/02/2024 42     CD3+CD4+ Absolute 08/02/2024 1.831     CD3+CD8+% 08/02/2024 40 (H)     CD3+CD8+ Absolute 08/02/2024 1.744 (H)     CD4/CD8 Ratio 08/02/2024 1.05     CD3+CD4+ Absolute (/mm3) 08/02/2024 1,831     WBC 08/02/2024 8.3     nRBC 08/02/2024 0.0     RBC 08/02/2024 4.22     Hemoglobin 08/02/2024 13.8     Hematocrit 08/02/2024 41.4     MCV 08/02/2024 98     MCH 08/02/2024 32.7     MCHC 08/02/2024 33.3     RDW 08/02/2024 11.9     Platelets 08/02/2024 348     Neutrophils % 08/02/2024 37.4     Immature Granulocytes %,* 08/02/2024 0.1     Lymphocytes % 08/02/2024 52.3     Monocytes % 08/02/2024 7.0     Eosinophils % 08/02/2024 2.6     Basophils % 08/02/2024 0.6     Neutrophils Absolute 08/02/2024 3.12     Immature Granulocytes Ab* 08/02/2024 0.01     Lymphocytes Absolute 08/02/2024 4.36     Monocytes Absolute 08/02/2024 0.58     Eosinophils Absolute 08/02/2024 0.22     Basophils Absolute 08/02/2024 0.05    Lab on 04/15/2024   Component Date Value    WBC 04/15/2024 9.1     nRBC 04/15/2024 0.0     RBC 04/15/2024 4.20     Hemoglobin 04/15/2024 13.7     Hematocrit 04/15/2024 40.2     MCV 04/15/2024 96      MCH 04/15/2024 32.6     MCHC 04/15/2024 34.1     RDW 04/15/2024 12.2     Platelets 04/15/2024 377     Neutrophils % 04/15/2024 48.9     Immature Granulocytes %,* 04/15/2024 0.3     Lymphocytes % 04/15/2024 41.6     Monocytes % 04/15/2024 6.9     Eosinophils % 04/15/2024 2.0     Basophils % 04/15/2024 0.3     Neutrophils Absolute 04/15/2024 4.44     Immature Granulocytes Ab* 04/15/2024 0.03     Lymphocytes Absolute 04/15/2024 3.78     Monocytes Absolute 04/15/2024 0.63     Eosinophils Absolute 04/15/2024 0.18     Basophils Absolute 04/15/2024 0.03     Chlamydia trachomatis, A* 04/15/2024 Negative     Glucose 04/15/2024 112 (H)     Sodium 04/15/2024 139     Potassium 04/15/2024 3.8     Chloride 04/15/2024 105     Bicarbonate 04/15/2024 25     Anion Gap 04/15/2024 13     Urea Nitrogen 04/15/2024 9     Creatinine 04/15/2024 0.78     eGFR 04/15/2024 >90     Calcium 04/15/2024 9.7     Albumin 04/15/2024 4.2     Alkaline Phosphatase 04/15/2024 65     Total Protein 04/15/2024 6.7     AST 04/15/2024 15     Bilirubin, Total 04/15/2024 0.5     ALT 04/15/2024 16     Hemoglobin A1C 04/15/2024 5.3     Estimated Average Glucose 04/15/2024 105     Cholesterol 04/15/2024 228 (H)     HDL-Cholesterol 04/15/2024 36.1     Cholesterol/HDL Ratio 04/15/2024 6.3     LDL Calculated 04/15/2024      VLDL 04/15/2024      Triglycerides 04/15/2024 409 (H)     Non HDL Cholesterol 04/15/2024 192 (H)     Syphilis Total Ab 04/15/2024 Nonreactive     Trichomonas Vaginalis 04/15/2024 Negative     Amphetamine Screen, Urine 04/15/2024 Presumptive Negative     Barbiturate Screen, Urine 04/15/2024 Presumptive Negative     Benzodiazepines Screen, * 04/15/2024 Presumptive Positive (A)     Cannabinoid Screen, Urine 04/15/2024 Presumptive Negative     Cocaine Metabolite Scree* 04/15/2024 Presumptive Negative     Fentanyl Screen, Urine 04/15/2024 Presumptive Negative     Opiate Screen, Urine 04/15/2024 Presumptive Negative     Oxycodone Screen, Urine 04/15/2024  Presumptive Negative     PCP Screen, Urine 04/15/2024 Presumptive Negative     Methadone Screen, Urine 04/15/2024 Presumptive Negative     Lymphocyte Absolute 04/15/2024 4.09     CD3+CD4+% 04/15/2024 47     CD3+CD4+ Absolute 04/15/2024 1.922     CD3+CD8+% 04/15/2024 37 (H)     CD3+CD8+ Absolute 04/15/2024 1.513 (H)     CD4/CD8 Ratio 04/15/2024 1.27     CD3+CD4+ Absolute (/mm3) 04/15/2024 1,922     WBC 04/15/2024 9.3     nRBC 04/15/2024 0.0     RBC 04/15/2024 4.15     Hemoglobin 04/15/2024 13.7     Hematocrit 04/15/2024 39.6     MCV 04/15/2024 95     MCH 04/15/2024 33.0     MCHC 04/15/2024 34.6     RDW 04/15/2024 12.1     Platelets 04/15/2024 370     Neutrophils % 04/15/2024 46.5     Immature Granulocytes %,* 04/15/2024 0.3     Lymphocytes % 04/15/2024 43.9     Monocytes % 04/15/2024 7.2     Eosinophils % 04/15/2024 1.8     Basophils % 04/15/2024 0.3     Neutrophils Absolute 04/15/2024 4.33     Immature Granulocytes Ab* 04/15/2024 0.03     Lymphocytes Absolute 04/15/2024 4.09     Monocytes Absolute 04/15/2024 0.67     Eosinophils Absolute 04/15/2024 0.17     Basophils Absolute 04/15/2024 0.03     Clonazepam 04/15/2024 <25     7-Aminoclonazepam 04/15/2024 <25     Alprazolam 04/15/2024 <25     Alpha-Hydroxyalprazolam 04/15/2024 <25     Midazolam 04/15/2024 <25     Alpha-Hydroxymidazolam 04/15/2024 <25     Chlordiazepoxide 04/15/2024 <25     Diazepam 04/15/2024 <25     Nordiazepam 04/15/2024 145 (H)     Temazepam 04/15/2024 472 (H)     Oxazepam 04/15/2024 274 (H)     Lorazepam 04/15/2024 <25          Time Spent:    Prep time: 1 min.  Direct patient time: 30 min.  Documentation time: 5 min.  Total time: 36 min.    Next Appointment:  Follow up in about 1 month (around 10/11/2024).

## 2024-09-11 NOTE — PROGRESS NOTES
[employer]  Diet Recommendation 1: Heart Healthy  Diet Recommendation 2: Healthy Eating  Food Intolerance Avoidance: NKFA  Household Size: 2 Family Members  Interventions: Referral Number: 1st 6 Mo Referral 6 Mos  Interventions: Visit Number: 2 of 6 Visits - Max 6 Visits/Referral Each 6 Mo Period  Education Today: [employer] Meals  Grains: 0-25% Whole  Fruit: Fresh - 100%  Vegetables: 50-75% Fresh  Proteins: 1-2 Plant-based Items  Dairy: 50-75% Lowfat  Originating Site of Referral Order: [facility]  Initials of RD Assisting Today: MICHELE

## 2024-09-17 ENCOUNTER — TELEPHONE (OUTPATIENT)
Dept: IMMUNOLOGY | Facility: CLINIC | Age: 44
End: 2024-09-17
Payer: COMMERCIAL

## 2024-09-17 NOTE — TELEPHONE ENCOUNTER
9-17-24    Ms. Chapa did not log into the virtual telehealth platform link for today's scheduled telehealth session. Two phone attempts made, voice messages left for client.    Kera Landry, [facility]  Mental Health Counselor

## 2024-09-18 ENCOUNTER — SOCIAL WORK (OUTPATIENT)
Dept: IMMUNOLOGY | Facility: CLINIC | Age: 44
End: 2024-09-18
Payer: COMMERCIAL

## 2024-09-18 NOTE — PROGRESS NOTES
[facility]     Mental Health Counseling Session Update - Protected Health Information           Date:   9-18-24           Telehealth phone session with Ms. Chapa.                  Total  Time:   30 min                                       Progress:  Good           /  Fair                                                                                                                       Compliant with Psych. Meds:  Yes       Therapeutic Modality:  Supportive                          DBT                                                   Factors regarding Medical treatment:   Multiple medical/ physical concerns/ limitations.                                                                                    Changes (if any) to Initial Assessment:                                                                             Treatment Modality:  Individual    Suicidal/ Homicidal Concerns:  No     Factors aiding or impeding psychotherapy progress:   came     Processed client's thoughts, feelings, and actions.  Focused on distress tolerance and emotion regulation.  Looked at perceptions, assumptions, expectations, and realistic outcomes.  Examined thought, behavioral, emotional, and relationship patterns.  Discussed triggers and long term affects of trauma.  Explored ideas, choices, plans, awareness, acceptance, and problem-solving.  Talked about DBT tools that she's implementing daily.  Reflected on client's life experiences and progress.  Provided support and empathetic understanding.                                                       Next Session:    9-24-24     Kera Landry [facility]  Mental Health Counselor

## 2024-09-24 ENCOUNTER — SOCIAL WORK (OUTPATIENT)
Dept: IMMUNOLOGY | Facility: CLINIC | Age: 44
End: 2024-09-24
Payer: COMMERCIAL

## 2024-09-24 NOTE — PROGRESS NOTES
[facility]     Mental Health Counseling Session Update - Protected Health Information           Date:   9-24-24           In person session with Ms. Chapa.                  Total  Time:   60 min                                       Progress:  Good           /  Fair                                                                                                                       Compliant with Psych. Meds:  Yes       Therapeutic Modality:  Supportive                          DBT                                                   Factors regarding Medical treatment:   Multiple medical/ physical concerns/ limitations.                                                                                    Changes (if any) to Initial Assessment:                                                                             Treatment Modality:  Individual    Suicidal/ Homicidal Concerns:  No     Factors aiding or impeding psychotherapy progress:   came     Processed client's thoughts, feelings, and actions.  Focused on distress tolerance and emotion regulation.  Looked at perceptions, assumptions, expectations, and realistic outcomes.  Examined thought, behavioral, emotional, and relationship patterns.  Discussed triggers and long term affects of trauma.  Explored ideas, choices, plans, awareness, acceptance, and problem-solving.  Talked about establishing and maintaining limits/ boundaries despite push-back.  Reflected on client's life experiences and progress.  Provided support and empathetic understanding.                                                       Next Session:    10-1-24     Kera Landry [facility]  Mental Health Counselor

## 2024-10-01 ENCOUNTER — TELEPHONE (OUTPATIENT)
Dept: IMMUNOLOGY | Facility: CLINIC | Age: 44
End: 2024-10-01
Payer: COMMERCIAL

## 2024-10-01 NOTE — TELEPHONE ENCOUNTER
10-1-24    Voice mail received from Ms. Chapa. She cancelled today's scheduled session.  Return  voice mail left for client. Offered a new session time on 10-2-24.    Kera Landry, [facility]  Mental Health Counselor

## 2024-10-02 ENCOUNTER — TELEPHONE (OUTPATIENT)
Dept: IMMUNOLOGY | Facility: CLINIC | Age: 44
End: 2024-10-02
Payer: COMMERCIAL

## 2024-10-02 NOTE — TELEPHONE ENCOUNTER
10-2-24    Voice mail received from Ms. Chapa. Client cancelled today's scheduled session.       Kera Landry [facility]  Mental Health Counselor

## 2024-10-02 NOTE — TELEPHONE ENCOUNTER
10-2-24    Called and spoke with Ms. Chapa.   Confirmed her counseling appointment time for later today.    Kera Landry, [facility]  Mental Health Counselor

## 2024-10-03 ENCOUNTER — SOCIAL WORK (OUTPATIENT)
Dept: IMMUNOLOGY | Facility: CLINIC | Age: 44
End: 2024-10-03
Payer: COMMERCIAL

## 2024-10-03 NOTE — PROGRESS NOTES
[facility]     Mental Health Counseling Session Update - Protected Health Information           Date:   10-3-24           Telehealth phone session with Ms. Chapa.                  Total  Time:   45 min                                       Progress:  Good           /  Fair                                                                                                                       Compliant with Psych. Meds:  Yes       Therapeutic Modality:  Supportive                          DBT                                                   Factors regarding Medical treatment:   Multiple medical/ physical concerns/ limitations.                                                                                    Changes (if any) to Initial Assessment:                                                                             Treatment Modality:  Individual    Suicidal/ Homicidal Concerns:  No     Factors aiding or impeding psychotherapy progress:   came     Processed client's thoughts, feelings, and actions.  Focused on distress tolerance and emotion regulation.  Looked at perceptions, assumptions, expectations, and realistic outcomes.  Examined thought, behavioral, emotional, and relationship patterns.  Explored ideas, choices, plans, awareness, acceptance, and problem-solving.  Talked about establishing and maintaining limits/ boundaries despite push-back.  Reflected on client's life experiences and progress.  Provided support and empathetic understanding.                                                       Next Session:    10-8-24     Kera Landry [facility]  Mental Health Counselor

## 2024-10-08 ENCOUNTER — SOCIAL WORK (OUTPATIENT)
Dept: IMMUNOLOGY | Facility: CLINIC | Age: 44
End: 2024-10-08
Payer: COMMERCIAL

## 2024-10-08 NOTE — PROGRESS NOTES
[facility]     Mental Health Counseling Session Update - Protected Health Information           Date:   10-8-24           Telehealth Virtual session with Ms. Chapa.                  Total  Time:   60 min                                       Progress:  Good           /  Fair                                                                                                                       Compliant with Psych. Meds:  Yes       Therapeutic Modality:  Supportive                          DBT                                                   Factors regarding Medical treatment:   Multiple medical/ physical concerns/ limitations.                                                                                    Changes (if any) to Initial Assessment:                                                                             Treatment Modality:  Individual    Suicidal/ Homicidal Concerns:  No     Factors aiding or impeding psychotherapy progress:   came     Processed client's thoughts, feelings, and actions.  Focused on distress tolerance and emotion regulation.  Looked at perceptions, assumptions, expectations, and realistic outcomes.  Examined thought, behavioral, emotional, and relationship patterns.  Explored ideas, choices, plans, awareness, acceptance, and problem-solving.  Talked about establishing and maintaining limits/ boundaries despite push-back.  Reflected on client's life experiences and progress.  Provided support and empathetic understanding.                                                       Next Session:    10-22-24     Kera Landry [facility]  Mental Health Counselor

## 2024-10-09 ENCOUNTER — CLINICAL SUPPORT (OUTPATIENT)
Dept: NUTRITION | Facility: HOSPITAL | Age: 44
End: 2024-10-09
Payer: COMMERCIAL

## 2024-10-09 ENCOUNTER — APPOINTMENT (OUTPATIENT)
Dept: OBSTETRICS AND GYNECOLOGY | Facility: CLINIC | Age: 44
End: 2024-10-09
Payer: COMMERCIAL

## 2024-10-09 PROCEDURE — RXMED WILLOW AMBULATORY MEDICATION CHARGE

## 2024-10-09 NOTE — PROGRESS NOTES
Food For Life  Diet Recommendation 1: Heart Healthy  Diet Recommendation 2: Healthy Eating  Food Intolerance Avoidance: NKFA  Household Size: 2 Family Members  Interventions: Referral Number: 1st 6 Mo Referral 6 Mos  Interventions: Visit Number: 3 of 6 Visits - Max 6 Visits/Referral Each 6 Mo Period  Education Today: Healthy Eating on a Budget  Follow Up Notes for Future Visits: Discussed high protein and high fiber foods today.  Grains: 0-25% Whole  Fruit: Fresh - 100%  Vegetables: 50-75% Fresh  Proteins: 1-2 Plant-based Items  Dairy: 50-75% Lowfat  Originating Site of Referral Order: [facility]  Initials of RD Assisting Today: MICHELE

## 2024-10-11 ENCOUNTER — APPOINTMENT (OUTPATIENT)
Dept: BEHAVIORAL HEALTH | Facility: CLINIC | Age: 44
End: 2024-10-11
Payer: COMMERCIAL

## 2024-10-11 ENCOUNTER — PHARMACY VISIT (OUTPATIENT)
Dept: PHARMACY | Facility: CLINIC | Age: 44
End: 2024-10-11
Payer: MEDICAID

## 2024-10-11 VITALS
BODY MASS INDEX: 33.88 KG/M2 | HEIGHT: 63 IN | HEART RATE: 67 BPM | SYSTOLIC BLOOD PRESSURE: 99 MMHG | WEIGHT: 191.2 LBS | RESPIRATION RATE: 16 BRPM | DIASTOLIC BLOOD PRESSURE: 51 MMHG | TEMPERATURE: 98.6 F

## 2024-10-11 DIAGNOSIS — R45.4 IRRITABILITY AND ANGER: ICD-10-CM

## 2024-10-11 DIAGNOSIS — F42.8 OBSESSIVE THINKING: ICD-10-CM

## 2024-10-11 DIAGNOSIS — F43.0 PANIC ATTACK AS REACTION TO STRESS: ICD-10-CM

## 2024-10-11 DIAGNOSIS — F41.0 PANIC ATTACK AS REACTION TO STRESS: ICD-10-CM

## 2024-10-11 DIAGNOSIS — F51.04 PSYCHOPHYSIOLOGICAL INSOMNIA: ICD-10-CM

## 2024-10-11 DIAGNOSIS — F31.81 BIPOLAR 2 DISORDER (MULTI): ICD-10-CM

## 2024-10-11 DIAGNOSIS — F44.9 DISASSOCIATION DISORDER: ICD-10-CM

## 2024-10-11 DIAGNOSIS — F41.1 GAD (GENERALIZED ANXIETY DISORDER): ICD-10-CM

## 2024-10-11 DIAGNOSIS — F43.10 PTSD (POST-TRAUMATIC STRESS DISORDER): ICD-10-CM

## 2024-10-11 ASSESSMENT — COLUMBIA-SUICIDE SEVERITY RATING SCALE - C-SSRS
ATTEMPT LIFETIME: NO
TOTAL  NUMBER OF INTERRUPTED ATTEMPTS LIFETIME: NO
TOTAL  NUMBER OF ABORTED OR SELF INTERRUPTED ATTEMPTS LIFETIME: NO
6. HAVE YOU EVER DONE ANYTHING, STARTED TO DO ANYTHING, OR PREPARED TO DO ANYTHING TO END YOUR LIFE?: NO
2. HAVE YOU ACTUALLY HAD ANY THOUGHTS OF KILLING YOURSELF?: NO
1. HAVE YOU WISHED YOU WERE DEAD OR WISHED YOU COULD GO TO SLEEP AND NOT WAKE UP?: NO

## 2024-10-11 ASSESSMENT — ENCOUNTER SYMPTOMS
DYSPHORIC MOOD: 1
NERVOUS/ANXIOUS: 1

## 2024-10-11 NOTE — PROGRESS NOTES
"Adult Ambulatory Psychiatry Progress Note      Assessment/Plan     Impression:  Cherrie Chapa is a 44 y.o. female domiciled  with 2 children, on disability, who presents for follow up with CC of \"Everything is good except, she (the landlord) started things up again. The eviction and everything was stopped legally but now she is trying to evict me again and has this letter on my door and I have all the paperwork showing this is not true. The money is in escrow. I am so over this with her.\" (Appt started late as patient arrived 10 minutes late but had to end on time.)    Plan: Patient was cooperative yet anxious. Reports landlord had decided to restart harassing her with demanding rent but the entire situation was settled in court and now has to reopen the case and finds herself feeling stressed.Even with increased dose of Zoloft, she is feeling too overwhelmed. Reviewed and reminded patient to use Hydroxyzine for anxiety and sleep aid which she indicated she had not thought of doing. Agreed to leaving medications and treatment plan in place. Valium is not restarted as patient uses THC. Continues to see her therapist, Ms. Landry weekly.    Medication: Rexulti 4mg at bedtime. Increases Zoloft 150mg to 200mg every day. Atarax 25-50mg as needed for anxiety spikes and for additional sleep aid.     Reviewed r/b/a, possible side effects of the medication. Client is aware about the benefit outweighs the risk.     Diagnoses and all orders for this visit:  Bipolar 2 disorder (Multi)  Disassociation disorder  Irritability and anger  Obsessive thinking  Panic attack as reaction to stress  PTSD (post-traumatic stress disorder)  TIM (generalized anxiety disorder)  Psychophysiological insomnia          Therapy: none    Other: n/a    Patient is reminded that if there is SI to call [phone] and get themselves to the closest ED for evaluation, otherwise contact me for other questions/concerns.     Subjective   HPI:    Present " Illness - anxiety, depression     Characteristics/Recent psychiatric symptoms (pertinent positives and negatives) - reports overall mood is heightened, 'feeling wired', anxiety is up because of recent issues with her landlord leaving her with paperwork saying she owes past due rent again, but the patient went through legal process again, had the case closed over the summer with legal representation and she thought it was done and behind her since July 2024. Now the issue started up again 2 weeks ago at the end of September, and admits she was up until 1am the weekend of the 27th, thinking about the situation, with racing, obsessive thoughts about what to do and has been restarting the process legally as a restart. However because of the stress, she had her first migraine over the past weekend that resulted in nausea, vomiting, and no energy for the entire weekend. She went and saw her therapist at the beginning of the week and Caridad was made aware of the situation, but the stress had brought her mood down so much, that she is back to laying in bed, not wanting to move, no energy. Reports noting she has felt triggered again, on edge and disassociating with finding herself moving from task to task 'where I am glitching. I will be doing something, then I will come out of it and realize I am doing something else, and then I stopped and then realize I am doing another thing but I don't remember what I had just done before.' Reports this is not her norm again, as it was all stable for quite sometime, since 6 months ago. Reports her son is having some trouble at school and his school counselor is helping him and the patient with resources. Her emotional support dog has often times intervened in her and her son when she feels herself getting upset with her son when she finds out he has gotten into trouble at school. Admits when feeling overwhelmed by everything in her life, she will take her dog with her for walks at  [employer] and [employer]. Reports sleep is down again d/t the stress lately - sleep totaling 4 hours because of racing thoughts keeping her awake at night and interrupted sleep. Admits to at times nightmares that have interrupted her sleep. Wondered if it was eric she has been experiencing but upon further introspection, admits the stress is triggering her anger and frustration that is part of her anxiety keeping her awake longer at night. Reports energy levels and mental/physical fatigue are 'down again.' Reveals her appetite is down again. Reports return of racing, obsessive, ruminating and often intrusive thoughts.     Onset/timeframe - 1 month  Type - anxiety  Duration - daily  Aggravating and/or relieving factors/triggers - restart of issues by landlord, after it was all settled in court over the summer, all in patient's favor - so feeling stressed again.  Treatment and treatment changes (new meds, dosage increases or decreases, med compliance, therapy frequency, etc.) (Past and Recent) - She sees Megan Landry, her therapist twice a week with [facility] - DCBT and psychodynamic therapy. Rexulti 4mg @[facility], Zoloft 150mg QD, Diazepam 5mg 1 tablet PRN daily (on hold), Atarax 25-50mg PRN. Topamax 75mg BID (managed by pain management doctor) for neck pain     Issues: Denies SI/HI/AVH.     Still uses THC to try and manage her anxiety and sleep.    Reports an old friend is trying to re-enter into her life, but feels that as her mood/life has changed, she is trying to keep her distance/boundary in place out of apprehensive.           Review of Systems   Eyes:         Just came from eye exam with drop for dilation so light sensitivity is noted   Psychiatric/Behavioral:  Positive for dysphoric mood. The patient is nervous/anxious.    All other systems reviewed and are negative.      OARRS:  Moody Peoples, YANELI-CNP on 10/11/2024 10:09 AM  I have personally reviewed the OARRS report for Cherrie Chapa.  I have considered the risks of abuse, dependence, addiction and diversion    Is the patient prescribed a combination of a benzodiazepine and opioid?  No    Last Urine Drug Screen / ordered today: Yes  Recent Results (from the past 8760 hour(s))   Benzodiazepine Confirmation, Urine    Collection Time: 04/15/24  4:46 PM   Result Value Ref Range    Clonazepam <25 <25 ng/mL    7-Aminoclonazepam <25 <25 ng/mL    Alprazolam <25 <25 ng/mL    Alpha-Hydroxyalprazolam <25 <25 ng/mL    Midazolam <25 <25 ng/mL    Alpha-Hydroxymidazolam <25 <25 ng/mL    Chlordiazepoxide <25 <25 ng/mL    Diazepam <25 <25 ng/mL    Nordiazepam 145 (H) <25 ng/mL    Temazepam 472 (H) <25 ng/mL    Oxazepam 274 (H) <25 ng/mL    Lorazepam <25 <25 ng/mL   Drug Screen, Urine With Reflex to Confirmation    Collection Time: 04/15/24  4:46 PM   Result Value Ref Range    Amphetamine Screen, Urine Presumptive Negative Presumptive Negative    Barbiturate Screen, Urine Presumptive Negative Presumptive Negative    Benzodiazepines Screen, Urine Presumptive Positive (A) Presumptive Negative    Cannabinoid Screen, Urine Presumptive Negative Presumptive Negative    Cocaine Metabolite Screen, Urine Presumptive Negative Presumptive Negative    Fentanyl Screen, Urine Presumptive Negative Presumptive Negative    Opiate Screen, Urine Presumptive Negative Presumptive Negative    Oxycodone Screen, Urine Presumptive Negative Presumptive Negative    PCP Screen, Urine Presumptive Negative Presumptive Negative    Methadone Screen, Urine Presumptive Negative Presumptive Negative     N/A    Clinical rationale for not completing a Urine Drug Screen: Restarting orders for drug screen put in place.      Controlled Substance Agreement:  Date of the Last Agreement: 03/23/2024    Objective   Mental Status Exam:  General Appearance: Well groomed, appropriate eye contact  Attitude/Behavior: Cooperative, Guarded, Distracted  Motor: No psychomotor agitation or retardation, no tremor or  other abnormal movements  Speech: Normal rate, volume, prosody  Gait/Station: WFL - Within functional limits  Mood: good but stressed'  Affect: Euthymic, full-range, Constricted, Anxious, Congruent with mood and topic of conversation  Thought Process: Perseverative, Flight of ideas (obsessive)  Thought Associations: No loosening of associations  Thought Content: Normal, Other: (comment) (upset with landlord messing around again with false accusations about rent, having already settled this through court)  Perception: No perceptual abnormalities noted  Sensorium: Alert and oriented to person, place, time and situation  Insight: Intact  Judgement: Fair  Cognition: Cognitively intact to conversational testing with respect to attention, orientation, fund of knowledge, recent and remote memory, and language  Testing: N/A    TIM-7/PHQ-9 scores reviewed: 9, 17 compared to 8, 15 reflecting a mild increase with anxiety and depression/mood.    Current Medications:  Current Outpatient Medications on File Prior to Visit   Medication Sig Dispense Refill    acetaminophen (Tylenol) 325 mg tablet Take 2 tablets (650 mg) by mouth every 6 hours.      albuterol (Ventolin HFA) 90 mcg/actuation inhaler Inhale 2 puffs every 6 hours if needed for wheezing. 18 g 1    Biktarvy -25 mg tablet Take 1 tablet by mouth once daily. 30 tablet 1    brexpiprazole 4 mg tablet Take 4 mg by mouth once daily. 60 tablet 11    cyclobenzaprine (Flexeril) 5 mg tablet Take 1 tablet (5 mg) by mouth 3 times a day as needed for muscle spasms. 30 tablet 2    ergocalciferol (Vitamin D-2) 1.25 MG (81211 UT) capsule Take 1 capsule (1,250 mcg) by mouth 1 (one) time per week.      hydrOXYzine HCL (Atarax) 25 mg tablet Take 1 tablet (25 mg) by mouth once daily as needed for anxiety. 90 tablet 1    ibuprofen 600 mg tablet Take 1 tablet (600 mg) by mouth 3 times a day with meals. 90 tablet 2    lidocaine (Lidoderm) 5 % patch Place 1 patch on the skin every 12  hours. (Leave on for 12 hours, then remove and leave off for 12 hours)      mv-min-FA-vit K-lutein-zeaxant (PreserVision AREDS 2 Plus MV) 200 mcg-15 mcg- 5 mg-1 mg capsule Take 1 capsule by mouth 2 times a day.      naproxen (Naprosyn) 500 mg tablet Take 1 tablet (500 mg) by mouth 2 times daily (morning and late afternoon).      nystatin (Mycostatin) 100,000 unit/gram powder Apply 1 Application topically 2 times a day. 60 g 1    polyethylene glycol (Glycolax, Miralax) 17 gram/dose powder Mix 17 g of powder and drink. 17 gram/dose powder      sertraline (Zoloft) 100 mg tablet Take 2 tablets (200 mg) by mouth once daily 180 tablet 3    topiramate (Topamax) 25 mg tablet Take 1 tablet (25 mg) by mouth. Take per titration, fax to pharmacy       No current facility-administered medications on file prior to visit.       Lab Review:   Office Visit on 09/06/2024   Component Date Value    POC Color, Urine 09/06/2024 Yellow     POC Appearance, Urine 09/06/2024 Clear     POC Glucose, Urine 09/06/2024 NEGATIVE     POC Bilirubin, Urine 09/06/2024 NEGATIVE     POC Ketones, Urine 09/06/2024 TRACE (A)     POC Specific Gravity, Ur* 09/06/2024 1.020     POC Blood, Urine 09/06/2024 NEGATIVE     POC PH, Urine 09/06/2024 7.0     POC Protein, Urine 09/06/2024 NEGATIVE     POC Urobilinogen, Urine 09/06/2024 1.0     Poc Nitrite, Urine 09/06/2024 NEGATIVE     POC Leukocytes, Urine 09/06/2024 NEGATIVE    Office Visit on 08/02/2024   Component Date Value    Neisseria gonorrhea,Ampl* 08/02/2024 Negative     Chlamydia trachomatis, A* 08/02/2024 Negative     Glucose 08/02/2024 76     Sodium 08/02/2024 141     Potassium 08/02/2024 3.9     Chloride 08/02/2024 104     Bicarbonate 08/02/2024 27     Anion Gap 08/02/2024 14     Urea Nitrogen 08/02/2024 11     Creatinine 08/02/2024 0.85     eGFR 08/02/2024 86     Calcium 08/02/2024 9.6     Albumin 08/02/2024 4.1     Alkaline Phosphatase 08/02/2024 73     Total Protein 08/02/2024 6.5     AST 08/02/2024  18     Bilirubin, Total 08/02/2024 0.5     ALT 08/02/2024 18     Hemoglobin A1C 08/02/2024 5.1     Estimated Average Glucose 08/02/2024 100     HIV-1 RNA PCR Viral Load* 08/02/2024      HIV RNA Result 08/02/2024 Not Detected     Cholesterol 08/02/2024 224 (H)     HDL-Cholesterol 08/02/2024 36.9     Cholesterol/HDL Ratio 08/02/2024 6.1     LDL Calculated 08/02/2024 124 (H)     VLDL 08/02/2024 63 (H)     Triglycerides 08/02/2024 317 (H)     Non HDL Cholesterol 08/02/2024 187 (H)     Syphilis Total Ab 08/02/2024 Nonreactive     Lymphocyte Absolute 08/02/2024 4.36     CD3+CD4+% 08/02/2024 42     CD3+CD4+ Absolute 08/02/2024 1.831     CD3+CD8+% 08/02/2024 40 (H)     CD3+CD8+ Absolute 08/02/2024 1.744 (H)     CD4/CD8 Ratio 08/02/2024 1.05     CD3+CD4+ Absolute (/mm3) 08/02/2024 1,831     WBC 08/02/2024 8.3     nRBC 08/02/2024 0.0     RBC 08/02/2024 4.22     Hemoglobin 08/02/2024 13.8     Hematocrit 08/02/2024 41.4     MCV 08/02/2024 98     MCH 08/02/2024 32.7     MCHC 08/02/2024 33.3     RDW 08/02/2024 11.9     Platelets 08/02/2024 348     Neutrophils % 08/02/2024 37.4     Immature Granulocytes %,* 08/02/2024 0.1     Lymphocytes % 08/02/2024 52.3     Monocytes % 08/02/2024 7.0     Eosinophils % 08/02/2024 2.6     Basophils % 08/02/2024 0.6     Neutrophils Absolute 08/02/2024 3.12     Immature Granulocytes Ab* 08/02/2024 0.01     Lymphocytes Absolute 08/02/2024 4.36     Monocytes Absolute 08/02/2024 0.58     Eosinophils Absolute 08/02/2024 0.22     Basophils Absolute 08/02/2024 0.05    Lab on 04/15/2024   Component Date Value    WBC 04/15/2024 9.1     nRBC 04/15/2024 0.0     RBC 04/15/2024 4.20     Hemoglobin 04/15/2024 13.7     Hematocrit 04/15/2024 40.2     MCV 04/15/2024 96     MCH 04/15/2024 32.6     MCHC 04/15/2024 34.1     RDW 04/15/2024 12.2     Platelets 04/15/2024 377     Neutrophils % 04/15/2024 48.9     Immature Granulocytes %,* 04/15/2024 0.3     Lymphocytes % 04/15/2024 41.6     Monocytes % 04/15/2024 6.9      Eosinophils % 04/15/2024 2.0     Basophils % 04/15/2024 0.3     Neutrophils Absolute 04/15/2024 4.44     Immature Granulocytes Ab* 04/15/2024 0.03     Lymphocytes Absolute 04/15/2024 3.78     Monocytes Absolute 04/15/2024 0.63     Eosinophils Absolute 04/15/2024 0.18     Basophils Absolute 04/15/2024 0.03     Chlamydia trachomatis, A* 04/15/2024 Negative     Glucose 04/15/2024 112 (H)     Sodium 04/15/2024 139     Potassium 04/15/2024 3.8     Chloride 04/15/2024 105     Bicarbonate 04/15/2024 25     Anion Gap 04/15/2024 13     Urea Nitrogen 04/15/2024 9     Creatinine 04/15/2024 0.78     eGFR 04/15/2024 >90     Calcium 04/15/2024 9.7     Albumin 04/15/2024 4.2     Alkaline Phosphatase 04/15/2024 65     Total Protein 04/15/2024 6.7     AST 04/15/2024 15     Bilirubin, Total 04/15/2024 0.5     ALT 04/15/2024 16     Hemoglobin A1C 04/15/2024 5.3     Estimated Average Glucose 04/15/2024 105     Cholesterol 04/15/2024 228 (H)     HDL-Cholesterol 04/15/2024 36.1     Cholesterol/HDL Ratio 04/15/2024 6.3     LDL Calculated 04/15/2024      VLDL 04/15/2024      Triglycerides 04/15/2024 409 (H)     Non HDL Cholesterol 04/15/2024 192 (H)     Syphilis Total Ab 04/15/2024 Nonreactive     Trichomonas Vaginalis 04/15/2024 Negative     Amphetamine Screen, Urine 04/15/2024 Presumptive Negative     Barbiturate Screen, Urine 04/15/2024 Presumptive Negative     Benzodiazepines Screen, * 04/15/2024 Presumptive Positive (A)     Cannabinoid Screen, Urine 04/15/2024 Presumptive Negative     Cocaine Metabolite Scree* 04/15/2024 Presumptive Negative     Fentanyl Screen, Urine 04/15/2024 Presumptive Negative     Opiate Screen, Urine 04/15/2024 Presumptive Negative     Oxycodone Screen, Urine 04/15/2024 Presumptive Negative     PCP Screen, Urine 04/15/2024 Presumptive Negative     Methadone Screen, Urine 04/15/2024 Presumptive Negative     Lymphocyte Absolute 04/15/2024 4.09     CD3+CD4+% 04/15/2024 47     CD3+CD4+ Absolute 04/15/2024 1.922      CD3+CD8+% 04/15/2024 37 (H)     CD3+CD8+ Absolute 04/15/2024 1.513 (H)     CD4/CD8 Ratio 04/15/2024 1.27     CD3+CD4+ Absolute (/mm3) 04/15/2024 1,922     WBC 04/15/2024 9.3     nRBC 04/15/2024 0.0     RBC 04/15/2024 4.15     Hemoglobin 04/15/2024 13.7     Hematocrit 04/15/2024 39.6     MCV 04/15/2024 95     MCH 04/15/2024 33.0     MCHC 04/15/2024 34.6     RDW 04/15/2024 12.1     Platelets 04/15/2024 370     Neutrophils % 04/15/2024 46.5     Immature Granulocytes %,* 04/15/2024 0.3     Lymphocytes % 04/15/2024 43.9     Monocytes % 04/15/2024 7.2     Eosinophils % 04/15/2024 1.8     Basophils % 04/15/2024 0.3     Neutrophils Absolute 04/15/2024 4.33     Immature Granulocytes Ab* 04/15/2024 0.03     Lymphocytes Absolute 04/15/2024 4.09     Monocytes Absolute 04/15/2024 0.67     Eosinophils Absolute 04/15/2024 0.17     Basophils Absolute 04/15/2024 0.03     Clonazepam 04/15/2024 <25     7-Aminoclonazepam 04/15/2024 <25     Alprazolam 04/15/2024 <25     Alpha-Hydroxyalprazolam 04/15/2024 <25     Midazolam 04/15/2024 <25     Alpha-Hydroxymidazolam 04/15/2024 <25     Chlordiazepoxide 04/15/2024 <25     Diazepam 04/15/2024 <25     Nordiazepam 04/15/2024 145 (H)     Temazepam 04/15/2024 472 (H)     Oxazepam 04/15/2024 274 (H)     Lorazepam 04/15/2024 <25          Time Spent:    Prep time: 1 min.  Direct patient time: 19 min.  Documentation time: 6 min.  Total time: 26 min.    Next Appointment:  Follow up in 6 weeks (on 11/20/2024).

## 2024-10-17 DIAGNOSIS — Z21 HIV INFECTION, UNSPECIFIED SYMPTOM STATUS: ICD-10-CM

## 2024-10-17 RX ORDER — BICTEGRAVIR SODIUM, EMTRICITABINE, AND TENOFOVIR ALAFENAMIDE FUMARATE 50; 200; 25 MG/1; MG/1; MG/1
1 TABLET ORAL DAILY
Qty: 30 TABLET | Refills: 1 | Status: SHIPPED | OUTPATIENT
Start: 2024-10-17

## 2024-10-22 ENCOUNTER — SOCIAL WORK (OUTPATIENT)
Dept: IMMUNOLOGY | Facility: CLINIC | Age: 44
End: 2024-10-22
Payer: COMMERCIAL

## 2024-10-22 NOTE — PROGRESS NOTES
[facility]     Mental Health Counseling Session Update - Protected Health Information           Date:   10-22-24           Telehealth Virtual session with Ms. Chapa.                  Total  Time:   60 min                                       Progress:  Good           /  Fair                                                                                                                       Compliant with Psych. Meds:  Yes       Therapeutic Modality:  Supportive                          DBT                                                   Factors regarding Medical treatment:   Multiple medical/ physical concerns/ limitations.                                                                                    Changes (if any) to Initial Assessment:                                                                             Treatment Modality:  Individual    Suicidal/ Homicidal Concerns:  No     Factors aiding or impeding psychotherapy progress:   came     Processed client's thoughts, feelings, and actions.  Focused on distress tolerance and emotion regulation.  Looked at perceptions, assumptions, expectations, and realistic outcomes.  Examined thought, behavioral, emotional, and relationship patterns.  Talked about how difficult it is to change/ interrupt long standing survival skills/ patterns.  Discussed ways to re-engage in her own locus of control.  Reflected on client's life experiences and progress.  Provided support and empathetic understanding.                                                       Next Session:    10-29-24     Kera Landry [facility]  Mental Health Counselor

## 2024-10-23 ENCOUNTER — OFFICE VISIT (OUTPATIENT)
Dept: OBSTETRICS AND GYNECOLOGY | Facility: CLINIC | Age: 44
End: 2024-10-23
Payer: COMMERCIAL

## 2024-10-23 VITALS
TEMPERATURE: 97.9 F | HEART RATE: 65 BPM | WEIGHT: 191.4 LBS | OXYGEN SATURATION: 99 % | RESPIRATION RATE: 16 BRPM | DIASTOLIC BLOOD PRESSURE: 69 MMHG | BODY MASS INDEX: 33.9 KG/M2 | SYSTOLIC BLOOD PRESSURE: 106 MMHG

## 2024-10-23 DIAGNOSIS — Z21 HIV INFECTION, UNSPECIFIED SYMPTOM STATUS: ICD-10-CM

## 2024-10-23 DIAGNOSIS — Z01.419 WELL WOMAN EXAM WITH ROUTINE GYNECOLOGICAL EXAM: Primary | ICD-10-CM

## 2024-10-23 DIAGNOSIS — Z12.4 PAP SMEAR FOR CERVICAL CANCER SCREENING: ICD-10-CM

## 2024-10-23 PROCEDURE — 1036F TOBACCO NON-USER: CPT | Performed by: OBSTETRICS & GYNECOLOGY

## 2024-10-23 PROCEDURE — 99396 PREV VISIT EST AGE 40-64: CPT | Performed by: OBSTETRICS & GYNECOLOGY

## 2024-10-23 ASSESSMENT — PAIN SCALES - GENERAL: PAINLEVEL_OUTOF10: 4

## 2024-10-23 NOTE — PROGRESS NOTES
Subjective   Patient ID: Cherrie Chapa is a 44 y.o. female who presents for No chief complaint on file..  S/p hysterectomy. Pap 2021 ASCUS, HPV neg. Hysterectomy 2021 cervical path negative. Vaginal Pap only. Hyst for AUB.   Mammogram WNL  Saw Dr. Kirby for JUSTYNA.     Review of Systems   All other systems reviewed and are negative.    Objective   Physical Exam  Vitals reviewed.   Constitutional:       Appearance: Normal appearance.   HENT:      Head: Normocephalic and atraumatic.      Nose: Nose normal.      Mouth/Throat:      Mouth: Mucous membranes are moist.   Cardiovascular:      Rate and Rhythm: Normal rate and regular rhythm.   Pulmonary:      Effort: Pulmonary effort is normal.      Breath sounds: Normal breath sounds.   Chest:   Breasts:     Right: Normal.      Left: Normal.   Abdominal:      Palpations: Abdomen is soft.      Tenderness: There is no abdominal tenderness. There is no right CVA tenderness or left CVA tenderness.   Genitourinary:     General: Normal vulva.      Exam position: Lithotomy position.      Vagina: Normal.      Uterus: Absent.       Adnexa: Right adnexa normal and left adnexa normal.   Musculoskeletal:         General: Normal range of motion.      Cervical back: Normal range of motion and neck supple.   Lymphadenopathy:      Upper Body:      Right upper body: No axillary adenopathy.      Left upper body: No axillary adenopathy.   Skin:     General: Skin is warm and dry.   Neurological:      General: No focal deficit present.      Mental Status: She is alert and oriented to person, place, and time.   Psychiatric:         Mood and Affect: Mood normal.         Behavior: Behavior normal.         Thought Content: Thought content normal.         Judgment: Judgment normal.   Assessment/Plan   Problem List Items Addressed This Visit             ICD-10-CM    HIV infection Z21    Relevant Orders    THINPREP PAP     Other Visit Diagnoses         Codes    Well woman exam with routine  gynecological exam    -  Primary Z01.419    Pap smear for cervical cancer screening     Z12.4    Relevant Orders    THINPREP PAP             Lakeisha Groves MD 10/23/24 7:22 AM

## 2024-10-25 ENCOUNTER — APPOINTMENT (OUTPATIENT)
Dept: OPHTHALMOLOGY | Facility: CLINIC | Age: 44
End: 2024-10-25
Payer: COMMERCIAL

## 2024-10-29 ENCOUNTER — SOCIAL WORK (OUTPATIENT)
Dept: IMMUNOLOGY | Facility: CLINIC | Age: 44
End: 2024-10-29
Payer: COMMERCIAL

## 2024-11-05 ENCOUNTER — SOCIAL WORK (OUTPATIENT)
Dept: IMMUNOLOGY | Facility: CLINIC | Age: 44
End: 2024-11-05
Payer: COMMERCIAL

## 2024-11-05 LAB
CYTOLOGY CMNT CVX/VAG CYTO-IMP: NORMAL
HPV HR GENOTYPES PNL CVX NAA+PROBE: NEGATIVE
LAB AP HPV GENOTYPE QUESTION: NO
LAB AP HPV HR: NORMAL
LAB AP PREVIOUS ABNORMAL HISTORY: NORMAL
LABORATORY COMMENT REPORT: NORMAL
LABORATORY COMMENT REPORT: NORMAL
MENSTRUAL HX REPORTED: NORMAL
PATH REPORT.RELEVANT HX SPEC: NORMAL
PATH REPORT.TOTAL CANCER: NORMAL

## 2024-11-05 NOTE — PROGRESS NOTES
[facility]     Mental Health Counseling Session Update - Protected Health Information           Date:   11-4-24           Telehealth phone session with Ms. Chapa.                  Total  Time:   15 min                                       Progress:  Good           /  Fair                                                                                                                       Compliant with Psych. Meds:  Yes       Therapeutic Modality:  Supportive                          DBT                                                   Factors regarding Medical treatment:   Multiple medical/ physical concerns/ limitations.                                                                                    Changes (if any) to Initial Assessment:                                                                             Treatment Modality:  Individual    Suicidal/ Homicidal Concerns:  No     Factors aiding or impeding psychotherapy progress:   came     Processed client's thoughts, feelings, and actions.  Focused on distress tolerance and emotion regulation.  Looked at perceptions, assumptions, expectations, and realistic outcomes.  Talked about how difficult it is to change/ interrupt long standing survival skills/ patterns.  Discussed ways to re-engage in her own locus of control.  Reflected on client's life experiences and progress.  Provided support and empathetic understanding.                                                       Next Session:    11-12-24     Kera Landry [facility]  Mental Health Counselor

## 2024-11-12 ENCOUNTER — SOCIAL WORK (OUTPATIENT)
Dept: IMMUNOLOGY | Facility: CLINIC | Age: 44
End: 2024-11-12
Payer: COMMERCIAL

## 2024-11-12 NOTE — PROGRESS NOTES
[facility]     Mental Health Counseling Session Update - Protected Health Information           Date:   11-12-24           In person session with Ms. Chapa.                  Total  Time:   60 min                                       Progress:  Good    /  Fair                                                                                                                       Compliant with Psych. Meds:  Yes       Therapeutic Modality:  Supportive                          DBT                                                   Factors regarding Medical treatment:   Multiple medical/ physical concerns/ limitations.                                                                                    Changes (if any) to Initial Assessment:                                                                             Treatment Modality:  Individual    Suicidal/ Homicidal Concerns:  No     Factors aiding or impeding psychotherapy progress:   came     Processed client's thoughts, feelings, and actions.  Focused on distress tolerance and emotion regulation.  Looked at perceptions, assumptions, expectations, and realistic outcomes.  Talked about how difficult it is to change/ interrupt long standing survival skills/ patterns.  Discussed ways to re-engage in her own locus of control.  Explored ideas, choices, and problem-solving.  Reflected on client's life experiences and progress.  Provided support and empathetic understanding.                                                       Next Session:    11-19-24     Kera Landry [facility]  Mental Health Counselor

## 2024-11-19 ENCOUNTER — TELEPHONE (OUTPATIENT)
Dept: IMMUNOLOGY | Facility: CLINIC | Age: 44
End: 2024-11-19
Payer: COMMERCIAL

## 2024-11-19 NOTE — TELEPHONE ENCOUNTER
11-19-24    Voice mail received from Ms. Chapa. She cancelled today's scheduled session. Return voice mail left for client. Provided a new session time on 11-22-24.      Kera Landry, [facility]  Mental Health Counselor       Chronic illness

## 2024-11-20 ENCOUNTER — APPOINTMENT (OUTPATIENT)
Dept: BEHAVIORAL HEALTH | Facility: CLINIC | Age: 44
End: 2024-11-20
Payer: COMMERCIAL

## 2024-11-20 ENCOUNTER — TELEPHONE (OUTPATIENT)
Dept: IMMUNOLOGY | Facility: CLINIC | Age: 44
End: 2024-11-20

## 2024-11-20 DIAGNOSIS — F44.9 DISASSOCIATION DISORDER: ICD-10-CM

## 2024-11-20 DIAGNOSIS — F42.8 OBSESSIVE THINKING: ICD-10-CM

## 2024-11-20 DIAGNOSIS — F43.10 PTSD (POST-TRAUMATIC STRESS DISORDER): ICD-10-CM

## 2024-11-20 DIAGNOSIS — F51.04 PSYCHOPHYSIOLOGICAL INSOMNIA: ICD-10-CM

## 2024-11-20 DIAGNOSIS — F41.1 GAD (GENERALIZED ANXIETY DISORDER): Primary | ICD-10-CM

## 2024-11-20 DIAGNOSIS — R45.4 IRRITABILITY AND ANGER: ICD-10-CM

## 2024-11-20 DIAGNOSIS — F31.81 BIPOLAR 2 DISORDER (MULTI): ICD-10-CM

## 2024-11-20 ASSESSMENT — ENCOUNTER SYMPTOMS
DYSPHORIC MOOD: 1
NERVOUS/ANXIOUS: 1

## 2024-11-20 NOTE — PROGRESS NOTES
"Adult Ambulatory Psychiatry Progress Note      Assessment/Plan     Impression:  Cherrie Chapa is a 44 y.o. female domiciled  with 2 children, on disability, who presents for follow up with CC of \" is going to take up my case again, with what the landlord is trying to do with me again. I may also need to hit up the [employer] to help with rent as that has been going up as well. That's stressful. And the election... that's upsetting.\"    Plan: reduces zoloft back down to 150mg. No other changes. Patient was cooperative yet very anxious. Still dealing with landlord harassing her for rent requiring her to re-engage with  to go after the landlord over a settled case, but now prompting her to look for a new place to live, faster, but also outcome of the election had been upsetting. Feels increased dose of Zoloft had no impact and wanted to reduce the dose as she feels it was causing her to be more agitated. Reveals increased disassociative episodes as well, but is working on those with therapist. Reminded patient to use Hydroxyzine for anxiety and sleep aid which again she indicated she had not thought of doing. Agreed to leaving medications (outside of reduced dose of Zoloft), and treatment plan in place while she continues to see her therapist, Ms. Landry weekly.    Medication: Rexulti 4mg at bedtime. Reduces Zoloft 200mg to 150mg every day. Atarax 25-50mg as needed for anxiety spikes and for additional sleep aid.     Reviewed r/b/a, possible side effects of the medication. Client is aware about the benefit outweighs the risk.     Diagnoses and all orders for this visit:  TIM (generalized anxiety disorder)  Bipolar 2 disorder (Multi)  Disassociation disorder  Irritability and anger  Obsessive thinking  PTSD (post-traumatic stress disorder)  Psychophysiological insomnia        Therapy: none    Other: n/a    Patient is reminded that if there is SI to call [phone] and get themselves to the " "closest ED for evaluation, otherwise contact me for other questions/concerns.     Subjective   HPI:  Virtual Consent    An interactive audio and video telecommunication system which permits real time communications between the patient (at home) and provider (at home office) was utilized to provide this telehealth service.   Verbal consent was requested and obtained from Cherrie Chapa on this date, 11/20/24 for a telehealth visit.     Present Illness - anxiety, depression     Characteristics/Recent psychiatric symptoms (pertinent positives and negatives) - reports feeling overwhelmed with everything going on in her life - especially the landlord trying to pull a number on her again, and has been seeing her therapist more frequently, 'as after our last appt, I saw Kera and she saw me disassociate again in front of her. So we did some grounding practices. And here I am making sure to walk the dog, and getting myself out of the apartment.' Reports with winter approaching, and knows she will be staying in more, she is doing more at home projects, organizing things, having pulled apart the different rooms and getting rid of what she doesn't need and keeping what she likes. Reports making sure to practice focusing on letting go of the things that she can't control, the obsessive thinking and wanting outcomes to go her way, often procrastinating and unmotivated if not overwhelmed. \"But I am making effort to take care of me.\" Reports there are different issues in the apartment that need repairs and the landlord is aware of them, but because of her being manipulative, she is ignoring the patient's need and \"I feel like I just need to contact [employer] and have them do an inspection instead.' Reports still hoping to get into the new apartment building that she was on the wait list for, and was accidentally overlooked for, 'and for now I just have to wait and see.' Admits her mood has been down as well, because her anxiety is " "heightened. Reveals her friend whom she normally leans on, has her own issues - son was just found out to have a brain tumor, and the patient is saddened for her, as they wait for the biopsy to help guide them on what to do next. Reports her daughter is doing well, and her son is doing well as he is now having his own ADHD medication is being well managed now and school is getting better. Parents are 'ok' while her mother is becoming frail and her brother who has schizophrenia, she rarely talks with him. Denies manic episodes but is agitated with her current living situation. Her dog, continues to be her major support system in her life as he will help her calm down, especially when she gets overly emotional. \"He reminds me to check with myself, especially when we are walking and I am being out of control, defensive and angry, he just grounds me.\" Reports sleep has improved, back up to 6-7 hrs a night as she does her evening walks with her dog and son but her dog almost 'makes me jog and it's like he is telling me that this is not about you, Cherrie, but about me.' Reveals she has started to  photographing her dog as he is wearing cute little dog clothing for the holidays. Admits to still at times experiencing nightmares that have interrupted her sleep. Reports energy levels are 'fine. I can get through the day. I can however feel mental/physical fatigue but as I stick to a routine, I will take him for a walk every morning and that helps motivate me to get my day started.' Reveals her appetite is 'now in-between.' Reports the racing, ruminating and often intrusive thoughts are quiet again. Doesn't feel the increase dose of the Zoloft 'had too much of a difference on my mood. It's just too much going on in my life right now, so I just went back down to taking 1.5 tablets.'     Onset/timeframe - 1 month  Type - anxiety, depression  Duration - daily  Aggravating and/or relieving factors/triggers - restart of " issues by gopi still ongoing and now needing help from [employer] all over again, needing her to find a new place to live more aggressively and upset with outcome of election. Increased dose of Zoloft didn't have an impact so reduced dose on her own.  Treatment and treatment changes (new meds, dosage increases or decreases, med compliance, therapy frequency, etc.) (Past and Recent) - She sees Megan Landry, her therapist twice a week with [facility] - DCBT and psychodynamic therapy. Rexulti 4mg @[facility], Zoloft 200mg QD, Diazepam 5mg 1 tablet PRN daily (on hold), Atarax 25-50mg PRN. Topamax 75mg BID (managed by pain management doctor) for neck pain     Issues: Denies SI/HI/AVH.     Still uses THC to try and manage her anxiety and sleep.    Reports an old friend is trying to re-enter into her life, but feels that as her mood/life has changed, she is trying to keep her distance/boundary in place out of apprehensive.           Review of Systems   Eyes:         Just came from eye exam with drop for dilation so light sensitivity is noted   Psychiatric/Behavioral:  Positive for dysphoric mood and sleep disturbance. The patient is nervous/anxious.    All other systems reviewed and are negative.      OARRS:  Moody Peoples, YANELI-CNP on 11/24/2024  5:47 PM  I have personally reviewed the OARRS report for Cherrie Chapa. I have considered the risks of abuse, dependence, addiction and diversion    Is the patient prescribed a combination of a benzodiazepine and opioid?  No    Last Urine Drug Screen / ordered today: Yes  Recent Results (from the past 8760 hours)   Benzodiazepine Confirmation, Urine    Collection Time: 04/15/24  4:46 PM   Result Value Ref Range    Clonazepam <25 <25 ng/mL    7-Aminoclonazepam <25 <25 ng/mL    Alprazolam <25 <25 ng/mL    Alpha-Hydroxyalprazolam <25 <25 ng/mL    Midazolam <25 <25 ng/mL    Alpha-Hydroxymidazolam <25 <25 ng/mL    Chlordiazepoxide <25 <25 ng/mL    Diazepam <25 <25 ng/mL     Nordiazepam 145 (H) <25 ng/mL    Temazepam 472 (H) <25 ng/mL    Oxazepam 274 (H) <25 ng/mL    Lorazepam <25 <25 ng/mL   Drug Screen, Urine With Reflex to Confirmation    Collection Time: 04/15/24  4:46 PM   Result Value Ref Range    Amphetamine Screen, Urine Presumptive Negative Presumptive Negative    Barbiturate Screen, Urine Presumptive Negative Presumptive Negative    Benzodiazepines Screen, Urine Presumptive Positive (A) Presumptive Negative    Cannabinoid Screen, Urine Presumptive Negative Presumptive Negative    Cocaine Metabolite Screen, Urine Presumptive Negative Presumptive Negative    Fentanyl Screen, Urine Presumptive Negative Presumptive Negative    Opiate Screen, Urine Presumptive Negative Presumptive Negative    Oxycodone Screen, Urine Presumptive Negative Presumptive Negative    PCP Screen, Urine Presumptive Negative Presumptive Negative    Methadone Screen, Urine Presumptive Negative Presumptive Negative     N/A    Clinical rationale for not completing a Urine Drug Screen: Restarting orders for drug screen put in place.      Controlled Substance Agreement:  Date of the Last Agreement: 03/23/2024    Objective   Mental Status Exam:  General Appearance: Well groomed, appropriate eye contact  Attitude/Behavior: Cooperative, Distracted, Fair eye contact  Motor: No psychomotor agitation or retardation, no tremor or other abnormal movements  Speech: Rapid Speech, pressured, Other: (comment) (perseverative yet redirectable)  Gait/Station: Other:(comment) (sitting on bed, over virtual connection)  Mood: 'stressed'  Affect: Dysphoric, constricted, Anxious, Increased intensity, Congruent with mood and topic of conversation  Thought Process: Perseverative, Flight of ideas (racing, obsessive, ruminating)  Thought Associations: No loosening of associations  Thought Content: Other: (comment) (landlord trying to scam her out of money again, now requires her to get  services back into the picture, trying  to find a new place to live in as well, and the outcome of the election - all weighing on her mind.)  Perception: No perceptual abnormalities noted  Sensorium: Alert and oriented to person, place, time and situation  Insight: Fair  Judgement: Fair  Cognition: Cognitively intact to conversational testing with respect to attention, orientation, fund of knowledge, recent and remote memory, and language  Testing: N/A    TIM-7/PHQ-9 scores reviewed: 9, 17 compared to 8, 15 reflecting a mild increase with anxiety and depression/mood.    Current Medications:  Current Outpatient Medications on File Prior to Visit   Medication Sig Dispense Refill    acetaminophen (Tylenol) 325 mg tablet Take 2 tablets (650 mg) by mouth every 6 hours.      albuterol (Ventolin HFA) 90 mcg/actuation inhaler Inhale 2 puffs every 6 hours if needed for wheezing. 18 g 1    Biktarvy -25 mg tablet Take 1 tablet by mouth once daily. 30 tablet 1    brexpiprazole 4 mg tablet Take 4 mg by mouth once daily. 60 tablet 11    cyclobenzaprine (Flexeril) 5 mg tablet Take 1 tablet (5 mg) by mouth 3 times a day as needed for muscle spasms. 30 tablet 2    ergocalciferol (Vitamin D-2) 1.25 MG (78788 UT) capsule Take 1 capsule (1,250 mcg) by mouth 1 (one) time per week.      hydrOXYzine HCL (Atarax) 25 mg tablet Take 1 tablet (25 mg) by mouth once daily as needed for anxiety. 90 tablet 1    ibuprofen 600 mg tablet Take 1 tablet (600 mg) by mouth 3 times a day with meals. 90 tablet 2    lidocaine (Lidoderm) 5 % patch Place 1 patch on the skin every 12 hours. (Leave on for 12 hours, then remove and leave off for 12 hours)      mv-min-FA-vit K-lutein-zeaxant (PreserVision AREDS 2 Plus MV) 200 mcg-15 mcg- 5 mg-1 mg capsule Take 1 capsule by mouth 2 times a day.      naproxen (Naprosyn) 500 mg tablet Take 1 tablet (500 mg) by mouth 2 times daily (morning and late afternoon).      nystatin (Mycostatin) 100,000 unit/gram powder Apply 1 Application topically 2 times  a day. 60 g 1    polyethylene glycol (Glycolax, Miralax) 17 gram/dose powder Mix 17 g of powder and drink. 17 gram/dose powder      sertraline (Zoloft) 100 mg tablet Take 2 tablets (200 mg) by mouth once daily 180 tablet 3    topiramate (Topamax) 25 mg tablet Take 1 tablet (25 mg) by mouth. Take per titration, fax to pharmacy       No current facility-administered medications on file prior to visit.       Lab Review:   Office Visit on 10/23/2024   Component Date Value    Case Report 10/23/2024                      Value:Gynecologic Cytology                              Case: [id number]                                   Authorizing Provider:  Lakeisha Groves MD   Collected:           10/23/2024 1154              Ordering Location:     [facility]       Received:            10/23/2024 1157              First Screen:          NAEL Bee                                                           Rescreen:              NAEL Song                                                               Specimen:    ThinPrep Liquid-Based Pap-Manual Screen, ECTO/ENDOCERVIX/VAGINA, SCREENING                 Final Cytological Interp* 10/23/2024                      Value:    A. THINPREP PAP ECTO/ENDOCERVIX/VAGINA, SCREENING -     Specimen Adequacy  Satisfactory for evaluation; absence of endocervical/transformation zone component    General Categorization  Negative for intraepithelial lesion or malignancy.    Descriptive Interpretation  Negative for intraepithelial lesion or malignancy  Shift in vaginal darleen suggestive of bacterial vaginosis              10/23/2024                      Value:Slide(s) [facility] NAEL [facility] [id number] [facility] [id number]  QC review performed by NAEL Song at [facility] [facility] [id number]  By the signature on this report, the individual or group listed as making the Final  Interpretation/Diagnosis certifies that they have reviewed this case.       Educational Note 10/23/2024                      Value:Cervical cytology is a screening procedure primarily for squamous cancers and precursors and has associated false-negative and false-positives results as evidenced by published data. Your patient's test should be interpreted in this context, together with the patient's history and clinical findings. Regular sampling and follow-up of unexplained clinical signs and symptoms are recommended to minimize false negative results.      Perform HPV HR test? 10/23/2024 Always (all interpretations)     Include HPV Genotype? 10/23/2024 No     Menstrual status 10/23/2024                      Value:Hysterectomy      Previous abnormal cytolo* 10/23/2024                      Value:ASCUS      Additional Information 10/23/2024                      Value:HIV +      HPV, high-risk 10/23/2024 Negative    Office Visit on 09/06/2024   Component Date Value    POC Color, Urine 09/06/2024 Yellow     POC Appearance, Urine 09/06/2024 Clear     POC Glucose, Urine 09/06/2024 NEGATIVE     POC Bilirubin, Urine 09/06/2024 NEGATIVE     POC Ketones, Urine 09/06/2024 TRACE (A)     POC Specific Gravity, Ur* 09/06/2024 1.020     POC Blood, Urine 09/06/2024 NEGATIVE     POC PH, Urine 09/06/2024 7.0     POC Protein, Urine 09/06/2024 NEGATIVE     POC Urobilinogen, Urine 09/06/2024 1.0     Poc Nitrite, Urine 09/06/2024 NEGATIVE     POC Leukocytes, Urine 09/06/2024 NEGATIVE    Office Visit on 08/02/2024   Component Date Value    Neisseria gonorrhea,Ampl* 08/02/2024 Negative     Chlamydia trachomatis, A* 08/02/2024 Negative     Glucose 08/02/2024 76     Sodium 08/02/2024 141     Potassium 08/02/2024 3.9     Chloride 08/02/2024 104     Bicarbonate 08/02/2024 27     Anion Gap 08/02/2024 14     Urea Nitrogen 08/02/2024 11     Creatinine 08/02/2024 0.85     eGFR 08/02/2024 86     Calcium 08/02/2024 9.6     Albumin 08/02/2024 4.1      Alkaline Phosphatase 08/02/2024 73     Total Protein 08/02/2024 6.5     AST 08/02/2024 18     Bilirubin, Total 08/02/2024 0.5     ALT 08/02/2024 18     Hemoglobin A1C 08/02/2024 5.1     Estimated Average Glucose 08/02/2024 100     HIV-1 RNA PCR Viral Load* 08/02/2024      HIV RNA Result 08/02/2024 Not Detected     Cholesterol 08/02/2024 224 (H)     HDL-Cholesterol 08/02/2024 36.9     Cholesterol/HDL Ratio 08/02/2024 6.1     LDL Calculated 08/02/2024 124 (H)     VLDL 08/02/2024 63 (H)     Triglycerides 08/02/2024 317 (H)     Non HDL Cholesterol 08/02/2024 187 (H)     Syphilis Total Ab 08/02/2024 Nonreactive     Lymphocyte Absolute 08/02/2024 4.36     CD3+CD4+% 08/02/2024 42     CD3+CD4+ Absolute 08/02/2024 1.831     CD3+CD8+% 08/02/2024 40 (H)     CD3+CD8+ Absolute 08/02/2024 1.744 (H)     CD4/CD8 Ratio 08/02/2024 1.05     CD3+CD4+ Absolute (/mm3) 08/02/2024 1,831     WBC 08/02/2024 8.3     nRBC 08/02/2024 0.0     RBC 08/02/2024 4.22     Hemoglobin 08/02/2024 13.8     Hematocrit 08/02/2024 41.4     MCV 08/02/2024 98     MCH 08/02/2024 32.7     MCHC 08/02/2024 33.3     RDW 08/02/2024 11.9     Platelets 08/02/2024 348     Neutrophils % 08/02/2024 37.4     Immature Granulocytes %,* 08/02/2024 0.1     Lymphocytes % 08/02/2024 52.3     Monocytes % 08/02/2024 7.0     Eosinophils % 08/02/2024 2.6     Basophils % 08/02/2024 0.6     Neutrophils Absolute 08/02/2024 3.12     Immature Granulocytes Ab* 08/02/2024 0.01     Lymphocytes Absolute 08/02/2024 4.36     Monocytes Absolute 08/02/2024 0.58     Eosinophils Absolute 08/02/2024 0.22     Basophils Absolute 08/02/2024 0.05          Time Spent:    Prep time: 1 min.  Direct patient time: 32 min.  Documentation time: 6 min.  Total time: 39 min.    Next Appointment:  Follow up in 8 weeks (on 1/15/2025).

## 2024-11-20 NOTE — TELEPHONE ENCOUNTER
11-20-24    Counseling appointment confirmation voice message received from Ms. Chapa for her next session on 11-22-24.      Kera Landry, [facility]  Mental Health Counselor

## 2024-11-22 ENCOUNTER — SOCIAL WORK (OUTPATIENT)
Dept: IMMUNOLOGY | Facility: CLINIC | Age: 44
End: 2024-11-22
Payer: COMMERCIAL

## 2024-11-22 NOTE — PROGRESS NOTES
[facility]     Mental Health Counseling Session Update - Protected Health Information           Date:   11-22-24           Telehealth virtual session with Ms. Chapa.                  Total  Time:   30 min        Progress:  Good    /  Fair                                                                                                                       Compliant with Psych. Meds:  Yes       Therapeutic Modality:  Supportive                          DBT                                                   Factors regarding Medical treatment:   Multiple medical/ physical concerns/ limitations.                                                                                    Changes (if any) to Initial Assessment:                                                                             Treatment Modality:  Individual    Suicidal/ Homicidal Concerns:  No     Factors aiding or impeding psychotherapy progress:   came     Processed client's thoughts, feelings, and actions.  Focused on distress tolerance and emotion regulation.  Looked at perceptions, assumptions, expectations, and realistic outcomes.  Talked about how difficult it is to change/ interrupt long standing survival skills/ patterns.  Discussed ways to re-engage in her own locus of control.  Explored ideas, choices, and problem-solving.  Reflected on client's life experiences and progress.  Provided support and empathetic understanding.                                                       Next Session:    11-27-24     Kera Landry [facility]  Mental Health Counselor

## 2024-11-24 ASSESSMENT — ENCOUNTER SYMPTOMS: SLEEP DISTURBANCE: 1

## 2024-11-27 ENCOUNTER — SOCIAL WORK (OUTPATIENT)
Dept: IMMUNOLOGY | Facility: CLINIC | Age: 44
End: 2024-11-27
Payer: COMMERCIAL

## 2024-11-27 NOTE — PROGRESS NOTES
[facility]     Mental Health Counseling Session Update - Protected Health Information           Date:   11-27-24           Telehealth virtual session with Ms. Chapa.                  Total  Time:   75 min         Progress:  Good    /  Fair                                                                                                                       Compliant with Psych. Meds:  Yes       Therapeutic Modality:  Supportive               DBT        Dynamic                                                Factors regarding Medical treatment:   Multiple medical/ physical concerns/ limitations.                                                                                    Changes (if any) to Initial Assessment:                                                                             Treatment Modality:  Individual    Suicidal/ Homicidal Concerns:  No     Factors aiding or impeding psychotherapy progress:   came     Processed client's thoughts, feelings, and actions.  Focused on distress tolerance and emotion regulation.  Processed a dream, symbolism, and meanings.  Discussed anger, it's meaning/ purpose, healthy expressions of, and processing anger.  Looked at perceptions, assumptions, expectations, and realistic outcomes.  Talked about how difficult it is to change/ interrupt long standing survival skills/ patterns.  Discussed ways to re-engage in her own locus of control.  Explored ideas, choices, and problem-solving.  Reflected on client's life experiences and progress.  Provided support and empathetic understanding.                                                       Next Session:    12-3-24     Kera Landry [facility]  Mental Health Counselor

## 2024-12-03 ENCOUNTER — SOCIAL WORK (OUTPATIENT)
Dept: IMMUNOLOGY | Facility: CLINIC | Age: 44
End: 2024-12-03
Payer: COMMERCIAL

## 2024-12-03 NOTE — PROGRESS NOTES
[facility]     Mental Health Counseling Session Update - Protected Health Information           Date:   12-3-24           Telehealth phone session with Ms. Chapa.                  Total  Time:   60 min         Progress:  Good    /  Fair                                                                                                                       Compliant with Psych. Meds:  Yes       Therapeutic Modality:  Supportive               DBT                                                        Factors regarding Medical treatment:   Multiple medical/ physical concerns/ limitations.                                                                                    Changes (if any) to Initial Assessment:                                                                             Treatment Modality:  Individual    Suicidal/ Homicidal Concerns:  No     Factors aiding or impeding psychotherapy progress:   came     Processed client's thoughts, feelings, and actions.  Focused on distress tolerance and emotion regulation.  Discussed anger, it's meaning/ purpose, healthy expressions of, and processing anger.  Looked at perceptions, assumptions, expectations, and realistic outcomes.  Talked about how difficult it is to change/ interrupt long standing survival skills/ patterns.  Discussed ways to re-engage in her own locus of control.  Explored ideas, choices, and problem-solving.  Reflected on client's life experiences and progress.  Provided support and empathetic understanding.                                                       Next Session:    12-10-24     Kera Landry [facility]  Mental Health Counselor

## 2024-12-10 ENCOUNTER — SOCIAL WORK (OUTPATIENT)
Dept: IMMUNOLOGY | Facility: CLINIC | Age: 44
End: 2024-12-10
Payer: COMMERCIAL

## 2024-12-10 NOTE — PROGRESS NOTES
[facility]     Mental Health Counseling Session Update - Protected Health Information           Date:   12-10-24           Telehealth phone session with Ms. Chapa.                  Total  Time:   75 min         Progress:  Good    /  Fair                                                                                                                       Compliant with Psych. Meds:  Yes       Therapeutic Modality:  Supportive               DBT                                                        Factors regarding Medical treatment:   Multiple medical/ physical concerns/ limitations.                                                                                    Changes (if any) to Initial Assessment:                                                                             Treatment Modality:  Individual    Suicidal/ Homicidal Concerns:  No     Factors aiding or impeding psychotherapy progress:   came     Processed client's thoughts, feelings, and actions.  Focused on distress tolerance and emotion regulation.  Discussed anger, it's meaning/ purpose, healthy expressions of, and processing anger.  Looked at perceptions, assumptions, expectations, and realistic outcomes.  Talked about how difficult it is to change/ interrupt long standing survival skills/ patterns/ dynamics.  Discussed ways to re-engage in her own locus of control.  Explored ideas, choices, and problem-solving.  Reflected on client's life experiences and progress.  Provided support and empathetic understanding.                                                       Next Session:    12-17-24     Kera Landry [facility]  Mental Health Counselor

## 2024-12-17 ENCOUNTER — TELEPHONE (OUTPATIENT)
Dept: IMMUNOLOGY | Facility: CLINIC | Age: 44
End: 2024-12-17
Payer: COMMERCIAL

## 2024-12-17 NOTE — TELEPHONE ENCOUNTER
12-17-24    Ms. Chapa did not come in for today's scheduled session. Two phone attempts made, voice messages left for client.      Kera Landry, [facility]  Mental Health Counselor

## 2024-12-18 ENCOUNTER — TELEPHONE (OUTPATIENT)
Dept: IMMUNOLOGY | Facility: CLINIC | Age: 44
End: 2024-12-18
Payer: COMMERCIAL

## 2024-12-18 NOTE — TELEPHONE ENCOUNTER
12-18-24    MsValarie Chapa called and rescheduled her session for 12-41-24.    Kera Landry, [facility]  Mental Health Counselor

## 2024-12-31 ENCOUNTER — TELEPHONE (OUTPATIENT)
Dept: IMMUNOLOGY | Facility: CLINIC | Age: 44
End: 2024-12-31
Payer: COMMERCIAL

## 2024-12-31 NOTE — TELEPHONE ENCOUNTER
12-31-24    Ms. Chapa did not log into the virtual telehealth platform link for today's scheduled telehealth session.  Two phone attempts made, voice messages left for client.      Kera Landry, [facility]  Mental Health Counselor

## 2025-01-03 ENCOUNTER — SOCIAL WORK (OUTPATIENT)
Dept: IMMUNOLOGY | Facility: CLINIC | Age: 45
End: 2025-01-03
Payer: MEDICAID

## 2025-01-03 NOTE — PROGRESS NOTES
[facility]     Mental Health Counseling Session Update - Protected Health Information           Date:   1-3-25           Telehealth phone session with Ms. Chapa.                  Total  Time:   15 min         Progress:  Good    /  Fair                                                                                                                       Compliant with Psych. Meds:  Yes       Therapeutic Modality:  Supportive               DBT                                                        Factors regarding Medical treatment:   Multiple medical/ physical concerns/ limitations.                                                                                    Changes (if any) to Initial Assessment:                                                                             Treatment Modality:  Individual    Suicidal/ Homicidal Concerns:  No     Factors aiding or impeding psychotherapy progress:   came     Processed client's thoughts, feelings, and actions.  Focused on distress tolerance and emotion regulation.  Looked at perceptions, assumptions, expectations, and realistic outcomes.  Talked about how difficult it is to change/ interrupt long standing survival skills/ patterns/ dynamics.  Discussed ways to re-engage in her own locus of control.  Explored ideas, goals, choices, awareness, discernment, and problem-solving.  Reflected on client's life experiences and progress.  Provided support and empathetic understanding.                                                       Next Session:    1-7-25     Kera Landry [facility]  Mental Health Counselor

## 2025-01-07 ENCOUNTER — SOCIAL WORK (OUTPATIENT)
Dept: IMMUNOLOGY | Facility: CLINIC | Age: 45
End: 2025-01-07
Payer: MEDICAID

## 2025-01-07 NOTE — PROGRESS NOTES
[facility]     Mental Health Counseling Session Update - Protected Health Information           Date:   1-7-25           In person session with Ms. Chapa.                  Total  Time:   75 min         Progress:  Good    /  Fair                                                                                                                       Compliant with Psych. Meds:  Yes       Therapeutic Modality:  Supportive               DBT                                                        Factors regarding Medical treatment:   Multiple medical/ physical concerns/ limitations.                                                                                    Changes (if any) to Initial Assessment:                                                                             Treatment Modality:  Individual    Suicidal/ Homicidal Concerns:  No     Factors aiding or impeding psychotherapy progress:   came     Processed client's thoughts, feelings, and actions.  Focused on distress tolerance, interpersonal effectiveness, and emotion regulation.  Looked at perceptions, assumptions, expectations, and realistic outcomes.  Talked about how difficult it is to change/ interrupt long standing survival skills/ patterns/ dynamics.  Looked at establishing and maintaining limits/ boundaries despite push-back.  Discussed mental health and physical health connectedness.  Explored ideas, goals, choices, awareness, discernment, and problem-solving.  Encouraged re-engaging in self-care routines, exercise, and mindfulness.  Reflected on client's life experiences and progress.  Provided support and empathetic understanding.                                                       Next Session:    1-14-25     Kera Landry [facility]  Mental Health Counselor

## 2025-01-13 ENCOUNTER — TELEPHONE (OUTPATIENT)
Dept: IMMUNOLOGY | Facility: CLINIC | Age: 45
End: 2025-01-13
Payer: MEDICAID

## 2025-01-13 NOTE — TELEPHONE ENCOUNTER
1-13-25    Voice mail received from Ms. Chapa. She cancelled her upcoming session.  Voice mail left for client. Provided a new session time on 1-17-25.      Kera Landry [facility]  Mental Health Counselor

## 2025-01-15 ENCOUNTER — TELEPHONE (OUTPATIENT)
Dept: IMMUNOLOGY | Facility: CLINIC | Age: 45
End: 2025-01-15

## 2025-01-15 ENCOUNTER — APPOINTMENT (OUTPATIENT)
Dept: BEHAVIORAL HEALTH | Facility: CLINIC | Age: 45
End: 2025-01-15
Payer: COMMERCIAL

## 2025-01-15 DIAGNOSIS — F31.81 BIPOLAR 2 DISORDER (MULTI): ICD-10-CM

## 2025-01-15 DIAGNOSIS — F51.04 PSYCHOPHYSIOLOGICAL INSOMNIA: ICD-10-CM

## 2025-01-15 DIAGNOSIS — R45.4 IRRITABILITY AND ANGER: Primary | ICD-10-CM

## 2025-01-15 DIAGNOSIS — F41.1 GAD (GENERALIZED ANXIETY DISORDER): ICD-10-CM

## 2025-01-15 DIAGNOSIS — F41.0 PANIC ATTACK AS REACTION TO STRESS: ICD-10-CM

## 2025-01-15 DIAGNOSIS — F43.0 PANIC ATTACK AS REACTION TO STRESS: ICD-10-CM

## 2025-01-15 DIAGNOSIS — F43.10 PTSD (POST-TRAUMATIC STRESS DISORDER): ICD-10-CM

## 2025-01-15 DIAGNOSIS — F42.8 OBSESSIVE THINKING: ICD-10-CM

## 2025-01-15 ASSESSMENT — ENCOUNTER SYMPTOMS
SLEEP DISTURBANCE: 1
NERVOUS/ANXIOUS: 1
DYSPHORIC MOOD: 1

## 2025-01-15 NOTE — TELEPHONE ENCOUNTER
1-15-25      Voice mail left for MsValarie Eduard.    Kera Landry, [facility]  Mental Health Counselor

## 2025-01-15 NOTE — TELEPHONE ENCOUNTER
1-15-25    Ms. Chapa called and confirmed her counseling appointment on 1-17-25.    Kera Landry, [facility]  Mental Health Counselor

## 2025-01-15 NOTE — PROGRESS NOTES
"Adult Ambulatory Psychiatry Progress Note      Assessment/Plan     Impression:  Cherrie Chapa is a 44 y.o. female domiciled  with 2 children, on disability, who presents for follow up with CC of \"I am ok. The stupid lady (wendyd) is at it again and  is backing me up again. The paperwork is such a pain, that I am glad that I got this  to review things with me as I advocate for myself. She is so passive-aggressive with me, that she aggravates me.\"    Plan: Patient was cooperative yet very anxious. Still dealing with gopi harassing her and requiring ongoing support by . Admits the external stress from the situation is what is upsetting to her and agrees that changing medications at this time is not going to have any impact. After reviewing agreed to leaving medications and treatment plan in place while she continues to see her therapist, Ms. Landry weekly.    Medication: Rexulti 4mg at bedtime. Zoloft 150mg every day. Atarax 25-50mg as needed for anxiety spikes and for additional sleep aid.     Reviewed r/b/a, possible side effects of the medication. Client is aware about the benefit outweighs the risk.     Diagnoses and all orders for this visit:  Irritability and anger  Bipolar 2 disorder (Multi)  Obsessive thinking  PTSD (post-traumatic stress disorder)  TIM (generalized anxiety disorder)  Panic attack as reaction to stress  Psychophysiological insomnia          Therapy: none    Other: n/a    Patient is reminded that if there is SI to call [phone] and get themselves to the closest ED for evaluation, otherwise contact me for other questions/concerns.     Subjective   HPI:  Virtual Consent    An interactive audio and video telecommunication system which permits real time communications between the patient (at home) and provider (at home office) was utilized to provide this telehealth service.   Verbal consent was requested and obtained from Cherrie Chapa on this date, 01/15/2025 " for a telehealth visit.     Present Illness - anxiety, depression     Characteristics/Recent psychiatric symptoms (pertinent positives and negatives) - reports her landlord/ is so irritating to her, that she needs to find ways to calm herself down, as she upsets her easily. Reports with needing to reverify her status, which normally is November of every year, she did it in September, 2 months after the courts ruled in favor of the patient. So there was passed over paperwork per the landlord, that needed to be signed by the patient, and it was sent to the patient but because of the landlord literally harassing the patient so much, she missed the letter in her mail as she thought it was just 'another piece of mail' and the landlord is now holding it against her. Admits she did her research about HUD requirements with the paperwork and the landlord is manipulating the situation again, exactly how she did it the last time and now the situation is worse with the landlord has not done any of her updates/repairs required for a HUD supported living (toilet seat is broken, closet door is falling of) and she has not followed through. Reports she is very anxious, upset and irritable, if not down with how she feels she is being treated and is hoping that all of this will go in front of the  again, and the manager will not show up again. Reports she knows that the manager is trying to manipulate and force the patient to move out, but currently the patient has no new housing to move into at this time and hopes this is settled by the courts soon. Reports feeling her mood is more up and down, but in response to the manager triggering her than anything else, and also she is going after her neighbors whom are 'being rude' with taking over 3 parking spaces when each apartment only is allowed 1 parking space and 'that is not fair.' Reports she needs to continue to pay her rent until she can move out, and her rent  is being paid monthly per HUD calculations. Reports racing, obsessive if not ruminating thoughts as she knows that her landlord is finding out for the first time, 'she has never handled someone who knows her rights, and is fighting back'. Can feel overwhelmed constantly with this situation, anxiety is 'up all the time'. Reveals her appetite is 'down more than usual.'      Onset/timeframe - 1 month  Type - anxiety, depression  Duration - daily  Aggravating and/or relieving factors/triggers - ongoing issues with landlord and needing to get  involved again  Treatment and treatment changes (new meds, dosage increases or decreases, med compliance, therapy frequency, etc.) (Past and Recent) - She sees Megan Landry, her therapist twice a week [facility] - DCBT and psychodynamic therapy. Rexulti 4mg @[facility], Zoloft 200mg QD, Diazepam 5mg 1 tablet PRN daily (on hold), Atarax 25-50mg PRN. Topamax 75mg BID (managed by pain management doctor) for neck pain     Issues: Denies SI/HI/AVH.           Review of Systems   Eyes:         Just came from eye exam with drop for dilation so light sensitivity is noted   Psychiatric/Behavioral:  Positive for dysphoric mood and sleep disturbance. The patient is nervous/anxious.        OARRS:  Moody Peoples, YANELI-CNP on 1/20/2025 11:26 PM  I have personally reviewed the OARRS report for Cherrie Chapa. I have considered the risks of abuse, dependence, addiction and diversion    Is the patient prescribed a combination of a benzodiazepine and opioid?  No    Last Urine Drug Screen / ordered today: Yes  Recent Results (from the past 8760 hours)   Benzodiazepine Confirmation, Urine    Collection Time: 04/15/24  4:46 PM   Result Value Ref Range    Clonazepam <25 <25 ng/mL    7-Aminoclonazepam <25 <25 ng/mL    Alprazolam <25 <25 ng/mL    Alpha-Hydroxyalprazolam <25 <25 ng/mL    Midazolam <25 <25 ng/mL    Alpha-Hydroxymidazolam <25 <25 ng/mL    Chlordiazepoxide <25 <25 ng/mL     Diazepam <25 <25 ng/mL    Nordiazepam 145 (H) <25 ng/mL    Temazepam 472 (H) <25 ng/mL    Oxazepam 274 (H) <25 ng/mL    Lorazepam <25 <25 ng/mL   Drug Screen, Urine With Reflex to Confirmation    Collection Time: 04/15/24  4:46 PM   Result Value Ref Range    Amphetamine Screen, Urine Presumptive Negative Presumptive Negative    Barbiturate Screen, Urine Presumptive Negative Presumptive Negative    Benzodiazepines Screen, Urine Presumptive Positive (A) Presumptive Negative    Cannabinoid Screen, Urine Presumptive Negative Presumptive Negative    Cocaine Metabolite Screen, Urine Presumptive Negative Presumptive Negative    Fentanyl Screen, Urine Presumptive Negative Presumptive Negative    Opiate Screen, Urine Presumptive Negative Presumptive Negative    Oxycodone Screen, Urine Presumptive Negative Presumptive Negative    PCP Screen, Urine Presumptive Negative Presumptive Negative    Methadone Screen, Urine Presumptive Negative Presumptive Negative     N/A    Clinical rationale for not completing a Urine Drug Screen: Restarting orders for drug screen put in place.      Controlled Substance Agreement:  Date of the Last Agreement: 03/23/2024    Objective   Mental Status Exam:  General Appearance: Well groomed, appropriate eye contact  Attitude/Behavior: Cooperative, Guarded, Distracted, Fair eye contact  Motor: Fidgeting  Speech: Rapid Speech, pressured, Other: (comment) (perseverative)  Gait/Station: Other:(comment) (sitting in chair in bedroom, over virtual connection)  Mood: 'ok, but frustrated'  Affect: Dysphoric, constricted, Anxious, Irritable, Congruent with mood and topic of conversation  Thought Process: Perseverative, Flight of ideas (obsessive)  Thought Associations: No loosening of associations  Thought Content: Normal, Other: (comment) (frustration with landlord and her manipulations)  Perception: No perceptual abnormalities noted  Sensorium: Alert and oriented to person, place, time and  situation  Insight: Fair  Judgement: Fair  Cognition: Cognitively intact to conversational testing with respect to attention, orientation, fund of knowledge, recent and remote memory, and language  Testing: N/A    TIM-7/PHQ-9 scores reviewed: 9, 17 compared to 8, 15 reflecting a mild increase with anxiety and depression/mood.    Current Medications:  Current Outpatient Medications on File Prior to Visit   Medication Sig Dispense Refill    acetaminophen (Tylenol) 325 mg tablet Take 2 tablets (650 mg) by mouth every 6 hours.      albuterol (Ventolin HFA) 90 mcg/actuation inhaler Inhale 2 puffs every 6 hours if needed for wheezing. 18 g 1    Biktarvy -25 mg tablet Take 1 tablet by mouth once daily. 30 tablet 1    brexpiprazole 4 mg tablet Take 4 mg by mouth once daily. 60 tablet 11    cyclobenzaprine (Flexeril) 5 mg tablet Take 1 tablet (5 mg) by mouth 3 times a day as needed for muscle spasms. 30 tablet 2    ergocalciferol (Vitamin D-2) 1.25 MG (38443 UT) capsule Take 1 capsule (1,250 mcg) by mouth 1 (one) time per week.      hydrOXYzine HCL (Atarax) 25 mg tablet Take 1 tablet (25 mg) by mouth once daily as needed for anxiety. 90 tablet 1    ibuprofen 600 mg tablet Take 1 tablet (600 mg) by mouth 3 times a day with meals. 90 tablet 2    lidocaine (Lidoderm) 5 % patch Place 1 patch on the skin every 12 hours. (Leave on for 12 hours, then remove and leave off for 12 hours)      mv-min-FA-vit K-lutein-zeaxant (PreserVision AREDS 2 Plus MV) 200 mcg-15 mcg- 5 mg-1 mg capsule Take 1 capsule by mouth 2 times a day.      naproxen (Naprosyn) 500 mg tablet Take 1 tablet (500 mg) by mouth 2 times daily (morning and late afternoon).      nystatin (Mycostatin) 100,000 unit/gram powder Apply 1 Application topically 2 times a day. 60 g 1    polyethylene glycol (Glycolax, Miralax) 17 gram/dose powder Mix 17 g of powder and drink. 17 gram/dose powder      sertraline (Zoloft) 100 mg tablet Take 2 tablets (200 mg) by mouth once  daily 180 tablet 3    topiramate (Topamax) 25 mg tablet Take 1 tablet (25 mg) by mouth. Take per titration, fax to pharmacy       No current facility-administered medications on file prior to visit.       Lab Review:   Office Visit on 10/23/2024   Component Date Value    Case Report 10/23/2024                      Value:Gynecologic Cytology                              Case: [id number]                                   Authorizing Provider:  Lakeisha Groves MD   Collected:           10/23/2024 1157              Ordering Location:     [facility]       Received:            10/23/2024 1157              First Screen:          NAEL Bee                                                           Rescreen:              NAEL Song                                                               Specimen:    ThinPrep Liquid-Based Pap-Manual Screen, ECTO/ENDOCERVIX/VAGINA, SCREENING                 Final Cytological Interp* 10/23/2024                      Value:    A. THINPREP PAP ECTO/ENDOCERVIX/VAGINA, SCREENING -     Specimen Adequacy  Satisfactory for evaluation; absence of endocervical/transformation zone component    General Categorization  Negative for intraepithelial lesion or malignancy.    Descriptive Interpretation  Negative for intraepithelial lesion or malignancy  Shift in vaginal darleen suggestive of bacterial vaginosis              10/23/2024                      Value:Slide(s) initially screened by NAEL Bee at [facility] [address] [facility] [id number]  QC review performed by NAEL Song at [facility] [facility] [id number]  By the signature on this report, the individual or group listed as making the Final Interpretation/Diagnosis certifies that they have reviewed this case.       Educational Note 10/23/2024                      Value:Cervical cytology is a screening procedure primarily for squamous cancers and precursors and has  associated false-negative and false-positives results as evidenced by published data. Your patient's test should be interpreted in this context, together with the patient's history and clinical findings. Regular sampling and follow-up of unexplained clinical signs and symptoms are recommended to minimize false negative results.      Perform HPV HR test? 10/23/2024 Always (all interpretations)     Include HPV Genotype? 10/23/2024 No     Menstrual status 10/23/2024                      Value:Hysterectomy      Previous abnormal cytolo* 10/23/2024                      Value:ASCUS      Additional Information 10/23/2024                      Value:HIV +      HPV, high-risk 10/23/2024 Negative    Office Visit on 09/06/2024   Component Date Value    POC Color, Urine 09/06/2024 Yellow     POC Appearance, Urine 09/06/2024 Clear     POC Glucose, Urine 09/06/2024 NEGATIVE     POC Bilirubin, Urine 09/06/2024 NEGATIVE     POC Ketones, Urine 09/06/2024 TRACE (A)     POC Specific Gravity, Ur* 09/06/2024 1.020     POC Blood, Urine 09/06/2024 NEGATIVE     POC PH, Urine 09/06/2024 7.0     POC Protein, Urine 09/06/2024 NEGATIVE     POC Urobilinogen, Urine 09/06/2024 1.0     Poc Nitrite, Urine 09/06/2024 NEGATIVE     POC Leukocytes, Urine 09/06/2024 NEGATIVE    Office Visit on 08/02/2024   Component Date Value    Neisseria gonorrhea,Ampl* 08/02/2024 Negative     Chlamydia trachomatis, A* 08/02/2024 Negative     Glucose 08/02/2024 76     Sodium 08/02/2024 141     Potassium 08/02/2024 3.9     Chloride 08/02/2024 104     Bicarbonate 08/02/2024 27     Anion Gap 08/02/2024 14     Urea Nitrogen 08/02/2024 11     Creatinine 08/02/2024 0.85     eGFR 08/02/2024 86     Calcium 08/02/2024 9.6     Albumin 08/02/2024 4.1     Alkaline Phosphatase 08/02/2024 73     Total Protein 08/02/2024 6.5     AST 08/02/2024 18     Bilirubin, Total 08/02/2024 0.5     ALT 08/02/2024 18     Hemoglobin A1C 08/02/2024 5.1     Estimated Average Glucose 08/02/2024 100      HIV-1 RNA PCR Viral Load* 08/02/2024      HIV RNA Result 08/02/2024 Not Detected     Cholesterol 08/02/2024 224 (H)     HDL-Cholesterol 08/02/2024 36.9     Cholesterol/HDL Ratio 08/02/2024 6.1     LDL Calculated 08/02/2024 124 (H)     VLDL 08/02/2024 63 (H)     Triglycerides 08/02/2024 317 (H)     Non HDL Cholesterol 08/02/2024 187 (H)     Syphilis Total Ab 08/02/2024 Nonreactive     Lymphocyte Absolute 08/02/2024 4.36     CD3+CD4+% 08/02/2024 42     CD3+CD4+ Absolute 08/02/2024 1.831     CD3+CD8+% 08/02/2024 40 (H)     CD3+CD8+ Absolute 08/02/2024 1.744 (H)     CD4/CD8 Ratio 08/02/2024 1.05     CD3+CD4+ Absolute (/mm3) 08/02/2024 1,831     WBC 08/02/2024 8.3     nRBC 08/02/2024 0.0     RBC 08/02/2024 4.22     Hemoglobin 08/02/2024 13.8     Hematocrit 08/02/2024 41.4     MCV 08/02/2024 98     MCH 08/02/2024 32.7     MCHC 08/02/2024 33.3     RDW 08/02/2024 11.9     Platelets 08/02/2024 348     Neutrophils % 08/02/2024 37.4     Immature Granulocytes %,* 08/02/2024 0.1     Lymphocytes % 08/02/2024 52.3     Monocytes % 08/02/2024 7.0     Eosinophils % 08/02/2024 2.6     Basophils % 08/02/2024 0.6     Neutrophils Absolute 08/02/2024 3.12     Immature Granulocytes Ab* 08/02/2024 0.01     Lymphocytes Absolute 08/02/2024 4.36     Monocytes Absolute 08/02/2024 0.58     Eosinophils Absolute 08/02/2024 0.22     Basophils Absolute 08/02/2024 0.05          Time Spent:    Prep time: 1 min.  Direct patient time: 30 min.  Documentation time: 6 min.  Total time: 37 min.    Next Appointment:  Follow up in 9 weeks (on 3/19/2025).

## 2025-01-16 ENCOUNTER — APPOINTMENT (OUTPATIENT)
Dept: OPHTHALMOLOGY | Facility: CLINIC | Age: 45
End: 2025-01-16
Payer: COMMERCIAL

## 2025-01-17 ENCOUNTER — SOCIAL WORK (OUTPATIENT)
Dept: IMMUNOLOGY | Facility: CLINIC | Age: 45
End: 2025-01-17
Payer: MEDICAID

## 2025-01-17 NOTE — PROGRESS NOTES
[facility]     Mental Health Counseling Session Update - Protected Health Information           Date:   1-17-25           In person session with Ms. Chapa.                  Total  Time:   45 min         Progress:  Good    /  Fair                                                                                                                       Compliant with Psych. Meds:  Yes       Therapeutic Modality:  Supportive               DBT                                                        Factors regarding Medical treatment:   Multiple medical/ physical concerns/ limitations.                                                                                    Changes (if any) to Initial Assessment:                                                                             Treatment Modality:  Individual    Suicidal/ Homicidal Concerns:  No     Factors aiding or impeding psychotherapy progress:   came     Processed client's thoughts, feelings, and actions.  Focused on distress tolerance, interpersonal effectiveness, and emotion regulation.  Looked at perceptions, assumptions, expectations, and realistic outcomes.  Talked about how difficult it is to change/ interrupt long standing survival skills/ patterns/ dynamics.  Looked at establishing and maintaining limits/ boundaries despite push-back.  Discussed mental health and physical health connectedness.  Explored ideas, goals, choices, awareness, discernment, and problem-solving.  Encouraged re-engaging in self-care routines, exercise, and mindfulness.  Reflected on client's life experiences and progress.  Provided support and empathetic understanding.                                                       Next Session:    1-21-25     Kera Landry [facility]  Mental Health Counselor

## 2025-01-21 ENCOUNTER — TELEPHONE (OUTPATIENT)
Dept: IMMUNOLOGY | Facility: CLINIC | Age: 45
End: 2025-01-21
Payer: MEDICAID

## 2025-01-21 NOTE — TELEPHONE ENCOUNTER
1-21-25    Ms. Chapa called and cancelled today's scheduled session. She rescheduled it for 1-22-25.    Kera Landry, [facility]  Mental Health Counselor

## 2025-01-22 ENCOUNTER — SOCIAL WORK (OUTPATIENT)
Dept: IMMUNOLOGY | Facility: CLINIC | Age: 45
End: 2025-01-22
Payer: MEDICAID

## 2025-01-22 NOTE — PROGRESS NOTES
[facility]     Mental Health Counseling Session Update - Protected Health Information           Date:   1-22-25           In person session with Ms. Chapa.                  Total  Time:   60 min         Progress:  Good                  /       Fair                                                                                                                       Compliant with Psych. Meds:  Yes       Therapeutic Modality:  Supportive               DBT                                                        Factors regarding Medical treatment:   Multiple medical/ physical concerns/ limitations.                                                                                    Changes (if any) to Initial Assessment:                                                                             Treatment Modality:  Individual    Suicidal/ Homicidal Concerns:  No     Factors aiding or impeding psychotherapy progress:   came     Processed client's thoughts, feelings, and actions.  Focused on distress tolerance, interpersonal effectiveness, and emotion regulation.  Looked at perceptions, assumptions, expectations, and realistic outcomes.  Talked about how difficult it is to change/ interrupt long standing survival skills/ patterns/ dynamics.  Looked at establishing and maintaining limits/ boundaries despite push-back.  Discussed mental health and physical health connectedness.  Explored ideas, goals, choices, awareness, discernment, and problem-solving.  Encouraged re-engaging in self-care routines, exercise, and mindfulness.  Reflected on client's life experiences and noted progress.  Provided support and empathetic understanding.                                                       Next Session:    1-28-25     Kera Landry [facility]  Mental Health Counselor

## 2025-01-28 ENCOUNTER — TELEPHONE (OUTPATIENT)
Dept: IMMUNOLOGY | Facility: CLINIC | Age: 45
End: 2025-01-28
Payer: MEDICAID

## 2025-01-28 DIAGNOSIS — E78.5 HYPERLIPIDEMIA, UNSPECIFIED HYPERLIPIDEMIA TYPE: ICD-10-CM

## 2025-01-28 DIAGNOSIS — Z11.3 ROUTINE SCREENING FOR STI (SEXUALLY TRANSMITTED INFECTION): ICD-10-CM

## 2025-01-28 DIAGNOSIS — Z21 HIV INFECTION, UNSPECIFIED SYMPTOM STATUS: ICD-10-CM

## 2025-01-28 DIAGNOSIS — E66.9 OBESITY (BMI 30-39.9): ICD-10-CM

## 2025-01-28 DIAGNOSIS — Z79.899 HIGH RISK MEDICATION USE: ICD-10-CM

## 2025-01-28 DIAGNOSIS — E11.9 TYPE 2 DIABETES MELLITUS WITHOUT COMPLICATION, WITHOUT LONG-TERM CURRENT USE OF INSULIN (MULTI): ICD-10-CM

## 2025-01-28 NOTE — TELEPHONE ENCOUNTER
1-28-25    Voice mail received from Ms. Chapa.  Returned her call and spoke with client.  She cancelled today's scheduled session and rescheduled it for 1-31-25.      Kera Landry, [facility]  Mental Health Counselor

## 2025-01-30 ENCOUNTER — TELEPHONE (OUTPATIENT)
Dept: IMMUNOLOGY | Facility: CLINIC | Age: 45
End: 2025-01-30
Payer: MEDICAID

## 2025-01-30 NOTE — TELEPHONE ENCOUNTER
Pt emailed CHARLEEN ALMEIDA regarding insurance change.    CHARLEEN called pt. Per pt, she receives SSDI and would have a change in insurance. Pt stated that [employer] Medicare Dual Advantage HMO plan would start on 2/1/2025. Pt mentioned that she applied through phone and that most of her providers are in network, but the ins rep could not find Kera or Dr. Yu. SW checked  in network ins list and let pt know that [facility].    SW emailed pt so that pt has SW's email address. Pt plans on sending ins card when she receives it.    Pt mentioned that she would like to switch HCM agency (from [facility] to [facility]). Pt gave SW permission to make a referral. Pt understood that she could not have services at 2 agencies for the same services.    SW called [employer] provider line to check on in-network insurance.  Rep was able to find pt's file. However, pt is under Medicaid right now and he could not find plan information or in-network providers info until pt is activated.

## 2025-01-31 ENCOUNTER — TELEPHONE (OUTPATIENT)
Dept: IMMUNOLOGY | Facility: CLINIC | Age: 45
End: 2025-01-31
Payer: MEDICAID

## 2025-01-31 NOTE — TELEPHONE ENCOUNTER
1-31-25    Ms. Chapa did not log into the virtual telehealth platform link for today's scheduled telehealth session. Two phone attempts made, voice messages left for client.      Kera Landry, [facility]  Mental Health Counselor

## 2025-02-04 ENCOUNTER — SOCIAL WORK (OUTPATIENT)
Dept: IMMUNOLOGY | Facility: CLINIC | Age: 45
End: 2025-02-04
Payer: MEDICAID

## 2025-02-04 NOTE — PROGRESS NOTES
[facility]     Mental Health Counseling Session Update - Protected Health Information           Date:   2-4-25           Telehealth phone session with Ms. Chapa.                  Total  Time:   15 min         Progress:  Good                  /       Fair                                                                                                                       Compliant with Psych. Meds:  Yes       Therapeutic Modality:  Supportive                                                                   Factors regarding Medical treatment:   Multiple medical/ physical concerns/ limitations.                                                                                    Changes (if any) to Initial Assessment:                                                                             Treatment Modality:  Individual    Suicidal/ Homicidal Concerns:  No     Factors aiding or impeding psychotherapy progress:   came     Processed client's thoughts, feelings, and actions.  Looked at perceptions, assumptions, expectations, and realistic outcomes.  Looked at establishing and maintaining limits/ boundaries despite push-back.  Discussed mental health and physical health connectedness.  Explored ideas, goals, choices, awareness, discernment, and problem-solving.  Encouraged re-engaging in self-care routines, exercise, and mindfulness.  Reflected on client's life experiences and noted progress.  Provided support and empathetic understanding.                                                       Next Session:    2-5-25     Kera Landry [facility]  Mental Health Counselor

## 2025-02-05 ENCOUNTER — SOCIAL WORK (OUTPATIENT)
Dept: IMMUNOLOGY | Facility: CLINIC | Age: 45
End: 2025-02-05
Payer: MEDICAID

## 2025-02-05 NOTE — PROGRESS NOTES
[facility]     Mental Health Counseling Session Update - Protected Health Information           Date:   2-5-25           Telehealth phone session with Ms. Chapa.                  Total  Time:   15 min         Progress:  Good                  /       Fair                                                                                                                       Compliant with Psych. Meds:  Yes       Therapeutic Modality:  Supportive                                                                   Factors regarding Medical treatment:   Multiple medical/ physical concerns/ limitations.                                                                                    Changes (if any) to Initial Assessment:                                                                             Treatment Modality:  Individual    Suicidal/ Homicidal Concerns:  No     Factors aiding or impeding psychotherapy progress:   came     Processed client's thoughts, feelings, and actions.  Looked at perceptions, assumptions, expectations, and realistic outcomes.  Looked at establishing and maintaining limits/ boundaries despite push-back.  Discussed mental health and physical health connectedness.  Explored ideas, goals, choices, awareness, discernment, and problem-solving.  Encouraged re-engaging in self-care routines, exercise, and mindfulness.  Reflected on client's life experiences and noted progress.  Provided support and empathetic understanding.                                                       Next Session:    2-10-25     Kera Landry [facility]  Mental Health Counselor

## 2025-02-07 ENCOUNTER — OFFICE VISIT (OUTPATIENT)
Dept: IMMUNOLOGY | Facility: CLINIC | Age: 45
End: 2025-02-07
Payer: MEDICAID

## 2025-02-07 VITALS
HEART RATE: 61 BPM | WEIGHT: 194 LBS | TEMPERATURE: 97.9 F | OXYGEN SATURATION: 100 % | BODY MASS INDEX: 34.38 KG/M2 | RESPIRATION RATE: 16 BRPM | HEIGHT: 63 IN | SYSTOLIC BLOOD PRESSURE: 95 MMHG | DIASTOLIC BLOOD PRESSURE: 46 MMHG

## 2025-02-07 DIAGNOSIS — E11.9 TYPE 2 DIABETES MELLITUS WITHOUT COMPLICATION, WITHOUT LONG-TERM CURRENT USE OF INSULIN (MULTI): ICD-10-CM

## 2025-02-07 DIAGNOSIS — E78.5 DYSLIPIDEMIA: Primary | ICD-10-CM

## 2025-02-07 DIAGNOSIS — E78.5 HYPERLIPIDEMIA, UNSPECIFIED HYPERLIPIDEMIA TYPE: ICD-10-CM

## 2025-02-07 DIAGNOSIS — E66.9 OBESITY (BMI 30-39.9): ICD-10-CM

## 2025-02-07 DIAGNOSIS — Z79.899 HIGH RISK MEDICATION USE: ICD-10-CM

## 2025-02-07 DIAGNOSIS — Z21 HIV INFECTION, UNSPECIFIED SYMPTOM STATUS: ICD-10-CM

## 2025-02-07 DIAGNOSIS — Z11.3 ROUTINE SCREENING FOR STI (SEXUALLY TRANSMITTED INFECTION): ICD-10-CM

## 2025-02-07 DIAGNOSIS — Z23 NEED FOR VACCINATION: ICD-10-CM

## 2025-02-07 DIAGNOSIS — E66.9 OBESITY, UNSPECIFIED CLASS, UNSPECIFIED OBESITY TYPE, UNSPECIFIED WHETHER SERIOUS COMORBIDITY PRESENT: Primary | ICD-10-CM

## 2025-02-07 LAB
ALBUMIN SERPL BCP-MCNC: 4.4 G/DL (ref 3.4–5)
ALP SERPL-CCNC: 69 U/L (ref 33–110)
ALT SERPL W P-5'-P-CCNC: 19 U/L (ref 7–45)
ANION GAP SERPL CALC-SCNC: 15 MMOL/L (ref 10–20)
AST SERPL W P-5'-P-CCNC: 17 U/L (ref 9–39)
BASOPHILS # BLD AUTO: 0.04 X10*3/UL (ref 0–0.1)
BASOPHILS NFR BLD AUTO: 0.5 %
BILIRUB SERPL-MCNC: 0.6 MG/DL (ref 0–1.2)
BUN SERPL-MCNC: 14 MG/DL (ref 6–23)
CALCIUM SERPL-MCNC: 9.9 MG/DL (ref 8.6–10.6)
CD3+CD4+ CELLS # BLD: 1.7 X10E9/L
CD3+CD4+ CELLS # BLD: 1697 /MM3
CD3+CD4+ CELLS NFR BLD: 46 %
CD3+CD4+ CELLS/CD3+CD8+ CLL BLD: 1.15 %
CD3+CD8+ CELLS # BLD: 1.48 X10E9/L
CD3+CD8+ CELLS NFR BLD: 40 %
CHLORIDE SERPL-SCNC: 104 MMOL/L (ref 98–107)
CHOLEST SERPL-MCNC: 243 MG/DL (ref 0–199)
CHOLESTEROL/HDL RATIO: 5.6
CO2 SERPL-SCNC: 25 MMOL/L (ref 21–32)
CREAT SERPL-MCNC: 0.81 MG/DL (ref 0.5–1.05)
EGFRCR SERPLBLD CKD-EPI 2021: >90 ML/MIN/1.73M*2
EOSINOPHIL # BLD AUTO: 0.14 X10*3/UL (ref 0–0.7)
EOSINOPHIL NFR BLD AUTO: 1.7 %
ERYTHROCYTE [DISTWIDTH] IN BLOOD BY AUTOMATED COUNT: 12.4 % (ref 11.5–14.5)
EST. AVERAGE GLUCOSE BLD GHB EST-MCNC: 105 MG/DL
GLUCOSE SERPL-MCNC: 79 MG/DL (ref 74–99)
HBA1C MFR BLD: 5.3 %
HCT VFR BLD AUTO: 42.5 % (ref 36–46)
HDLC SERPL-MCNC: 43.2 MG/DL
HGB BLD-MCNC: 13.8 G/DL (ref 12–16)
IMM GRANULOCYTES # BLD AUTO: 0.02 X10*3/UL (ref 0–0.7)
IMM GRANULOCYTES NFR BLD AUTO: 0.2 % (ref 0–0.9)
LDLC SERPL CALC-MCNC: 146 MG/DL
LYMPHOCYTES # BLD AUTO: 3.69 X10*3/UL (ref 1.2–4.8)
LYMPHOCYTES # SPEC AUTO: 3.69 X10*3/UL
LYMPHOCYTES NFR BLD AUTO: 44 %
MCH RBC QN AUTO: 31.9 PG (ref 26–34)
MCHC RBC AUTO-ENTMCNC: 32.5 G/DL (ref 32–36)
MCV RBC AUTO: 98 FL (ref 80–100)
MONOCYTES # BLD AUTO: 0.82 X10*3/UL (ref 0.1–1)
MONOCYTES NFR BLD AUTO: 9.8 %
NEUTROPHILS # BLD AUTO: 3.67 X10*3/UL (ref 1.2–7.7)
NEUTROPHILS NFR BLD AUTO: 43.8 %
NON HDL CHOLESTEROL: 200 MG/DL (ref 0–149)
NRBC BLD-RTO: 0 /100 WBCS (ref 0–0)
PLATELET # BLD AUTO: 306 X10*3/UL (ref 150–450)
POTASSIUM SERPL-SCNC: 4 MMOL/L (ref 3.5–5.3)
PROT SERPL-MCNC: 7 G/DL (ref 6.4–8.2)
RBC # BLD AUTO: 4.32 X10*6/UL (ref 4–5.2)
SODIUM SERPL-SCNC: 140 MMOL/L (ref 136–145)
TREPONEMA PALLIDUM IGG+IGM AB [PRESENCE] IN SERUM OR PLASMA BY IMMUNOASSAY: REACTIVE
TRIGL SERPL-MCNC: 270 MG/DL (ref 0–149)
VLDL: 54 MG/DL (ref 0–40)
WBC # BLD AUTO: 8.4 X10*3/UL (ref 4.4–11.3)

## 2025-02-07 PROCEDURE — 86780 TREPONEMA PALLIDUM: CPT | Performed by: INTERNAL MEDICINE

## 2025-02-07 PROCEDURE — 84075 ASSAY ALKALINE PHOSPHATASE: CPT | Performed by: INTERNAL MEDICINE

## 2025-02-07 PROCEDURE — 87661 TRICHOMONAS VAGINALIS AMPLIF: CPT | Performed by: INTERNAL MEDICINE

## 2025-02-07 PROCEDURE — 88185 FLOWCYTOMETRY/TC ADD-ON: CPT | Performed by: INTERNAL MEDICINE

## 2025-02-07 PROCEDURE — 87491 CHLMYD TRACH DNA AMP PROBE: CPT | Performed by: INTERNAL MEDICINE

## 2025-02-07 PROCEDURE — 80061 LIPID PANEL: CPT | Performed by: INTERNAL MEDICINE

## 2025-02-07 PROCEDURE — 36415 COLL VENOUS BLD VENIPUNCTURE: CPT | Performed by: INTERNAL MEDICINE

## 2025-02-07 PROCEDURE — 87591 N.GONORRHOEAE DNA AMP PROB: CPT | Performed by: INTERNAL MEDICINE

## 2025-02-07 PROCEDURE — 90656 IIV3 VACC NO PRSV 0.5 ML IM: CPT | Performed by: INTERNAL MEDICINE

## 2025-02-07 PROCEDURE — 99214 OFFICE O/P EST MOD 30 MIN: CPT | Mod: 25 | Performed by: INTERNAL MEDICINE

## 2025-02-07 PROCEDURE — 86318 IA INFECTIOUS AGENT ANTIBODY: CPT | Performed by: INTERNAL MEDICINE

## 2025-02-07 PROCEDURE — 83036 HEMOGLOBIN GLYCOSYLATED A1C: CPT | Performed by: INTERNAL MEDICINE

## 2025-02-07 PROCEDURE — 87536 HIV-1 QUANT&REVRSE TRNSCRPJ: CPT | Performed by: INTERNAL MEDICINE

## 2025-02-07 PROCEDURE — 85025 COMPLETE CBC W/AUTO DIFF WBC: CPT | Performed by: INTERNAL MEDICINE

## 2025-02-07 ASSESSMENT — PAIN SCALES - GENERAL: PAINLEVEL_OUTOF10: 0-NO PAIN

## 2025-02-07 NOTE — LETTER
02/12/25    Ted Ramon DO  Office Address Unavailable  As Of 07/01/2023      Dear Dr. Ted Ramon DO,    I am writing to confirm that your patient, Cherrie Chapa, received care in my office on 02/12/25. I have enclosed a summary of the care provided to Cherrie for your reference.    Please contact me with any questions you may have regarding the visit.    Sincerely,         Marcela Yu MD  [address]  [place] [address] [phone]    CC: No Recipients
intermittently agitated

## 2025-02-08 LAB
C TRACH RRNA SPEC QL NAA+PROBE: NEGATIVE
N GONORRHOEA DNA SPEC QL PROBE+SIG AMP: NEGATIVE
RPR SER QL: NONREACTIVE
T VAGINALIS RRNA SPEC QL NAA+PROBE: NEGATIVE

## 2025-02-10 LAB — T PALLIDUM AB SER QL AGGL: NONREACTIVE

## 2025-02-10 NOTE — TELEPHONE ENCOUNTER
2-10-25    Voice mail received from Ms. Chapa. She cancelled today's scheduled session.   Return voice mail left for client. Provided session date/time rescheduling options.    Kera Landry, [facility]  Mental Health Counselor

## 2025-02-12 ENCOUNTER — SOCIAL WORK (OUTPATIENT)
Dept: IMMUNOLOGY | Facility: CLINIC | Age: 45
End: 2025-02-12
Payer: MEDICAID

## 2025-02-12 RX ORDER — BICTEGRAVIR SODIUM, EMTRICITABINE, AND TENOFOVIR ALAFENAMIDE FUMARATE 50; 200; 25 MG/1; MG/1; MG/1
1 TABLET ORAL DAILY
Qty: 30 TABLET | Refills: 0 | Status: SHIPPED | OUTPATIENT
Start: 2025-02-12 | End: 2025-02-13 | Stop reason: SDUPTHER

## 2025-02-12 NOTE — PROGRESS NOTES
"Subjective   Patient ID: Cherrie Chapa is a 44 y.o. female who presents for follow up HIV infection  Taking meds with good adherence, no missed doses. No intolerance or concerns for adverse effects. Fibromyalgia pains stable, seeing [facility]ologist  Since seen, no exposure to illnesses or infections.   No intercurrent illnesses or physician's visit  No smoking, no excessive alcohol or other drugs  No symptoms of depression, has good support from family and friends  Works full time     ROS: All systems checked and are negative except for what is noted in HPI    Objective     Visit Vitals  BP (!) 95/46 (BP Location: Left arm, Patient Position: Sitting, BP Cuff Size: Large adult)   Pulse 61   Temp 36.6 °C (97.9 °F) (Skin)   Resp 16   Ht 1.6 m (5' 3\")   Wt 88 kg (194 lb)   LMP 03/13/2021   SpO2 100%   BMI 34.37 kg/m²   OB Status Hysterectomy   Smoking Status Former   BSA 1.98 m²       Vital signs reviewed  Alert, oriented, no apparent cognitive concerns  HEENT clear  PEERLA  Lungs clear bilaterally  Heart S1S2 regular, no murmur or gallop  Abdomen soft non tender, no masses and no organomegaly  Joints no tenderness, no LOM, no swelling or redness  Neuro exam: no weakness, no sensitive deficit, reflexes normal, cognitive normal  Skin no rash, no redness or lesions    .  Lab Results   Component Value Date    [id number] NOT DETECTED 09/28/2023      .  Lab Results   Component Value Date    WBC 8.4 02/07/2025    HGB 13.8 02/07/2025    HCT 42.5 02/07/2025     02/07/2025    CHOL 243 (H) 02/07/2025    TRIG 270 (H) 02/07/2025    HDL 43.2 02/07/2025    ALT 19 02/07/2025    AST 17 02/07/2025     02/07/2025    K 4.0 02/07/2025     02/07/2025    CREATININE 0.81 02/07/2025    BUN 14 02/07/2025    CO2 25 02/07/2025    TSH 1.88 01/25/2021    INR 1.0 04/01/2021    HGBA1C 5.3 02/07/2025         Assessment/Plan   Problem List Items Addressed This Visit             ICD-10-CM    HIV infection Z21    Hyperlipidemia " E78.5    High risk medication use Z79.899     Other Visit Diagnoses         Codes    Routine screening for STI (sexually transmitted infection)     Z11.3    Relevant Orders    RPR (Completed)     Syphilis Screening With TPPA Reflex (Completed)    Type 2 diabetes mellitus without complication, without long-term current use of insulin (Multi)     E11.9    Obesity (BMI 30-39.9)     E66.9    Need for vaccination     Z23    Relevant Orders    Flu vaccine, trivalent, preservative free, age 6 months and greater (Fluarix/Fluzone/Flulaval) (Completed)            HIV: well controlled clinically, last labs reviewed with patient, viral load undetectable, toxicity labs reviewed, no concerns  Labs in 6 months unless any changes or new concerns, including CD4, viral load, and toxicity labs    Dyslipidemias: fasting lipid profile on next visit, need a atorvastatin 20 mg daily-for CVD risk    Obesity, discussed risk including cardiovascular risk and diabetes risk, dietician to see today, diet and physical activity reviewed with pt who voiced understanding risk and willingness to make changes. Will have dietician follow up with pt regularly and monitor weight    Depression/anxiety: controlled on meds, denies any suicidal ideation    Health maintenance: reviewed weight, diet and exercise,  STI screens, partner notification laws and safe sex. Needs flu vaccine today    Marcela Yu MD

## 2025-02-12 NOTE — PROGRESS NOTES
[facility]     Mental Health Counseling Session Update - Protected Health Information           Date:   2-12-25           Telehealth virtual session with Ms. Chapa.                  Total  Time:   60 min         Progress:  Good                  /       Fair                                                                                                                       Compliant with Psych. Meds:  Yes       Therapeutic Modality:  Supportive                                                                   Factors regarding Medical treatment:   Multiple medical/ physical concerns/ limitations.                                                                                    Changes (if any) to Initial Assessment:                                                                             Treatment Modality:  Individual    Suicidal/ Homicidal Concerns:  No     Factors aiding or impeding psychotherapy progress:   came     Processed client's thoughts, feelings, and actions.  Looked at perceptions, assumptions, expectations, and realistic outcomes.  Talked about establishing and maintaining limits/ boundaries despite push-back.  Examined thought, behavioral, emotional, and relationship patterns.  Discussed mental health and physical health connectedness.  Explored ideas, goals, choices, awareness, discernment, and problem-solving.  Encouraged re-engaging in self-care routines, exercise, and mindfulness.  Reflected on client's life experiences and noted progress.  Provided support and empathetic understanding.                                                       Next Session:    2-19-25     Kera Landry [facility]  Mental Health Counselor

## 2025-02-13 NOTE — TELEPHONE ENCOUNTER
MD contacted RN as it looked like patient's Biktarvy was not on her med profile.  Med just needed refills.  Also, patient to start on Atorvastatin 20mg PO daily.  Patient not surprised as she saw that her cholesterol level was elevated.  Both scripts sent to [facility] pharmacy per patient request.

## 2025-02-14 RX ORDER — ATORVASTATIN CALCIUM 20 MG/1
20 TABLET, FILM COATED ORAL DAILY
Qty: 90 TABLET | Refills: 0 | Status: SHIPPED | OUTPATIENT
Start: 2025-02-14 | End: 2025-02-19

## 2025-02-14 RX ORDER — BICTEGRAVIR SODIUM, EMTRICITABINE, AND TENOFOVIR ALAFENAMIDE FUMARATE 50; 200; 25 MG/1; MG/1; MG/1
1 TABLET ORAL DAILY
Qty: 30 TABLET | Refills: 0 | Status: SHIPPED | OUTPATIENT
Start: 2025-02-14 | End: 2025-02-20 | Stop reason: SDUPTHER

## 2025-02-18 DIAGNOSIS — E78.5 DYSLIPIDEMIA: ICD-10-CM

## 2025-02-18 NOTE — TELEPHONE ENCOUNTER
2-18-25    Called and spoke with Ms. Chapa. Switched tomorrow's session to a telehealth appointment.    Kera Landry, [facility]  Mental Health Counselor

## 2025-02-19 ENCOUNTER — SOCIAL WORK (OUTPATIENT)
Dept: IMMUNOLOGY | Facility: CLINIC | Age: 45
End: 2025-02-19
Payer: MEDICAID

## 2025-02-19 RX ORDER — ATORVASTATIN CALCIUM 20 MG/1
20 TABLET, FILM COATED ORAL DAILY
Qty: 90 TABLET | Refills: 1 | Status: SHIPPED | OUTPATIENT
Start: 2025-02-19

## 2025-02-19 NOTE — PROGRESS NOTES
[facility]     Mental Health Counseling Session Update - Protected Health Information           Date:   2-19-25           Telehealth virtual session with Ms. Chapa.                  Total  Time:   60 min         Progress:  Good                  /       Fair                                                                                                                       Compliant with Psych. Meds:  Yes       Therapeutic Modality:  Supportive                                                                   Factors regarding Medical treatment:   Multiple medical/ physical concerns/ limitations.                                                                                    Changes (if any) to Initial Assessment:                                                                             Treatment Modality:  Individual    Suicidal/ Homicidal Concerns:  No     Factors aiding or impeding psychotherapy progress:   came     Processed client's thoughts, feelings, and actions.  Looked at perceptions, assumptions, expectations, and realistic outcomes.  Talked about establishing and maintaining limits/ boundaries despite push-back.  Examined thought, behavioral, emotional, and relationship patterns.  Discussed mental health and physical health connectedness.  Explored ideas, goals, choices, awareness, discernment, and problem-solving.  Encouraged re-engaging in self-care routines, exercise, and mindfulness.  Reflected on client's life experiences and noted progress.  Provided support and empathetic understanding.                                                       Next Session:    2-26-25     Kera Landry [facility]  Mental Health Counselor

## 2025-02-20 RX ORDER — BICTEGRAVIR SODIUM, EMTRICITABINE, AND TENOFOVIR ALAFENAMIDE FUMARATE 50; 200; 25 MG/1; MG/1; MG/1
1 TABLET ORAL DAILY
Qty: 30 TABLET | Refills: 5 | Status: SHIPPED | OUTPATIENT
Start: 2025-02-20

## 2025-02-26 ENCOUNTER — SOCIAL WORK (OUTPATIENT)
Dept: IMMUNOLOGY | Facility: CLINIC | Age: 45
End: 2025-02-26
Payer: MEDICAID

## 2025-02-26 NOTE — PROGRESS NOTES
[facility]     Mental Health Counseling Session Update - Protected Health Information           Date:   2-26-25           Telehealth virtual session with Ms. Chapa.                  Total  Time:   60 min         Progress:  Good                  /       Fair                                                                                                                       Compliant with Psych. Meds:  Yes       Therapeutic Modality:  Supportive                                                                   Factors regarding Medical treatment:   Multiple medical/ physical concerns/ limitations.                                                                                    Changes (if any) to Initial Assessment:                                                                             Treatment Modality:  Individual    Suicidal/ Homicidal Concerns:  No     Factors aiding or impeding psychotherapy progress:   came     Processed client's thoughts, feelings, and actions.  Looked at perceptions, assumptions, expectations, and realistic outcomes.  Talked about establishing and maintaining limits/ boundaries despite push-back.  Examined thought, behavioral, emotional, and relationship patterns.  Discussed mental health and physical health connectedness.  Explored ideas, goals, choices, awareness, discernment, and problem-solving.  Talked about establishing and maintaining limits/ boundaries despite push-back.  Encouraged re-engaging in self-care routines, exercise, and mindfulness.  Reflected on client's life experiences and noted progress.  Provided support and empathetic understanding.                                                       Next Session:    3-5-25     Kera Landry [facility]  Mental Health Counselor

## 2025-02-27 ENCOUNTER — TELEPHONE (OUTPATIENT)
Dept: IMMUNOLOGY | Facility: CLINIC | Age: 45
End: 2025-02-27
Payer: MEDICAID

## 2025-02-27 NOTE — TELEPHONE ENCOUNTER
2-27-25      Voice mail left for Ms. Chapa, regarding switching her upcoming session time/ date.    Kera Landry, [facility]  Mental Health Counselor

## 2025-02-28 ENCOUNTER — TELEPHONE (OUTPATIENT)
Dept: IMMUNOLOGY | Facility: CLINIC | Age: 45
End: 2025-02-28
Payer: MEDICAID

## 2025-02-28 NOTE — TELEPHONE ENCOUNTER
2-28-25    Called and spoke with Ms. Chapa. Switched the time of her upcoming session on 3-5-25.      Kera Landry, [facility]  Mental Health Counselor

## 2025-03-05 ENCOUNTER — TELEPHONE (OUTPATIENT)
Dept: IMMUNOLOGY | Facility: CLINIC | Age: 45
End: 2025-03-05
Payer: MEDICAID

## 2025-03-05 NOTE — TELEPHONE ENCOUNTER
3-5-25    Ms. Chapa did not come in for today's scheduled session. Two phone attempts made, voice messages left for client.      Kera Landry, [facility]  Mental Health Counselor

## 2025-03-06 ENCOUNTER — TELEPHONE (OUTPATIENT)
Dept: IMMUNOLOGY | Facility: CLINIC | Age: 45
End: 2025-03-06
Payer: MEDICAID

## 2025-03-06 NOTE — TELEPHONE ENCOUNTER
3-6-25      Voice mail left for Ms. Chapa. Provided session time/date rescheduling options.    Kera Landry [facility]  Mental Health Counselor

## 2025-03-07 ENCOUNTER — TELEPHONE (OUTPATIENT)
Dept: IMMUNOLOGY | Facility: CLINIC | Age: 45
End: 2025-03-07
Payer: MEDICAID

## 2025-03-07 NOTE — TELEPHONE ENCOUNTER
3-7-25    Voice mail received from Ms. Chapa. Return voice mail left for client. Provided session rescheduling date/time options.    Kera Landry, [facility]  Mental Health Counselor

## 2025-03-10 ENCOUNTER — TELEPHONE (OUTPATIENT)
Dept: IMMUNOLOGY | Facility: CLINIC | Age: 45
End: 2025-03-10
Payer: MEDICARE

## 2025-03-10 NOTE — TELEPHONE ENCOUNTER
3-10-25    Voice message received from Ms. Chapa. Returned her call and confirmed her counseling appointment on 3-12-25.    Kera Landry, [facility]  Mental Health Counselor

## 2025-03-11 ENCOUNTER — SOCIAL WORK (OUTPATIENT)
Dept: IMMUNOLOGY | Facility: CLINIC | Age: 45
End: 2025-03-11
Payer: MEDICARE

## 2025-03-11 NOTE — PROGRESS NOTES
[facility]     Mental Health Counseling Session Update - Protected Health Information           Date:   3-11-25           In person session with Ms. Chapa.                  Total  Time:   60 min         Progress:  Good                  /       Fair                                                                                                                       Compliant with Psych. Meds:  Yes       Therapeutic Modality:  Supportive                                                                   Factors regarding Medical treatment:   Multiple medical/ physical concerns/ limitations.                                                                                    Changes (if any) to Initial Assessment:                                                                             Treatment Modality:  Individual    Suicidal/ Homicidal Concerns:  No     Factors aiding or impeding psychotherapy progress:   came     Processed client's thoughts, feelings, and actions.  Looked at perceptions, assumptions, expectations, and realistic outcomes.  Talked about establishing and maintaining limits/ boundaries despite push-back.  Examined thought, behavioral, emotional, and relationship patterns.  Discussed mental health and physical health connectedness.  Explored ideas, goals, choices, awareness, discernment, and problem-solving.  Talked about establishing and maintaining limits/ boundaries despite push-back.  Encouraged re-engaging in self-care routines, exercise, and mindfulness.  Reflected on client's life experiences and noted progress.  Provided support and empathetic understanding.                                                       Next Session:    3-19-25     Kera Landry [facility]  Mental Health Counselor

## 2025-03-18 ENCOUNTER — TELEPHONE (OUTPATIENT)
Dept: IMMUNOLOGY | Facility: CLINIC | Age: 45
End: 2025-03-18
Payer: MEDICARE

## 2025-03-18 NOTE — TELEPHONE ENCOUNTER
3-18-25    Client's son called and confirmed his mother's counseling appointment on  3-19-25.    Kera Landry, [facility]  Mental Health Counselor

## 2025-03-19 ENCOUNTER — APPOINTMENT (OUTPATIENT)
Dept: BEHAVIORAL HEALTH | Facility: CLINIC | Age: 45
End: 2025-03-19
Payer: MEDICAID

## 2025-03-19 ENCOUNTER — TELEPHONE (OUTPATIENT)
Dept: IMMUNOLOGY | Facility: CLINIC | Age: 45
End: 2025-03-19

## 2025-03-19 DIAGNOSIS — F42.8 OBSESSIVE THINKING: ICD-10-CM

## 2025-03-19 DIAGNOSIS — F43.10 PTSD (POST-TRAUMATIC STRESS DISORDER): ICD-10-CM

## 2025-03-19 DIAGNOSIS — F41.1 GAD (GENERALIZED ANXIETY DISORDER): Primary | ICD-10-CM

## 2025-03-19 DIAGNOSIS — F31.81 BIPOLAR 2 DISORDER (MULTI): ICD-10-CM

## 2025-03-19 DIAGNOSIS — R45.4 IRRITABILITY AND ANGER: ICD-10-CM

## 2025-03-19 DIAGNOSIS — F51.04 PSYCHOPHYSIOLOGICAL INSOMNIA: ICD-10-CM

## 2025-03-19 PROCEDURE — RXMED WILLOW AMBULATORY MEDICATION CHARGE

## 2025-03-19 RX ORDER — PROPOFOL 10 MG/ML
5-20 INJECTION, EMULSION INTRAVENOUS CONTINUOUS
COMMUNITY

## 2025-03-19 RX ORDER — HYDROXYZINE HYDROCHLORIDE 25 MG/1
25 TABLET, FILM COATED ORAL DAILY PRN
Qty: 90 TABLET | Refills: 3 | Status: SHIPPED | OUTPATIENT
Start: 2025-03-19 | End: 2026-03-19

## 2025-03-19 RX ORDER — KETOROLAC TROMETHAMINE 30 MG/ML
30 INJECTION, SOLUTION INTRAMUSCULAR; INTRAVENOUS ONCE
COMMUNITY

## 2025-03-19 ASSESSMENT — PATIENT HEALTH QUESTIONNAIRE - PHQ9
10. IF YOU CHECKED OFF ANY PROBLEMS, HOW DIFFICULT HAVE THESE PROBLEMS MADE IT FOR YOU TO DO YOUR WORK, TAKE CARE OF THINGS AT HOME, OR GET ALONG WITH OTHER PEOPLE: VERY DIFFICULT
2. FEELING DOWN, DEPRESSED OR HOPELESS: SEVERAL DAYS
1. LITTLE INTEREST OR PLEASURE IN DOING THINGS: MORE THAN HALF THE DAYS
3. TROUBLE FALLING OR STAYING ASLEEP OR SLEEPING TOO MUCH: SEVERAL DAYS
8. MOVING OR SPEAKING SO SLOWLY THAT OTHER PEOPLE COULD HAVE NOTICED. OR THE OPPOSITE, BEING SO FIGETY OR RESTLESS THAT YOU HAVE BEEN MOVING AROUND A LOT MORE THAN USUAL: NOT AT ALL
6. FEELING BAD ABOUT YOURSELF - OR THAT YOU ARE A FAILURE OR HAVE LET YOURSELF OR YOUR FAMILY DOWN: MORE THAN HALF THE DAYS
7. TROUBLE CONCENTRATING ON THINGS, SUCH AS READING THE NEWSPAPER OR WATCHING TELEVISION: NEARLY EVERY DAY
9. THOUGHTS THAT YOU WOULD BE BETTER OFF DEAD, OR OF HURTING YOURSELF: NOT AT ALL
5. POOR APPETITE OR OVEREATING: SEVERAL DAYS
4. FEELING TIRED OR HAVING LITTLE ENERGY: MORE THAN HALF THE DAYS

## 2025-03-19 ASSESSMENT — ANXIETY QUESTIONNAIRES
6. BECOMING EASILY ANNOYED OR IRRITABLE: SEVERAL DAYS
IF YOU CHECKED OFF ANY PROBLEMS ON THIS QUESTIONNAIRE, HOW DIFFICULT HAVE THESE PROBLEMS MADE IT FOR YOU TO DO YOUR WORK, TAKE CARE OF THINGS AT HOME, OR GET ALONG WITH OTHER PEOPLE: SOMEWHAT DIFFICULT
5. BEING SO RESTLESS THAT IT IS HARD TO SIT STILL: NOT AT ALL
4. TROUBLE RELAXING: SEVERAL DAYS
GAD7 TOTAL SCORE: 8
1. FEELING NERVOUS, ANXIOUS, OR ON EDGE: MORE THAN HALF THE DAYS
7. FEELING AFRAID AS IF SOMETHING AWFUL MIGHT HAPPEN: MORE THAN HALF THE DAYS
3. WORRYING TOO MUCH ABOUT DIFFERENT THINGS: SEVERAL DAYS
2. NOT BEING ABLE TO STOP OR CONTROL WORRYING: SEVERAL DAYS

## 2025-03-19 ASSESSMENT — ENCOUNTER SYMPTOMS: NERVOUS/ANXIOUS: 1

## 2025-03-19 NOTE — TELEPHONE ENCOUNTER
3-19-25    Voice mail received from Ms. Chapa. She cancelled today's scheduled session. Return voice mail left for client. Provided session time/date rescheduling options.    Kera Landry, [facility]  Mental Health Counselor

## 2025-03-19 NOTE — PROGRESS NOTES
"Adult Ambulatory Psychiatry Progress Note      Assessment/Plan     Impression:  Cherrie Chapa is a 45 y.o. female domiciled  with 2 children, on disability, who presents for follow up with CC of \"I am ok. I am doing pain infusions through [facility] and had my 3rd Ketamine infusion yesterday. Other than that, it's been alright. The issue with the landlorjob was supposed to be put to bed, with that $2600 business, and I reached out to the  and he said he called the  who represented them and her and that was 3 weeks ago.\"    Plan: Patient was cooperative and calmer. Indicated that legal issues with gopi appear to have been taken care of and behind her now, so can focus on her pain management and getting out of her apartment more often now that winter is over. Currently doing Ketamine infusions for pain. Reviewed and agreed to leaving medications and treatment plan in place while she continues to see her therapist, Ms. Landry weekly.    Medication: Rexulti 4mg at bedtime. Zoloft 200mg every day. Atarax 25-50mg as needed for anxiety spikes and for additional sleep aid.     Reviewed r/b/a, possible side effects of the medication. Client is aware about the benefit outweighs the risk.     Diagnoses and all orders for this visit:  TIM (generalized anxiety disorder)  -     hydrOXYzine HCL (Atarax) 25 mg tablet; Take 1 tablet (25 mg) by mouth once daily as needed for anxiety.  Bipolar 2 disorder (Multi)  -     brexpiprazole 4 mg tablet; Take 4 mg by mouth once daily.  Irritability and anger  Obsessive thinking  PTSD (post-traumatic stress disorder)  Psychophysiological insomnia        Therapy: none    Other: n/a    Patient is reminded that if there is SI to call [phone] and get themselves to the closest ED for evaluation, otherwise contact me for other questions/concerns.     Subjective   HPI:  Virtual Consent    An interactive audio and video telecommunication system which permits real time " communications between the patient (at home) and provider (at home office) was utilized to provide this telehealth service.   Verbal consent was requested and obtained from Cherrie Chapa on this date, 03/19/2025 for a telehealth visit.     Present Illness - anxiety, depression     Characteristics/Recent psychiatric symptoms (pertinent positives and negatives) - reports her landlord/ has not bothered her in 3 weeks, since she has [employer] taken care of it. Stress levels are down, and with her health insurance plan having changed, she is able to attend her gym for free and can do her water classes which she enjoys it (attempting 3x a week). Reports anxiety is improved as the stress is down. Reports depression is caused by her internal alvarez with herself, with not wanting to being around people in public, and when at home, if she has tasks to get done, she will rush through the pile/tasks of things to be done and when she finally tackles them, she will overwhelm herself and feels mentally 'wiped out' and then no motivation to leave her apartment at that time. Admits there is a level of self-sabotage. Stevan has specific tasks to do during the day - shopping or go to the gym, and if she does anything, she waits until her son gets home from school, and often waits until the last minute with planning or takes too long to pick out clothes to go outside, that by the time she gets to her car, 'I am so tired that I can't do what I planned on doing.' Feels this is another example of self-sabotage. Her son is hoping to get a summer job, but worries for him as he has ADHD but still needs to be diagnosed with Autism through [facility] and there is a 2 year backlog. Does question what are her self-sabotage issues and feels that she struggles with tackling (going through her piles of mail, washing/putting away clothes, organizing her cabinets in kitchen) and finds herself dreading dealing with how to manage  them - akin to ADHD paralysis. Sleep has been improving - getting on average 6-7 hrs a night and trying to be in bed between 11p-midnight. Will take her dog Grizzly out 1x a day to the nearby dog park. Reports pain in her neck does her impact ability to get things done, so contributes to her low energy, and more mental/physical fatigue - and also feels fibro pain, and weather changes, especially season depression. Appetite is better. Racing, obsessive thoughts are quieting down, especially because of the stress related to the landlord has dropped off.     Onset/timeframe - 1 month  Type - anxiety, depression  Duration - daily  Aggravating and/or relieving factors/triggers - resolution of issues with gopi has alleviated stress in her life and now doing pain management for her back  Treatment and treatment changes (new meds, dosage increases or decreases, med compliance, therapy frequency, etc.) (Past and Recent) - She sees Megan Landry, her therapist twice a week with [facility] - DCBT and psychodynamic therapy. Rexulti 4mg @[facility], Zoloft 200mg QD, Atarax 25-50mg PRN.      Issues: Denies SI/HI/AVH.            Review of Systems   Eyes:         Just came from eye exam with drop for dilation so light sensitivity is noted   Psychiatric/Behavioral:  Positive for dysphoric mood. The patient is nervous/anxious.        OARRS:  Moody Peoples, APRN-CNP on 3/19/2025  2:40 PM  I have personally reviewed the OARRS report for Cherrie Chapa. I have considered the risks of abuse, dependence, addiction and diversion    Is the patient prescribed a combination of a benzodiazepine and opioid?  No    Last Urine Drug Screen / ordered today: Yes  Recent Results (from the past 8760 hours)   Benzodiazepine Confirmation, Urine    Collection Time: 04/15/24  4:46 PM   Result Value Ref Range    Clonazepam <25 <25 ng/mL    7-Aminoclonazepam <25 <25 ng/mL    Alprazolam <25 <25 ng/mL    Alpha-Hydroxyalprazolam <25 <25 ng/mL     Midazolam <25 <25 ng/mL    Alpha-Hydroxymidazolam <25 <25 ng/mL    Chlordiazepoxide <25 <25 ng/mL    Diazepam <25 <25 ng/mL    Nordiazepam 145 (H) <25 ng/mL    Temazepam 472 (H) <25 ng/mL    Oxazepam 274 (H) <25 ng/mL    Lorazepam <25 <25 ng/mL   Drug Screen, Urine With Reflex to Confirmation    Collection Time: 04/15/24  4:46 PM   Result Value Ref Range    Amphetamine Screen, Urine Presumptive Negative Presumptive Negative    Barbiturate Screen, Urine Presumptive Negative Presumptive Negative    Benzodiazepines Screen, Urine Presumptive Positive (A) Presumptive Negative    Cannabinoid Screen, Urine Presumptive Negative Presumptive Negative    Cocaine Metabolite Screen, Urine Presumptive Negative Presumptive Negative    Fentanyl Screen, Urine Presumptive Negative Presumptive Negative    Opiate Screen, Urine Presumptive Negative Presumptive Negative    Oxycodone Screen, Urine Presumptive Negative Presumptive Negative    PCP Screen, Urine Presumptive Negative Presumptive Negative    Methadone Screen, Urine Presumptive Negative Presumptive Negative     N/A    Clinical rationale for not completing a Urine Drug Screen: Restarting orders for drug screen put in place.      Controlled Substance Agreement:  Date of the Last Agreement: 03/23/2024    Objective   Mental Status Exam:  General Appearance: Well groomed, appropriate eye contact  Attitude/Behavior: Cooperative, Distracted  Motor: No psychomotor agitation or retardation, no tremor or other abnormal movements  Speech: Normal rate, volume, prosody  Gait/Station: Other:(comment) (sitting on top of bed, at home, over virtual connection)  Mood: 'better'  Affect: Euthymic, full-range, Anxious, Flat, Congruent with mood and topic of conversation  Thought Process: Linear, goal directed, Flight of ideas  Thought Associations: No loosening of associations  Thought Content: Other: (comment), Normal (legal issues related to her apartment appear to have been settled, and now  focused on pain management, and getting herself out of the apartment and working on her social anxiety issues.)  Perception: No perceptual abnormalities noted  Sensorium: Alert and oriented to person, place, time and situation  Insight: Fair  Judgement: Intact  Cognition: Cognitively intact to conversational testing with respect to attention, orientation, fund of knowledge, recent and remote memory, and language  Testing: N/A    TIM-7/PHQ-9 scores reviewed: 8, 12 compared to 9, 17 reflecting a mild improvement in anxiety and further improvement with depression/mood.    Current Medications:  Current Outpatient Medications on File Prior to Visit   Medication Sig Dispense Refill    acetaminophen (Tylenol) 325 mg tablet Take 2 tablets (650 mg) by mouth every 6 hours.      albuterol (Ventolin HFA) 90 mcg/actuation inhaler Inhale 2 puffs every 6 hours if needed for wheezing. 18 g 1    atorvastatin (Lipitor) 20 mg tablet TAKE 1 TABLET BY MOUTH EVERY DAY 90 tablet 1    Biktarvy -25 mg tablet Take 1 tablet by mouth once daily. 30 tablet 5    cyclobenzaprine (Flexeril) 5 mg tablet Take 1 tablet (5 mg) by mouth 3 times a day as needed for muscle spasms. 30 tablet 2    dextrose 5% parenteral solution 469.4 mL with ketamine 50 mg/mL solution 30 mg, lidocaine 10 mg/mL (1 %) solution 300 mg Infuse 30 mg into a venous catheter 1 time. Pain management      ergocalciferol (Vitamin D-2) 1.25 MG (54835 UT) capsule Take 1 capsule (1,250 mcg) by mouth 1 (one) time per week.      ibuprofen 600 mg tablet Take 1 tablet (600 mg) by mouth 3 times a day with meals. 90 tablet 2    ketorolac (Toradol) 30 mg/mL (1 mL) injection Infuse 1 mL (30 mg) into a venous catheter 1 time. Part of pain management infusion      lidocaine (Lidoderm) 5 % patch Place 1 patch on the skin every 12 hours. (Leave on for 12 hours, then remove and leave off for 12 hours)      mv-min-FA-vit K-lutein-zeaxant (PreserVision AREDS 2 Plus MV) 200 mcg-15 mcg- 5 mg-1 mg  capsule Take 1 capsule by mouth 2 times a day.      naproxen (Naprosyn) 500 mg tablet Take 1 tablet (500 mg) by mouth 2 times daily (morning and late afternoon).      nystatin (Mycostatin) 100,000 unit/gram powder Apply 1 Application topically 2 times a day. 60 g 1    polyethylene glycol (Glycolax, Miralax) 17 gram/dose powder Mix 17 g of powder and drink. 17 gram/dose powder      propofol (Diprivan) 10 mg/mL emulsion Infuse 5-20 mcg/kg/min into a venous catheter continuously. During pain management procedure only      sertraline (Zoloft) 100 mg tablet Take 2 tablets (200 mg) by mouth once daily 180 tablet 3    [DISCONTINUED] brexpiprazole 4 mg tablet Take 4 mg by mouth once daily. 60 tablet 11    [DISCONTINUED] hydrOXYzine HCL (Atarax) 25 mg tablet Take 1 tablet (25 mg) by mouth once daily as needed for anxiety. 90 tablet 1     No current facility-administered medications on file prior to visit.       Lab Review:   Office Visit on 02/07/2025   Component Date Value    Neisseria gonorrhea,Ampl* 02/07/2025 Negative     Chlamydia trachomatis, A* 02/07/2025 Negative     WBC 02/07/2025 8.4     nRBC 02/07/2025 0.0     RBC 02/07/2025 4.32     Hemoglobin 02/07/2025 13.8     Hematocrit 02/07/2025 42.5     MCV 02/07/2025 98     MCH 02/07/2025 31.9     MCHC 02/07/2025 32.5     RDW 02/07/2025 12.4     Platelets 02/07/2025 306     Neutrophils % 02/07/2025 43.8     Immature Granulocytes %,* 02/07/2025 0.2     Lymphocytes % 02/07/2025 44.0     Monocytes % 02/07/2025 9.8     Eosinophils % 02/07/2025 1.7     Basophils % 02/07/2025 0.5     Neutrophils Absolute 02/07/2025 3.67     Immature Granulocytes Ab* 02/07/2025 0.02     Lymphocytes Absolute 02/07/2025 3.69     Monocytes Absolute 02/07/2025 0.82     Eosinophils Absolute 02/07/2025 0.14     Basophils Absolute 02/07/2025 0.04     Glucose 02/07/2025 79     Sodium 02/07/2025 140     Potassium 02/07/2025 4.0     Chloride 02/07/2025 104     Bicarbonate 02/07/2025 25     Anion Gap  02/07/2025 15     Urea Nitrogen 02/07/2025 14     Creatinine 02/07/2025 0.81     eGFR 02/07/2025 >90     Calcium 02/07/2025 9.9     Albumin 02/07/2025 4.4     Alkaline Phosphatase 02/07/2025 69     Total Protein 02/07/2025 7.0     AST 02/07/2025 17     Bilirubin, Total 02/07/2025 0.6     ALT 02/07/2025 19     Hemoglobin A1C 02/07/2025 5.3     Estimated Average Glucose 02/07/2025 105     HIV-1 RNA PCR Viral Load* 02/07/2025      HIV RNA Result 02/07/2025 Not Detected     Cholesterol 02/07/2025 243 (H)     HDL-Cholesterol 02/07/2025 43.2     Cholesterol/HDL Ratio 02/07/2025 5.6     LDL Calculated 02/07/2025 146 (H)     VLDL 02/07/2025 54 (H)     Triglycerides 02/07/2025 270 (H)     Non HDL Cholesterol 02/07/2025 200 (H)     Syphilis Total Ab 02/07/2025 Reactive (A)     Trichomonas Vaginalis 02/07/2025 Negative     Lymphocyte Absolute 02/07/2025 3.69     CD3+CD4+% 02/07/2025 46     CD3+CD4+ Absolute 02/07/2025 1.697     CD3+CD8+% 02/07/2025 40 (H)     CD3+CD8+ Absolute 02/07/2025 1.476     CD4/CD8 Ratio 02/07/2025 1.15     CD3+CD4+ Absolute (/mm3) 02/07/2025 1,697     RPR  02/07/2025 Nonreactive     Treponema Confirm 02/07/2025 Nonreactive    Office Visit on 10/23/2024   Component Date Value    Case Report 10/23/2024                      Value:Gynecologic Cytology                              Case: [id number]                                   Authorizing Provider:  Lakeisha Groves MD   Collected:           10/23/2024 1157              Ordering Location:     [facility]       Received:            10/23/2024 1157              First Screen:          NAEL Bee                                                           Rescreen:              NAEL Song                                                               Specimen:    ThinPrep Liquid-Based Pap-Manual Screen, ECTO/ENDOCERVIX/VAGINA, SCREENING                 Final Cytological Interp* 10/23/2024                      Value:    A. THINPREP PAP  ECTO/ENDOCERVIX/VAGINA, SCREENING -     Specimen Adequacy  Satisfactory for evaluation; absence of endocervical/transformation zone component    General Categorization  Negative for intraepithelial lesion or malignancy.    Descriptive Interpretation  Negative for intraepithelial lesion or malignancy  Shift in vaginal darleen suggestive of bacterial vaginosis              10/23/2024                      Value:Slide(s) initially screened by NAEL Bee at [facility] [address]  [place] [address]  QC review performed by NAEL Song at [facility]  [facility] [address]  By the signature on this report, the individual or group listed as making the Final Interpretation/Diagnosis certifies that they have reviewed this case.       Educational Note 10/23/2024                      Value:Cervical cytology is a screening procedure primarily for squamous cancers and precursors and has associated false-negative and false-positives results as evidenced by published data. Your patient's test should be interpreted in this context, together with the patient's history and clinical findings. Regular sampling and follow-up of unexplained clinical signs and symptoms are recommended to minimize false negative results.      Perform HPV HR test? 10/23/2024 Always (all interpretations)     Include HPV Genotype? 10/23/2024 No     Menstrual status 10/23/2024                      Value:Hysterectomy      Previous abnormal cytolo* 10/23/2024                      Value:ASCUS      Additional Information 10/23/2024                      Value:HIV +      HPV, high-risk 10/23/2024 Negative          Time Spent:    Prep time: 1 min.  Direct patient time: 27 min.  Documentation time: 6 min.  Total time: 34 min.    Next Appointment:  Follow up in 9 weeks (on 5/21/2025).

## 2025-03-20 ENCOUNTER — TELEPHONE (OUTPATIENT)
Dept: IMMUNOLOGY | Facility: CLINIC | Age: 45
End: 2025-03-20
Payer: MEDICARE

## 2025-03-20 NOTE — TELEPHONE ENCOUNTER
3-20-25      MsValarie Chapa called and confirmed her counseling appointment on 3-26-25.    Kera Landry, [facility]  Mental Health Counselor

## 2025-03-20 NOTE — TELEPHONE ENCOUNTER
3-20-25    Voice message left for Ms. Chapa.    Kera Landry, [facility]  Mental Health Counselor

## 2025-03-21 ASSESSMENT — ENCOUNTER SYMPTOMS: DYSPHORIC MOOD: 1

## 2025-03-24 ENCOUNTER — PHARMACY VISIT (OUTPATIENT)
Dept: PHARMACY | Facility: CLINIC | Age: 45
End: 2025-03-24
Payer: COMMERCIAL

## 2025-03-26 ENCOUNTER — SOCIAL WORK (OUTPATIENT)
Dept: IMMUNOLOGY | Facility: CLINIC | Age: 45
End: 2025-03-26
Payer: MEDICARE

## 2025-03-26 NOTE — PROGRESS NOTES
[facility]     Mental Health Counseling Session Update - Protected Health Information           Date:   3-26-25           In person session with Ms. Chapa.                  Total  Time:   60 min         Progress:  Good                  /       Fair                                                                                                                       Compliant with Psych. Meds:  Yes       Therapeutic Modality:  Supportive                                                                   Factors regarding Medical treatment:   Multiple medical/ physical concerns/ limitations.                                                                                    Changes (if any) to Initial Assessment:                                                                             Treatment Modality:  Individual    Suicidal/ Homicidal Concerns:  No     Factors aiding or impeding psychotherapy progress:   came     Processed client's thoughts, feelings, and actions.  Looked at perceptions, assumptions, expectations, and realistic outcomes.  Talked about establishing and maintaining limits/ boundaries despite push-back.  Examined thought, behavioral, emotional, and relationship patterns.  Discussed mental health and physical health connectedness.  Explored ideas, goals, choices, awareness, discernment, and problem-solving.  Talked about establishing and maintaining limits/ boundaries despite push-back.  Encouraged re-engaging in self-care routines, exercise, and mindfulness.  Reflected on client's life experiences and noted progress.  Provided support and empathetic understanding.                                                       Next Session:    4-1-25     Kera Landry [facility]  Mental Health Counselor

## 2025-04-01 ENCOUNTER — TELEPHONE (OUTPATIENT)
Dept: IMMUNOLOGY | Facility: CLINIC | Age: 45
End: 2025-04-01
Payer: COMMERCIAL

## 2025-04-01 NOTE — TELEPHONE ENCOUNTER
4-1-25    Ms. Chapa did not come in for today's scheduled session. Two phone attempts made, voice messages left for client.      Kera Landry, [facility]  Mental Health Counselor

## 2025-04-04 ENCOUNTER — TELEPHONE (OUTPATIENT)
Dept: IMMUNOLOGY | Facility: CLINIC | Age: 45
End: 2025-04-04
Payer: COMMERCIAL

## 2025-04-04 NOTE — TELEPHONE ENCOUNTER
4-4-25      Voice mail left for Ms. Chapa.  Regarding her upcoming session on 4-8-25.    Kera Landry, [facility]  Mental Health Counselor

## 2025-04-07 ENCOUNTER — TELEPHONE (OUTPATIENT)
Dept: IMMUNOLOGY | Facility: CLINIC | Age: 45
End: 2025-04-07
Payer: COMMERCIAL

## 2025-04-07 NOTE — TELEPHONE ENCOUNTER
4-7-25    Voice mail received from Ms. Chapa.  Return voice mail left for client. Provided counseling appointment confirmation for a session on 4-8-25.      Kera Landry, [facility]  Mental Health Counselor

## 2025-04-08 ENCOUNTER — PHARMACY VISIT (OUTPATIENT)
Dept: PHARMACY | Facility: CLINIC | Age: 45
End: 2025-04-08
Payer: COMMERCIAL

## 2025-04-08 ENCOUNTER — SOCIAL WORK (OUTPATIENT)
Dept: IMMUNOLOGY | Facility: CLINIC | Age: 45
End: 2025-04-08
Payer: COMMERCIAL

## 2025-04-08 ENCOUNTER — NUTRITION (OUTPATIENT)
Dept: IMMUNOLOGY | Facility: CLINIC | Age: 45
End: 2025-04-08
Payer: COMMERCIAL

## 2025-04-08 ENCOUNTER — TELEPHONE (OUTPATIENT)
Dept: IMMUNOLOGY | Facility: CLINIC | Age: 45
End: 2025-04-08
Payer: COMMERCIAL

## 2025-04-08 DIAGNOSIS — E78.5 DYSLIPIDEMIA: ICD-10-CM

## 2025-04-08 DIAGNOSIS — Z21 HIV INFECTION, UNSPECIFIED SYMPTOM STATUS: ICD-10-CM

## 2025-04-08 DIAGNOSIS — Z59.41 FOOD INSECURITY: ICD-10-CM

## 2025-04-08 PROCEDURE — RXMED WILLOW AMBULATORY MEDICATION CHARGE

## 2025-04-08 RX ORDER — BICTEGRAVIR SODIUM, EMTRICITABINE, AND TENOFOVIR ALAFENAMIDE FUMARATE 50; 200; 25 MG/1; MG/1; MG/1
1 TABLET ORAL DAILY
Qty: 30 TABLET | Refills: 5 | Status: SHIPPED | OUTPATIENT
Start: 2025-04-08

## 2025-04-08 RX ORDER — ATORVASTATIN CALCIUM 20 MG/1
20 TABLET, FILM COATED ORAL DAILY
Qty: 30 TABLET | Refills: 5 | Status: SHIPPED | OUTPATIENT
Start: 2025-04-08

## 2025-04-08 SDOH — ECONOMIC STABILITY: FOOD INSECURITY: WITHIN THE PAST 12 MONTHS, THE FOOD YOU BOUGHT JUST DIDN'T LAST AND YOU DIDN'T HAVE MONEY TO GET MORE.: OFTEN TRUE

## 2025-04-08 SDOH — ECONOMIC STABILITY: FOOD INSECURITY: WITHIN THE PAST 12 MONTHS, YOU WORRIED THAT YOUR FOOD WOULD RUN OUT BEFORE YOU GOT MONEY TO BUY MORE.: OFTEN TRUE

## 2025-04-08 SDOH — ECONOMIC STABILITY - FOOD INSECURITY: FOOD INSECURITY: Z59.41

## 2025-04-08 NOTE — PROGRESS NOTES
[facility]     Mental Health Counseling Session Update - Protected Health Information           Date:   4-8-25           In person session with Ms. Chapa.                  Total  Time:   60 min         Progress:  Good                  /       Fair                                                                                                                       Compliant with Psych. Meds:  Yes       Therapeutic Modality:  Supportive                                                                   Factors regarding Medical treatment:   Multiple medical/ physical concerns/ limitations.                                                                                    Changes (if any) to Initial Assessment:                                                                             Treatment Modality:  Individual    Suicidal/ Homicidal Concerns:  No     Factors aiding or impeding psychotherapy progress:   came     Processed client's thoughts, feelings, and actions.  Looked at perceptions, assumptions, expectations, and realistic outcomes.  Talked about establishing and maintaining limits/ boundaries despite push-back.  Examined thought, behavioral, emotional, and relationship patterns.  Discussed mental health and physical health connectedness.  Explored ideas, goals, choices, awareness, discernment, and problem-solving.  Talked about establishing and maintaining limits/ boundaries despite push-back.  Process grief and loss.  Encouraged re-engaging in self-care routines, exercise, and mindfulness.  Reflected on client's life experiences and noted progress.  Provided support and empathetic understanding.                                                       Next Session:    4-16-25     Kera Landry [facility]  Mental Health Counselor

## 2025-04-08 NOTE — TELEPHONE ENCOUNTER
SW received message from RN. Pt reported insurance and medication access concerns.    SW called Medicaid provider hotline to check for further information. Pt sent [employer] Dual Card earlier this year. Per hotline, pt's Medicaid is with [facility] and billing number is [id number] [Case Number [id number]].   Hotline provided below Medicare information:  Medicare Part A, B: [id number]  Eligible for C and D    SW called pt and provided the info. Per pt, her insurance was changed to [facility] from [facility] and unsure why. Pt mentioned that she was able to speak with insurance and would get Biktarvy from [employer] this month. Has not received new insurance card. Per pt, starting May, she would continue to have [facility] Dual plan.     Pt inquired if her future script could be switched to [facility]. SW messaged PharmD for the request. Script was sent.    Pt expressed food insecurity concerns as rent (thru [email or web address]) increased by 100$ a month and pt was concerned about funds shopping for food. Email was sent to dietitijosefa Wilkinson regarding [facility] referral.    Pt mentioned that she has some bills from . SW encouraged pt to drop off the bills for  so that CHARLEEN could send to  for review.

## 2025-04-08 NOTE — TELEPHONE ENCOUNTER
Scripts from CHRISTIANO were sent to [facility]. Notified pt.

## 2025-04-11 ENCOUNTER — APPOINTMENT (OUTPATIENT)
Dept: OPHTHALMOLOGY | Facility: CLINIC | Age: 45
End: 2025-04-11
Payer: MEDICAID

## 2025-04-11 DIAGNOSIS — H52.223 REGULAR ASTIGMATISM OF BOTH EYES: ICD-10-CM

## 2025-04-11 DIAGNOSIS — H35.3131 EARLY DRY STAGE NONEXUDATIVE AGE-RELATED MACULAR DEGENERATION OF BOTH EYES: ICD-10-CM

## 2025-04-11 DIAGNOSIS — H52.4 PRESBYOPIA: ICD-10-CM

## 2025-04-11 DIAGNOSIS — Z21 ASYMPTOMATIC HIV INFECTION, WITH NO HISTORY OF HIV-RELATED ILLNESS (MULTI): ICD-10-CM

## 2025-04-11 DIAGNOSIS — H11.442 CONJUNCTIVAL CYST OF LEFT EYE: ICD-10-CM

## 2025-04-11 DIAGNOSIS — H35.3132 INTERMEDIATE STAGE NONEXUDATIVE AGE-RELATED MACULAR DEGENERATION OF BOTH EYES: Primary | ICD-10-CM

## 2025-04-11 DIAGNOSIS — H52.03 HYPEROPIA OF BOTH EYES: ICD-10-CM

## 2025-04-11 PROBLEM — F17.210 CIGARETTE SMOKER: Status: ACTIVE | Noted: 2025-04-11

## 2025-04-11 ASSESSMENT — ENCOUNTER SYMPTOMS
HEMATOLOGIC/LYMPHATIC NEGATIVE: 0
MUSCULOSKELETAL NEGATIVE: 0
ALLERGIC/IMMUNOLOGIC NEGATIVE: 0
CONSTITUTIONAL NEGATIVE: 0
ENDOCRINE NEGATIVE: 0
NEUROLOGICAL NEGATIVE: 0
RESPIRATORY NEGATIVE: 1
GASTROINTESTINAL NEGATIVE: 0
EYES NEGATIVE: 0
PSYCHIATRIC NEGATIVE: 1
CARDIOVASCULAR NEGATIVE: 1

## 2025-04-11 ASSESSMENT — CONF VISUAL FIELD
METHOD: COUNTING FINGERS
OS_SUPERIOR_TEMPORAL_RESTRICTION: 0
OD_INFERIOR_TEMPORAL_RESTRICTION: 0
OD_INFERIOR_NASAL_RESTRICTION: 0
OS_INFERIOR_NASAL_RESTRICTION: 0
OD_SUPERIOR_NASAL_RESTRICTION: 0
OD_SUPERIOR_TEMPORAL_RESTRICTION: 0
OS_INFERIOR_TEMPORAL_RESTRICTION: 0
OD_NORMAL: 1
OS_SUPERIOR_NASAL_RESTRICTION: 0
OS_NORMAL: 1

## 2025-04-11 ASSESSMENT — REFRACTION
OS_SPHERE: +0.75
OD_ADD: +1.50
OD_AXIS: 090
OD_CYLINDER: -0.75
OD_SPHERE: +0.75
OS_ADD: +1.50
OS_AXIS: 085
OS_CYLINDER: -1.00

## 2025-04-11 ASSESSMENT — CUP TO DISC RATIO
OD_RATIO: 0.3
OS_RATIO: 0.3

## 2025-04-11 ASSESSMENT — REFRACTION_WEARINGRX
OS_ADD: +1.50
OD_ADD: +1.50
OS_AXIS: 081
OS_SPHERE: +0.50
OD_AXIS: 091
OD_SPHERE: +0.50
OD_CYLINDER: -0.75
OS_CYLINDER: -1.00

## 2025-04-11 ASSESSMENT — SLIT LAMP EXAM - LIDS
COMMENTS: NORMAL
COMMENTS: NORMAL

## 2025-04-11 ASSESSMENT — REFRACTION_MANIFEST
OD_ADD: +1.50
OS_ADD: +1.50
OS_CYLINDER: -1.00
OS_SPHERE: +0.75
OD_SPHERE: +0.75
OD_CYLINDER: -0.75
OS_AXIS: 085
OD_AXIS: 090

## 2025-04-11 ASSESSMENT — VISUAL ACUITY
METHOD: SNELLEN - LINEAR
OS_SC: 20/20
OD_SC: 20/20
OD_SC+: -2

## 2025-04-11 ASSESSMENT — TONOMETRY
IOP_METHOD: GOLDMANN APPLANATION
OD_IOP_MMHG: 16
OS_IOP_MMHG: 17

## 2025-04-11 ASSESSMENT — EXTERNAL EXAM - RIGHT EYE: OD_EXAM: NORMAL

## 2025-04-11 ASSESSMENT — EXTERNAL EXAM - LEFT EYE: OS_EXAM: NORMAL

## 2025-04-11 NOTE — PROGRESS NOTES
Assessment/Plan   Diagnoses and all orders for this visit:  Intermediate stage nonexudative age-related macular degeneration of both eyes  -     OCT, Retina - OU - Both Eyes  Stable. Not VS at this time. Continue w/ AREDS 2 po bid. Sun protection outdoors, healthy diet, exercise. Continue no smoking. Monitor weekly w/ Amsler grid. RTC 6 months for comprehensive exam w/ OCT macula or ASAP w/ changes on grid or in vision.     Asymptomatic HIV infection, with no history of HIV-related illness (Multi)  No retinopathy. Monitor.     Hyperopia of both eyes  Regular astigmatism of both eyes  Presbyopia  New spec rx released today per patient request. Ocular health wnl for age OU. Monitor 1 year or sooner prn. Refraction billed today. Pt consents to receiving glasses Rx today. Patient's/guardian's signature obtained to acknowledge and confirm that a paper copy of glasses Rx was given to patient in compliance with [facility] Eyeglass Rule. Electronic copy of Rx will also be available via [employer].     Conjunctival cyst of left eye  Cyst elevated on exam today. Will initiate pulse dose of eysuvis bid OS x 2 weeks then stop. Shake bottle before use. Consider consult Dr. Parks if worsening.

## 2025-04-16 ENCOUNTER — SOCIAL WORK (OUTPATIENT)
Dept: IMMUNOLOGY | Facility: CLINIC | Age: 45
End: 2025-04-16
Payer: COMMERCIAL

## 2025-04-16 NOTE — PROGRESS NOTES
[facility]     Mental Health Counseling Session Update - Protected Health Information           Date:   4-16-25           Telehealth virtual session with Ms. Chapa.                  Total  Time:   60 min         Progress:  Good                  /       Fair                                                                                                                       Compliant with Psych. Meds:  Yes       Therapeutic Modality:  Supportive                                                                   Factors regarding Medical treatment:   Multiple medical/ physical concerns/ limitations.                                                                                    Changes (if any) to Initial Assessment:                                                                             Treatment Modality:  Individual    Suicidal/ Homicidal Concerns:  No     Factors aiding or impeding psychotherapy progress:   came     Processed client's thoughts, feelings, and actions.  Looked at perceptions, assumptions, expectations, and realistic outcomes.  Examined thought, behavioral, emotional, and relationship patterns.  Discussed mental health and physical health connectedness.  Explored ideas, goals, choices, awareness, discernment, and problem-solving.  Talked about continuing to establish and maintain limits/ boundaries despite push-back.  Encouraged re-engaging in self-care routines, exercise, and mindfulness.  Reflected on client's life experiences and noted progress.  Provided support and empathetic understanding.                                                       Next Session:    4-23-25     Kera Landry [facility]  Mental Health Counselor

## 2025-04-23 ENCOUNTER — SOCIAL WORK (OUTPATIENT)
Dept: IMMUNOLOGY | Facility: CLINIC | Age: 45
End: 2025-04-23
Payer: COMMERCIAL

## 2025-04-23 NOTE — PROGRESS NOTES
[facility]     Mental Health Counseling Session Update - Protected Health Information           Date:   4-23-25           Telehealth virtual session with Ms. Chapa.                  Total  Time:   60 min         Progress:  Good                  /       Fair                                                                                                                       Compliant with Psych. Meds:  Yes       Therapeutic Modality:  Supportive                                                                   Factors regarding Medical treatment:   Multiple medical/ physical concerns/ limitations.                                                                                    Changes (if any) to Initial Assessment:                                                                             Treatment Modality:  Individual    Suicidal/ Homicidal Concerns:  No     Factors aiding or impeding psychotherapy progress:   came     Processed client's thoughts, feelings, and actions.  Looked at perceptions, assumptions, expectations, and realistic outcomes.  Examined thought, behavioral, emotional, and relationship patterns.  Discussed mental health and physical health connectedness.  Explored ideas, goals, choices, awareness, discernment, and problem-solving.  Talked about continuing to establish and maintain limits/ boundaries despite push-back.  Encouraged re-engaging in self-care routines, exercise, and mindfulness.  Reflected on client's life experiences and noted progress.  Provided support and empathetic understanding.                                                       Next Session:    4-29-25     Kera Landry [facility]  Mental Health Counselor

## 2025-04-29 ENCOUNTER — SOCIAL WORK (OUTPATIENT)
Dept: IMMUNOLOGY | Facility: CLINIC | Age: 45
End: 2025-04-29
Payer: COMMERCIAL

## 2025-04-29 NOTE — PROGRESS NOTES
[facility]     Mental Health Counseling Session Update - Protected Health Information           Date:   4-29-25           In person session with Ms. Chapa.                    Total  Time:   60 min         Progress:  Good                  /       Fair                                                                                                                       Compliant with Psych. Meds:  Yes       Therapeutic Modality:  Supportive                                                                   Factors regarding Medical treatment:   Multiple medical/ physical concerns/ limitations.                                                                                    Changes (if any) to Initial Assessment:                                                                             Treatment Modality:  Individual    Suicidal/ Homicidal Concerns:  No     Factors aiding or impeding psychotherapy progress:   came     Processed client's thoughts, feelings, and actions.  Looked at perceptions, assumptions, expectations, and realistic outcomes.  Examined thought, behavioral, emotional, and relationship patterns.  Discussed mental health and physical health connectedness.  Explored ideas, goals, choices, awareness, discernment, and problem-solving.  Talked about continuing to establish and maintain limits/ boundaries despite push-back.  Encouraged re-engaging in self-care, routines, some social, outside, exercise, and mindfulness.  Reflected on client's life experiences and noted progress.  Provided support and empathetic understanding.                                                       Next Session:    5-6-25     Kera Landry [facility]  Mental Health Counselor

## 2025-05-06 ENCOUNTER — TELEPHONE (OUTPATIENT)
Dept: IMMUNOLOGY | Facility: CLINIC | Age: 45
End: 2025-05-06
Payer: COMMERCIAL

## 2025-05-06 NOTE — TELEPHONE ENCOUNTER
5-6-25    Voice mail received from Ms. Chapa. She requested her session today be a virtual telehealth session.    Ms. Chapa did not log into the virtual telehealth platform link for today's scheduled telehealth session. Two phone attempts made, voice messages left for client.      Kera Landry, [facility]  Mental Health Counselor

## 2025-05-07 ENCOUNTER — TELEPHONE (OUTPATIENT)
Dept: IMMUNOLOGY | Facility: CLINIC | Age: 45
End: 2025-05-07
Payer: COMMERCIAL

## 2025-05-07 NOTE — TELEPHONE ENCOUNTER
5-7-25    Ms. Chapa called and talked about a few things going on this week. She rescheduled her session for 5-13-25.    Kera Landry, [facility]  Mental Health Counselor

## 2025-05-13 ENCOUNTER — SOCIAL WORK (OUTPATIENT)
Dept: IMMUNOLOGY | Facility: CLINIC | Age: 45
End: 2025-05-13
Payer: COMMERCIAL

## 2025-05-13 NOTE — PROGRESS NOTES
[facility]     Mental Health Counseling Session Update - Protected Health Information           Date:   5-13-25           In person session with Ms. Chapa.                    Total  Time:   60 min         Progress:  Good                  /       Fair                                                                                                                       Compliant with Psych. Meds:  Yes       Therapeutic Modality:  Supportive                                                                   Factors regarding Medical treatment:   Multiple medical/ physical concerns/ limitations.                                                                                    Changes (if any) to Initial Assessment:                                                                             Treatment Modality:  Individual    Suicidal/ Homicidal Concerns:  No     Factors aiding or impeding psychotherapy progress:   came     Processed client's thoughts, feelings, and actions.  Looked at perceptions, assumptions, expectations, and realistic outcomes.  Examined thought, behavioral, emotional, and relationship patterns.  Discussed mental health and physical health connectedness.  Focused on triggers, behaviors, and long term affects of trauma.  Explored ideas, goals, choices, awareness, discernment, and problem-solving.  Talked about continuing to establish and maintain limits/ boundaries despite push-back.  Encouraged re-engaging in self-care, routines, some social, outside, exercise, and mindfulness.  Reflected on client's life experiences and noted progress.  Provided support and empathetic understanding.                                                       Next Session:    5-21-25     Kera Landry [facility]  Mental Health Counselor

## 2025-05-21 ENCOUNTER — TELEPHONE (OUTPATIENT)
Dept: IMMUNOLOGY | Facility: CLINIC | Age: 45
End: 2025-05-21

## 2025-05-21 ENCOUNTER — APPOINTMENT (OUTPATIENT)
Dept: BEHAVIORAL HEALTH | Facility: CLINIC | Age: 45
End: 2025-05-21
Payer: MEDICARE

## 2025-05-21 ENCOUNTER — PHARMACY VISIT (OUTPATIENT)
Dept: PHARMACY | Facility: CLINIC | Age: 45
End: 2025-05-21
Payer: COMMERCIAL

## 2025-05-21 DIAGNOSIS — R45.4 IRRITABILITY AND ANGER: ICD-10-CM

## 2025-05-21 DIAGNOSIS — F43.0 PANIC ATTACK AS REACTION TO STRESS: ICD-10-CM

## 2025-05-21 DIAGNOSIS — F31.81 BIPOLAR 2 DISORDER (MULTI): ICD-10-CM

## 2025-05-21 DIAGNOSIS — F41.0 PANIC ATTACK AS REACTION TO STRESS: ICD-10-CM

## 2025-05-21 DIAGNOSIS — F43.10 PTSD (POST-TRAUMATIC STRESS DISORDER): ICD-10-CM

## 2025-05-21 DIAGNOSIS — F41.1 GAD (GENERALIZED ANXIETY DISORDER): Primary | ICD-10-CM

## 2025-05-21 DIAGNOSIS — F42.8 OBSESSIVE THINKING: ICD-10-CM

## 2025-05-21 DIAGNOSIS — F51.04 PSYCHOPHYSIOLOGICAL INSOMNIA: ICD-10-CM

## 2025-05-21 PROCEDURE — RXMED WILLOW AMBULATORY MEDICATION CHARGE

## 2025-05-21 RX ORDER — NALOXONE HYDROCHLORIDE 0.4 MG/ML
0.4 INJECTION, SOLUTION INTRAMUSCULAR; INTRAVENOUS; SUBCUTANEOUS AS NEEDED
COMMUNITY
Start: 2025-04-22

## 2025-05-21 ASSESSMENT — ENCOUNTER SYMPTOMS: NERVOUS/ANXIOUS: 1

## 2025-05-21 NOTE — TELEPHONE ENCOUNTER
5-21-25    Voice mail received from Ms. Chapa.   Returned her call and spoke with client.  She cancelled today's scheduled session and rescheduled it for 5-23-25.    Kera Landry, [facility]  Mental Health Counselor

## 2025-05-21 NOTE — PROGRESS NOTES
"Adult Ambulatory Psychiatry Progress Note      Assessment/Plan     Impression:  Cherrie Chapa is a 45 y.o. female domiciled  with 2 children, on disability, who presents for follow up with CC of \"I've been ok. Nothing to complain about. I am still in the apartment, and I am supposed to see her (landlord) to sign some papers for the apartment. I did have some stress over the past several weeks with my brother and his mental health and his living in a group home, and then my dad and a possible issue with his prostate so we will see. And then my son Norman got into a program called [facility] and he is doing orientation soon. My aunt is also an issue so I have had to contend with all of that, but I am keeping my appts with Kera and she is helping me navigate all of this.\"    Plan: Patient was cooperative and calm if not engaging. Outside of some stress worrying about her brother and his mental health/well-being/living situation and her parents' health, overall feels she is doing 'ok'. Still doing Ketamine cocktail infusions for pain through [facility]. Reviewed and agreed to leaving medications and treatment plan in place while she continues to see her therapist, Ms. Landry weekly.    Medication: Rexulti 4mg at bedtime. Zoloft 200mg every day. Atarax 25-50mg as needed for anxiety spikes and for additional sleep aid.     Reviewed r/b/a, possible side effects of the medication. Client is aware about the benefit outweighs the risk.     Diagnoses and all orders for this visit:  TIM (generalized anxiety disorder)  Bipolar 2 disorder (Multi)  Obsessive thinking  Panic attack as reaction to stress  PTSD (post-traumatic stress disorder)  Irritability and anger  Psychophysiological insomnia          Therapy: none    Other: n/a    Patient is reminded that if there is SI to call [phone] and get themselves to the closest ED for evaluation, otherwise contact me for other questions/concerns.     Subjective "   HPI:  Virtual Consent    An interactive audio and video telecommunication system which permits real time communications between the patient (at home) and provider (at home office) was utilized to provide this telehealth service.   Verbal consent was requested and obtained from Cherrie Chapa on this date, 2025 for a telehealth visit.     Present Illness - anxiety     Characteristics/Recent psychiatric symptoms (pertinent positives and negatives) - reports her landlord/ has not bothered her still and are meeting this Friday to calmly go over some paperwork, so is not feeling stressed. Outside of stressing out over her brother's mental health and living situation and her own father's medical health being questionable, she has been making effort to remain calm - taking walks several days a week, to lose weight, and doing pain management and finding relief for her fibromyalgia. Reveals she and her son and daughter were invited to her  friend's son's high school graduation on  (last saw him 3 years ago). Overall reports her anxiety and depression are 'not too bad'. Still working on trying to be outside in public around others, but when there are too many people, she will shut down and goes home. Often times will try and time it so when her son, Norman gets home, so that he can accompany her when she goes for walks. Sleep does average her about 6 hours a night, and is making sure to stay up until midnight and wakes up around 6am, as she wants to make sure she has a long day ahead of her. Admits to a lot of the obsessive thoughts impacting her and not much racing, ruminating or intrusive thoughts. Denies eric. Appetite is 'ok. I am also trying to get back into drinking different types of teas - I want to expand my horizons.' Admits while frustrated with the political issues impacting the country and world, and it can overwhelm her easily, she is making effort to limit how much she  exposes herself to the information and again ties this also to how often she interacts with others in public or even on the phone, when others talk and don't respect boundaries, 'and I will tell them I need my reset time, otherwise I am going to rage.'     Onset/timeframe - 1 month  Type - anxiety  Duration - daily  Aggravating and/or relieving factors/triggers - brother's mental health issues/living situation, worrying about her parents' health but in general makes continued effort to get outside for walks and be in public within limits (when feeling crowded in, will go home and try again when less people are around)  Treatment and treatment changes (new meds, dosage increases or decreases, med compliance, therapy frequency, etc.) (Past and Recent) - She sees Megan Landry, her therapist twice a week with [facility] - DCBT and psychodynamic therapy. Rexulti 4mg @[facility], Zoloft 200mg QD, Atarax 25-50mg PRN.      Issues: Denies SI/HI/AVH.     Reveals doing pain management - Ketamine, Lidocaine, Toradol, Propofol combination infusion through [facility], which is allowing her to be more functional (doing more around the home tasks, moving items) and found out at most recent visit she had lost 4 lbs. She is getting out more frequently to exercise, and is tracking her steps (averaging 4000 steps a day) and is proud of the progress. Admits the pain management is allowing her to move about and feels less strained and has had 6 sessions thus far.    Making sure to stay up later and keeping herself busy, and by doing more during the day, she will fall asleep d/t being tired. If she feels overwhelmed when out walking by others in the park, she will go home, and do an evening walk instead. Making effort to balance her life.     Reveals the group home where her brother resides, kicked him out, and the person onsite was withholding his medications and it turns out he was needing 988 help to get him into a new  facility with [facility], and she had to yell at the  in charge as they were lying he was being given his medications when that was furthest from the truth. All of this was very triggering for her, as when she had to clear out his personal belongings from the old apartment, she felt overwhelmed and with support from Kera, her therapist, put up boundaries and said she cannot do everything - asking her parents to help out and they said they couldn't and the patient had to let a lot of his personal items, go.           Review of Systems   Eyes:         Just came from eye exam with drop for dilation so light sensitivity is noted   Psychiatric/Behavioral:  The patient is nervous/anxious.        OARRS:  Moody Peoples, APRN-CNP on 5/21/2025  2:35 PM  I have personally reviewed the OARRS report for Cherrie Chapa. I have considered the risks of abuse, dependence, addiction and diversion    Is the patient prescribed a combination of a benzodiazepine and opioid?  No    Last Urine Drug Screen / ordered today: Yes  No results found for this or any previous visit (from the past 8760 hours).    N/A    Clinical rationale for not completing a Urine Drug Screen: Restarting orders for drug screen put in place.      Controlled Substance Agreement:  Date of the Last Agreement: 03/23/2024    Objective   Mental Status Exam:  General Appearance: Well groomed, appropriate eye contact  Attitude/Behavior: Cooperative  Motor: No psychomotor agitation or retardation, no tremor or other abnormal movements  Speech: Normal rate, volume, prosody, Other: (comment) (periodically rapid)  Gait/Station: Other:(comment) (sitting on couch over virtual connection)  Mood: 'doing ok, stressed, but ok'  Affect: Euthymic, full-range, Anxious, Congruent with mood and topic of conversation  Thought Process: Linear, goal directed, Flight of ideas  Thought Associations: No loosening of associations  Thought Content: Normal  Perception: No  perceptual abnormalities noted  Sensorium: Alert and oriented to person, place, time and situation  Insight: Intact  Judgement: Intact  Cognition: Cognitively intact to conversational testing with respect to attention, orientation, fund of knowledge, recent and remote memory, and language  Testing: N/A    TIM-7/PHQ-9 scores reviewed: 8, 12 compared to 9, 17 reflecting a mild improvement in anxiety and further improvement with depression/mood.    Current Medications:  Current Outpatient Medications on File Prior to Visit   Medication Sig Dispense Refill    brexpiprazole 4 mg tablet Take 4 mg by mouth once daily. 60 tablet 11    hydrOXYzine HCL (Atarax) 25 mg tablet Take 1 tablet (25 mg) by mouth once daily as needed for anxiety. 90 tablet 3    naloxone (Narcan) 0.4 mg/mL injection Inject 1 mL (0.4 mg) into the muscle if needed for opioid reversal or respiratory depression.      sertraline (Zoloft) 100 mg tablet Take 2 tablets (200 mg) by mouth once daily 180 tablet 3    acetaminophen (Tylenol) 325 mg tablet Take 2 tablets (650 mg) by mouth every 6 hours.      albuterol (Ventolin HFA) 90 mcg/actuation inhaler Inhale 2 puffs every 6 hours if needed for wheezing. 18 g 1    atorvastatin (Lipitor) 20 mg tablet Take 1 tablet (20 mg) by mouth once daily. 30 tablet 5    Biktarvy -25 mg tablet Take 1 tablet by mouth once daily. 30 tablet 5    cyclobenzaprine (Flexeril) 5 mg tablet Take 1 tablet (5 mg) by mouth 3 times a day as needed for muscle spasms. 30 tablet 2    dextrose 5% parenteral solution 469.4 mL with ketamine 50 mg/mL solution 30 mg, lidocaine 10 mg/mL (1 %) solution 300 mg Infuse 30 mg into a venous catheter 1 time. Pain management      ergocalciferol (Vitamin D-2) 1.25 MG (86758 UT) capsule Take 1 capsule (1,250 mcg) by mouth 1 (one) time per week.      ibuprofen 600 mg tablet Take 1 tablet (600 mg) by mouth 3 times a day with meals. 90 tablet 2    ketorolac (Toradol) 30 mg/mL (1 mL) injection Infuse 1 mL  (30 mg) into a venous catheter 1 time. Part of pain management infusion      lidocaine (Lidoderm) 5 % patch Place 1 patch on the skin every 12 hours. (Leave on for 12 hours, then remove and leave off for 12 hours)      mv-min-FA-vit K-lutein-zeaxant (PreserVision AREDS 2 Plus MV) 200 mcg-15 mcg- 5 mg-1 mg capsule Take 1 capsule by mouth 2 times a day.      naproxen (Naprosyn) 500 mg tablet Take 1 tablet (500 mg) by mouth 2 times daily (morning and late afternoon).      nystatin (Mycostatin) 100,000 unit/gram powder Apply 1 Application topically 2 times a day. 60 g 1    polyethylene glycol (Glycolax, Miralax) 17 gram/dose powder Mix 17 g of powder and drink. 17 gram/dose powder      propofol (Diprivan) 10 mg/mL emulsion Infuse 5-20 mcg/kg/min into a venous catheter continuously. During pain management procedure only       No current facility-administered medications on file prior to visit.       Lab Review:   Office Visit on 02/07/2025   Component Date Value    Neisseria gonorrhea,Ampl* 02/07/2025 Negative     Chlamydia trachomatis, A* 02/07/2025 Negative     WBC 02/07/2025 8.4     nRBC 02/07/2025 0.0     RBC 02/07/2025 4.32     Hemoglobin 02/07/2025 13.8     Hematocrit 02/07/2025 42.5     MCV 02/07/2025 98     MCH 02/07/2025 31.9     MCHC 02/07/2025 32.5     RDW 02/07/2025 12.4     Platelets 02/07/2025 306     Neutrophils % 02/07/2025 43.8     Immature Granulocytes %,* 02/07/2025 0.2     Lymphocytes % 02/07/2025 44.0     Monocytes % 02/07/2025 9.8     Eosinophils % 02/07/2025 1.7     Basophils % 02/07/2025 0.5     Neutrophils Absolute 02/07/2025 3.67     Immature Granulocytes Ab* 02/07/2025 0.02     Lymphocytes Absolute 02/07/2025 3.69     Monocytes Absolute 02/07/2025 0.82     Eosinophils Absolute 02/07/2025 0.14     Basophils Absolute 02/07/2025 0.04     Glucose 02/07/2025 79     Sodium 02/07/2025 140     Potassium 02/07/2025 4.0     Chloride 02/07/2025 104     Bicarbonate 02/07/2025 25     Anion Gap 02/07/2025 15      Urea Nitrogen 02/07/2025 14     Creatinine 02/07/2025 0.81     eGFR 02/07/2025 >90     Calcium 02/07/2025 9.9     Albumin 02/07/2025 4.4     Alkaline Phosphatase 02/07/2025 69     Total Protein 02/07/2025 7.0     AST 02/07/2025 17     Bilirubin, Total 02/07/2025 0.6     ALT 02/07/2025 19     Hemoglobin A1C 02/07/2025 5.3     Estimated Average Glucose 02/07/2025 105     HIV-1 RNA PCR Viral Load* 02/07/2025      HIV RNA Result 02/07/2025 Not Detected     Cholesterol 02/07/2025 243 (H)     HDL-Cholesterol 02/07/2025 43.2     Cholesterol/HDL Ratio 02/07/2025 5.6     LDL Calculated 02/07/2025 146 (H)     VLDL 02/07/2025 54 (H)     Triglycerides 02/07/2025 270 (H)     Non HDL Cholesterol 02/07/2025 200 (H)     Syphilis Total Ab 02/07/2025 Reactive (A)     Trichomonas Vaginalis 02/07/2025 Negative     Lymphocyte Absolute 02/07/2025 3.69     CD3+CD4+% 02/07/2025 46     CD3+CD4+ Absolute 02/07/2025 1.697     CD3+CD8+% 02/07/2025 40 (H)     CD3+CD8+ Absolute 02/07/2025 1.476     CD4/CD8 Ratio 02/07/2025 1.15     CD3+CD4+ Absolute (/mm3) 02/07/2025 1,697     RPR  02/07/2025 Nonreactive     Treponema Confirm 02/07/2025 Nonreactive          Time Spent:    Prep time: 1 min.  Direct patient time: 30 min.  Documentation time: 6 min.  Total time: 37 min.    Next Appointment:  Follow up in 3 months (on 8/18/2025).

## 2025-05-22 ENCOUNTER — PHARMACY VISIT (OUTPATIENT)
Dept: PHARMACY | Facility: CLINIC | Age: 45
End: 2025-05-22

## 2025-05-23 ENCOUNTER — TELEPHONE (OUTPATIENT)
Dept: IMMUNOLOGY | Facility: CLINIC | Age: 45
End: 2025-05-23
Payer: MEDICARE

## 2025-05-23 NOTE — TELEPHONE ENCOUNTER
5-23-25    Ms. Chapa called and cancelled today's scheduled session. She rescheduled it for 5-27-25.    Kera Landry, [facility]  Mental Health Counselor

## 2025-05-27 ENCOUNTER — SOCIAL WORK (OUTPATIENT)
Dept: IMMUNOLOGY | Facility: CLINIC | Age: 45
End: 2025-05-27
Payer: MEDICARE

## 2025-05-27 NOTE — PROGRESS NOTES
"[facility]     Mental Health Counseling Session Update - Protected Health Information           Date:   5-27-25           Telehealth phone session with Ms. Chapa.                    Total  Time:   60 min         Progress:  Good                  /       Fair                                                                                                                       Compliant with Psych. Meds:  Yes       Therapeutic Modality:  Supportive                                                                   Factors regarding Medical treatment:   Multiple medical/ physical concerns/ limitations.                                                                                    Changes (if any) to Initial Assessment:                                                                             Treatment Modality:  Individual    Suicidal/ Homicidal Concerns:  No     Factors aiding or impeding psychotherapy progress:   came     Processed client's thoughts, feelings, and actions.  Looked at perceptions, assumptions, expectations, and realistic outcomes.  Examined thought, behavioral, emotional, and relationship patterns.  Discussed opportunities and \"unlevel-ness\" of society.   Focused on triggers, behaviors, and long term affects of trauma.  Explored ideas, goals, choices, awareness, discernment, and problem-solving.  Talked about continuing to establish and maintain limits/ boundaries despite push-back.  Encouraged re-engaging in self-care, routines, some social, outside, exercise, and mindfulness.  Reflected on client's life experiences and noted progress.  Provided support and empathetic understanding.                                                       Next Session:    6-3-25     Kera Landry [facility]  Mental Health Counselor  "

## 2025-06-03 ENCOUNTER — TELEPHONE (OUTPATIENT)
Dept: IMMUNOLOGY | Facility: CLINIC | Age: 45
End: 2025-06-03
Payer: MEDICARE

## 2025-06-03 NOTE — TELEPHONE ENCOUNTER
6-3-25    Ms. Chapa did not come in for today's scheduled session. Two phone attempts made, voice messages left for client.      Kera Landry, [facility]  Mental Health Counselor

## 2025-06-06 ENCOUNTER — TELEPHONE (OUTPATIENT)
Dept: IMMUNOLOGY | Facility: CLINIC | Age: 45
End: 2025-06-06
Payer: MEDICARE

## 2025-06-06 NOTE — TELEPHONE ENCOUNTER
6-6-25      Voice mail left for Ms. Chapa. Provided a session time on 6-625.    Kera Landry [facility]  Mental Health Counselor

## 2025-06-09 ENCOUNTER — TELEPHONE (OUTPATIENT)
Dept: IMMUNOLOGY | Facility: CLINIC | Age: 45
End: 2025-06-09
Payer: MEDICARE

## 2025-06-09 NOTE — TELEPHONE ENCOUNTER
6-9-25    Counseling appointment confirmation voice message received from Ms. Chapa. For a session time on 6-10-25.    Kera Landry, [facility]  Mental Health Counselor

## 2025-06-10 ENCOUNTER — SOCIAL WORK (OUTPATIENT)
Dept: IMMUNOLOGY | Facility: CLINIC | Age: 45
End: 2025-06-10
Payer: MEDICARE

## 2025-06-10 NOTE — PROGRESS NOTES
[facility]     Mental Health Counseling Session Update - Protected Health Information           Date:   6-10-25           In person session with Ms. Chapa.                    Total  Time:   60 min         Progress:  Good                  /       Fair                                                                                                                       Compliant with Psych. Meds:  Yes       Therapeutic Modality:  Supportive                                                                   Factors regarding Medical treatment:   Multiple medical/ physical concerns/ limitations.                                                                                    Changes (if any) to Initial Assessment:                                                                             Treatment Modality:  Individual    Suicidal/ Homicidal Concerns:  No     Factors aiding or impeding psychotherapy progress:   came     Processed client's thoughts, feelings, and actions.  Looked at perceptions, assumptions, expectations, and realistic outcomes.  Examined thought, behavioral, emotional, and relationship patterns.  Client shared more personal history, recent connections, and new awarenesses.  Discussed triggers, behaviors, and long term affects of trauma.  Explored ideas, goals, choices, awareness, discernment, and problem-solving.  Talked about continuing to establish and maintain limits/ boundaries despite push-back.  Encouraged re-engaging in self-care, routines, some social, outside, exercise, and mindfulness.  Reflected on client's life experiences and noted progress.  Provided support and empathetic understanding.                                                       Next Session:    6-24-25     Kera Landry [facility]  Mental Health Counselor

## 2025-06-24 ENCOUNTER — TELEPHONE (OUTPATIENT)
Dept: IMMUNOLOGY | Facility: CLINIC | Age: 45
End: 2025-06-24
Payer: MEDICARE

## 2025-06-24 NOTE — TELEPHONE ENCOUNTER
6-24-25    Ms. Chapa did not come in for today's scheduled session. Two phone attempts made, voice messages left for client.      Kera Landry, [facility]  Mental Health Counselor

## 2025-06-26 ENCOUNTER — TELEPHONE (OUTPATIENT)
Dept: IMMUNOLOGY | Facility: CLINIC | Age: 45
End: 2025-06-26
Payer: MEDICARE

## 2025-06-26 NOTE — TELEPHONE ENCOUNTER
6-26-25    Voice mail received from Ms. Chapa.  Return voice mail left for client.    Kera Landry, [facility]  Mental Health Counselor

## 2025-06-30 ENCOUNTER — TELEPHONE (OUTPATIENT)
Dept: IMMUNOLOGY | Facility: CLINIC | Age: 45
End: 2025-06-30
Payer: MEDICARE

## 2025-06-30 NOTE — TELEPHONE ENCOUNTER
6-30-25    Called and spoke with Ms. Chapa. Confirmed her counseling appointment on 7-1-25.    Kera Landry, [facility]  Mental Health Counselor

## 2025-07-01 ENCOUNTER — TELEPHONE (OUTPATIENT)
Dept: IMMUNOLOGY | Facility: CLINIC | Age: 45
End: 2025-07-01
Payer: MEDICARE

## 2025-07-01 PROCEDURE — RXMED WILLOW AMBULATORY MEDICATION CHARGE

## 2025-07-01 NOTE — TELEPHONE ENCOUNTER
7-1-25    Ms. Chapa did not come in for today's scheduled session. Called and spoke with client. She rescheduled her session for 7-3-25.    Kera Landry, [facility]  Mental Health Counselor

## 2025-07-02 ENCOUNTER — PHARMACY VISIT (OUTPATIENT)
Dept: PHARMACY | Facility: CLINIC | Age: 45
End: 2025-07-02
Payer: COMMERCIAL

## 2025-07-03 ENCOUNTER — SOCIAL WORK (OUTPATIENT)
Dept: IMMUNOLOGY | Facility: CLINIC | Age: 45
End: 2025-07-03
Payer: MEDICARE

## 2025-07-03 NOTE — PROGRESS NOTES
[facility]     Mental Health Counseling Session Update - Protected Health Information           Date:   7-3-25           Telehealth virtual session with Ms. Chapa.                    Total  Time:   60 min         Progress:  Good                  /       Fair                                                                                                                       Compliant with Psych. Meds:  Yes       Therapeutic Modality:  Supportive                                                                   Factors regarding Medical treatment:   Multiple medical/ physical concerns/ limitations.                                                                                    Changes (if any) to Initial Assessment:                                                                             Treatment Modality:  Individual    Suicidal/ Homicidal Concerns:  No     Factors aiding or impeding psychotherapy progress:   came     Processed client's thoughts, feelings, and actions.  Looked at perceptions, assumptions, expectations, and realistic outcomes.  Examined thought, behavioral, emotional, and relationship patterns.  Client shared more personal history, recent connections, and new awarenesses.  Discussed triggers, behaviors, and long term affects of trauma.  Explored ideas, goals, choices, awareness, discernment, and problem-solving.  Talked about continuing to establish and maintain limits/ boundaries despite push-back.  Encouraged re-engaging in self-care, routines, some social, outside, exercise, and mindfulness.  Reflected on client's life experiences and noted progress.  Provided support and empathetic understanding.                                                       Next Session:    7-8-25     Kera Landry [facility]  Mental Health Counselor

## 2025-07-08 ENCOUNTER — SOCIAL WORK (OUTPATIENT)
Dept: IMMUNOLOGY | Facility: CLINIC | Age: 45
End: 2025-07-08
Payer: COMMERCIAL

## 2025-07-08 NOTE — PROGRESS NOTES
[facility]     Mental Health Counseling Session Update - Protected Health Information           Date:   7-8-25           Telehealth virtual session with Ms. Chapa.                    Total  Time:   60 min         Progress:  Good                  /       Fair                                                                                                                       Compliant with Psych. Meds:  Yes       Therapeutic Modality:  Supportive                                                                   Factors regarding Medical treatment:   Multiple medical/ physical concerns/ limitations.                                                                                    Changes (if any) to Initial Assessment:                                                                             Treatment Modality:  Individual    Suicidal/ Homicidal Concerns:  No     Factors aiding or impeding psychotherapy progress:   came     Processed client's thoughts, feelings, and actions.  Looked at perceptions, assumptions, expectations, and realistic outcomes.  Examined thought, behavioral, emotional, and relationship patterns.  Client shared more personal history, recent connections, and new awarenesses.  Discussed triggers, behaviors, and long term affects of trauma.  Explored ideas, goals, choices, awareness, discernment, and problem-solving.  Talked about continuing to establish and maintain limits/ boundaries despite push-back.  Encouraged re-engaging in self-care, routines, some social, outside, exercise, and mindfulness.  Reflected on client's life experiences and noted progress.  Provided support and empathetic understanding.                                                       Next Session:    7-15-25     Kera Landry [facility]  Mental Health Counselor

## 2025-07-15 ENCOUNTER — SOCIAL WORK (OUTPATIENT)
Dept: IMMUNOLOGY | Facility: CLINIC | Age: 45
End: 2025-07-15
Payer: COMMERCIAL

## 2025-07-22 ENCOUNTER — TELEPHONE (OUTPATIENT)
Dept: IMMUNOLOGY | Facility: CLINIC | Age: 45
End: 2025-07-22
Payer: COMMERCIAL

## 2025-07-22 NOTE — TELEPHONE ENCOUNTER
7-22-25      Called and spoke with Ms. Chapa. Confirmed her counseling appointment on  7-23-25.    Kera Landry, [facility]  Mental Health Counselor

## 2025-07-23 ENCOUNTER — TELEPHONE (OUTPATIENT)
Dept: IMMUNOLOGY | Facility: CLINIC | Age: 45
End: 2025-07-23
Payer: COMMERCIAL

## 2025-07-23 NOTE — TELEPHONE ENCOUNTER
7-23-25    Voice mail received from Ms. Chapa. She cancelled today's scheduled session. Return voice mail left for client. Provided a rescheduled session time on 7-30-25.    Kera Landry, [facility]  Mental Health Counselor

## 2025-07-25 PROCEDURE — RXMED WILLOW AMBULATORY MEDICATION CHARGE

## 2025-07-30 ENCOUNTER — SOCIAL WORK (OUTPATIENT)
Dept: IMMUNOLOGY | Facility: CLINIC | Age: 45
End: 2025-07-30
Payer: MEDICARE

## 2025-07-30 NOTE — PROGRESS NOTES
[facility]     Mental Health Counseling Session Update - Protected Health Information           Date:   7-30-25           Telehealth virtual session with Ms. Chapa.                    Total  Time:   60 min         Progress:  Good                  /       Fair                                                                                                                       Compliant with Psych. Meds:  Yes       Therapeutic Modality:  Supportive                                                                   Factors regarding Medical treatment:   Multiple medical/ physical concerns/ limitations.                                                                                    Changes (if any) to Initial Assessment:                                                                             Treatment Modality:  Individual    Suicidal/ Homicidal Concerns:  No     Factors aiding or impeding psychotherapy progress:   came     Processed client's thoughts, feelings, and actions.  Looked at perceptions, assumptions, expectations, and realistic outcomes.  Examined thought, behavioral, emotional, and relationship patterns.  Client shared more personal history, recent connections, and new awarenesses.  Discussed triggers, behaviors, and long term affects of trauma.  Explored ideas, goals, choices, awareness, discernment, and problem-solving.  Talked about continuing to establish and maintain limits/ boundaries despite push-back.  Encouraged re-engaging in self-care, routines, some social, outside, exercise, and mindfulness.  Reflected on client's life experiences and noted progress.  Provided support and empathetic understanding.                                                       Next Session:    8-5-25     Kera Landry [facility]  Mental Health Counselor

## 2025-08-01 ENCOUNTER — PHARMACY VISIT (OUTPATIENT)
Dept: PHARMACY | Facility: CLINIC | Age: 45
End: 2025-08-01
Payer: COMMERCIAL

## 2025-08-01 ENCOUNTER — APPOINTMENT (OUTPATIENT)
Dept: IMMUNOLOGY | Facility: CLINIC | Age: 45
End: 2025-08-01
Payer: COMMERCIAL

## 2025-08-05 ENCOUNTER — TELEPHONE (OUTPATIENT)
Dept: IMMUNOLOGY | Facility: CLINIC | Age: 45
End: 2025-08-05
Payer: MEDICARE

## 2025-08-05 NOTE — TELEPHONE ENCOUNTER
8-5-25    Ms. Chapa did not log into the virtual telehealth platform link for today's scheduled telehealth session. Two phone attempts made, voice messages left for client.    She returned the call and rescheduled her session for 8-8-25.    Kera Landry, [facility]  Mental Health Counselor

## 2025-08-08 ENCOUNTER — SOCIAL WORK (OUTPATIENT)
Dept: IMMUNOLOGY | Facility: CLINIC | Age: 45
End: 2025-08-08
Payer: MEDICARE

## 2025-08-08 NOTE — PROGRESS NOTES
[facility]     Mental Health Counseling Session Update - Protected Health Information           Date:   8-8-25           Telehealth virtual session with Ms. Chapa.                    Total  Time:   60 min         Progress:  Good                  /       Fair                                                                                                                       Compliant with Psych. Meds:  Yes       Therapeutic Modality:  Supportive                                                                   Factors regarding Medical treatment:   Multiple medical/ physical concerns/ limitations.                                                                                    Changes (if any) to Initial Assessment:                                                                             Treatment Modality:  Individual    Suicidal/ Homicidal Concerns:  No     Factors aiding or impeding psychotherapy progress:   came     Processed client's thoughts, feelings, and actions.  Looked at perceptions, assumptions, expectations, and realistic outcomes.  Examined thought, behavioral, emotional, and relationship patterns.  Client shared recent realizations and new awarenesses.  Discussed triggers, behaviors, and long term affects of trauma.  Explored ideas, goals, choices, awareness, discernment, and problem-solving.  Talked about continuing to establish and maintain limits/ boundaries despite push-back.  Encouraged re-engaging in self-care, routines, some social, outside, exercise, and mindfulness.  Reflected on client's life experiences and noted progress.  Provided support and empathetic understanding.                                                       Next Session:    8-12-25     Kera Landry [facility]  Mental Health Counselor

## 2025-08-12 ENCOUNTER — SOCIAL WORK (OUTPATIENT)
Dept: IMMUNOLOGY | Facility: CLINIC | Age: 45
End: 2025-08-12
Payer: MEDICARE

## 2025-08-18 ENCOUNTER — APPOINTMENT (OUTPATIENT)
Dept: BEHAVIORAL HEALTH | Facility: CLINIC | Age: 45
End: 2025-08-18
Payer: MEDICARE

## 2025-08-18 DIAGNOSIS — F42.8 OBSESSIVE THINKING: ICD-10-CM

## 2025-08-18 DIAGNOSIS — R45.4 IRRITABILITY AND ANGER: ICD-10-CM

## 2025-08-18 DIAGNOSIS — F44.9 DISASSOCIATION DISORDER: ICD-10-CM

## 2025-08-18 DIAGNOSIS — F43.10 PTSD (POST-TRAUMATIC STRESS DISORDER): ICD-10-CM

## 2025-08-18 DIAGNOSIS — F41.1 GAD (GENERALIZED ANXIETY DISORDER): Primary | ICD-10-CM

## 2025-08-18 DIAGNOSIS — F31.81 BIPOLAR 2 DISORDER (MULTI): ICD-10-CM

## 2025-08-18 PROCEDURE — 99214 OFFICE O/P EST MOD 30 MIN: CPT | Performed by: NURSE PRACTITIONER

## 2025-08-18 PROCEDURE — 4274F FLU IMMUNO ADMIND RCVD: CPT | Performed by: NURSE PRACTITIONER

## 2025-08-18 ASSESSMENT — ANXIETY QUESTIONNAIRES
IF YOU CHECKED OFF ANY PROBLEMS ON THIS QUESTIONNAIRE, HOW DIFFICULT HAVE THESE PROBLEMS MADE IT FOR YOU TO DO YOUR WORK, TAKE CARE OF THINGS AT HOME, OR GET ALONG WITH OTHER PEOPLE: VERY DIFFICULT
7. FEELING AFRAID AS IF SOMETHING AWFUL MIGHT HAPPEN: NOT AT ALL
5. BEING SO RESTLESS THAT IT IS HARD TO SIT STILL: MORE THAN HALF THE DAYS
2. NOT BEING ABLE TO STOP OR CONTROL WORRYING: SEVERAL DAYS
GAD7 TOTAL SCORE: 11
4. TROUBLE RELAXING: NEARLY EVERY DAY
1. FEELING NERVOUS, ANXIOUS, OR ON EDGE: SEVERAL DAYS
3. WORRYING TOO MUCH ABOUT DIFFERENT THINGS: MORE THAN HALF THE DAYS
6. BECOMING EASILY ANNOYED OR IRRITABLE: MORE THAN HALF THE DAYS

## 2025-08-18 ASSESSMENT — PATIENT HEALTH QUESTIONNAIRE - PHQ9
1. LITTLE INTEREST OR PLEASURE IN DOING THINGS: MORE THAN HALF THE DAYS
4. FEELING TIRED OR HAVING LITTLE ENERGY: MORE THAN HALF THE DAYS
9. THOUGHTS THAT YOU WOULD BE BETTER OFF DEAD, OR OF HURTING YOURSELF: NOT AT ALL
8. MOVING OR SPEAKING SO SLOWLY THAT OTHER PEOPLE COULD HAVE NOTICED. OR THE OPPOSITE, BEING SO FIGETY OR RESTLESS THAT YOU HAVE BEEN MOVING AROUND A LOT MORE THAN USUAL: NOT AT ALL
6. FEELING BAD ABOUT YOURSELF - OR THAT YOU ARE A FAILURE OR HAVE LET YOURSELF OR YOUR FAMILY DOWN: SEVERAL DAYS
10. IF YOU CHECKED OFF ANY PROBLEMS, HOW DIFFICULT HAVE THESE PROBLEMS MADE IT FOR YOU TO DO YOUR WORK, TAKE CARE OF THINGS AT HOME, OR GET ALONG WITH OTHER PEOPLE: VERY DIFFICULT
7. TROUBLE CONCENTRATING ON THINGS, SUCH AS READING THE NEWSPAPER OR WATCHING TELEVISION: MORE THAN HALF THE DAYS
3. TROUBLE FALLING OR STAYING ASLEEP OR SLEEPING TOO MUCH: SEVERAL DAYS
5. POOR APPETITE OR OVEREATING: SEVERAL DAYS
2. FEELING DOWN, DEPRESSED OR HOPELESS: SEVERAL DAYS

## 2025-08-18 ASSESSMENT — ENCOUNTER SYMPTOMS: NERVOUS/ANXIOUS: 1

## 2025-08-19 ENCOUNTER — TELEPHONE (OUTPATIENT)
Dept: IMMUNOLOGY | Facility: CLINIC | Age: 45
End: 2025-08-19
Payer: MEDICARE

## 2025-08-20 ENCOUNTER — SOCIAL WORK (OUTPATIENT)
Dept: IMMUNOLOGY | Facility: CLINIC | Age: 45
End: 2025-08-20
Payer: MEDICARE

## 2025-08-26 ENCOUNTER — SOCIAL WORK (OUTPATIENT)
Dept: IMMUNOLOGY | Facility: CLINIC | Age: 45
End: 2025-08-26
Payer: COMMERCIAL

## 2025-08-26 DIAGNOSIS — Z21 HIV INFECTION, UNSPECIFIED SYMPTOM STATUS: ICD-10-CM

## 2025-08-26 DIAGNOSIS — Z79.899 HIGH RISK MEDICATION USE: ICD-10-CM

## 2025-08-26 DIAGNOSIS — E66.9 OBESITY (BMI 30-39.9): ICD-10-CM

## 2025-08-26 DIAGNOSIS — E13.9 OTHER SPECIFIED DIABETES MELLITUS WITHOUT COMPLICATION, WITHOUT LONG-TERM CURRENT USE OF INSULIN: ICD-10-CM

## 2025-08-26 DIAGNOSIS — E55.9 VITAMIN D DEFICIENCY: ICD-10-CM

## 2025-08-26 DIAGNOSIS — E78.5 HYPERLIPIDEMIA, UNSPECIFIED HYPERLIPIDEMIA TYPE: ICD-10-CM

## 2025-08-26 DIAGNOSIS — Z11.3 ROUTINE SCREENING FOR STI (SEXUALLY TRANSMITTED INFECTION): ICD-10-CM

## 2025-09-02 ENCOUNTER — TELEPHONE (OUTPATIENT)
Dept: IMMUNOLOGY | Facility: CLINIC | Age: 45
End: 2025-09-02
Payer: COMMERCIAL

## 2025-09-02 ENCOUNTER — APPOINTMENT (OUTPATIENT)
Dept: IMMUNOLOGY | Facility: CLINIC | Age: 45
End: 2025-09-02
Payer: COMMERCIAL

## 2025-09-03 ENCOUNTER — TELEPHONE (OUTPATIENT)
Dept: IMMUNOLOGY | Facility: CLINIC | Age: 45
End: 2025-09-03
Payer: COMMERCIAL

## 2025-09-03 ENCOUNTER — CLINICAL SUPPORT (OUTPATIENT)
Dept: IMMUNOLOGY | Facility: CLINIC | Age: 45
End: 2025-09-03
Payer: COMMERCIAL

## 2025-09-03 DIAGNOSIS — E66.9 OBESITY (BMI 30-39.9): ICD-10-CM

## 2025-09-03 DIAGNOSIS — Z79.899 HIGH RISK MEDICATION USE: ICD-10-CM

## 2025-09-03 DIAGNOSIS — Z11.3 ROUTINE SCREENING FOR STI (SEXUALLY TRANSMITTED INFECTION): ICD-10-CM

## 2025-09-03 DIAGNOSIS — Z21 HIV INFECTION, UNSPECIFIED SYMPTOM STATUS: ICD-10-CM

## 2025-09-03 DIAGNOSIS — E13.9 OTHER SPECIFIED DIABETES MELLITUS WITHOUT COMPLICATION, WITHOUT LONG-TERM CURRENT USE OF INSULIN: ICD-10-CM

## 2025-09-03 DIAGNOSIS — E55.9 VITAMIN D DEFICIENCY: ICD-10-CM

## 2025-09-03 DIAGNOSIS — E78.5 HYPERLIPIDEMIA, UNSPECIFIED HYPERLIPIDEMIA TYPE: ICD-10-CM

## 2025-09-03 LAB
25(OH)D3 SERPL-MCNC: 25 NG/ML (ref 30–100)
BASOPHILS # BLD AUTO: 0.04 X10*3/UL (ref 0–0.1)
BASOPHILS # BLD AUTO: 0.05 X10*3/UL (ref 0–0.1)
BASOPHILS NFR BLD AUTO: 0.5 %
BASOPHILS NFR BLD AUTO: 0.6 %
CD3+CD4+ CELLS # BLD: 1.78 X10E9/L (ref 0.35–2.74)
CD3+CD4+ CELLS # BLD: 1777 /MM3 (ref 350–2740)
CD3+CD4+ CELLS NFR BLD: 47 % (ref 29–57)
CD3+CD4+ CELLS/CD3+CD8+ CLL BLD: 1.27 % (ref 1–3.5)
CD3+CD8+ CELLS # BLD: 1.4 X10E9/L (ref 0.08–1.49)
CD3+CD8+ CELLS NFR BLD: 37 % (ref 7–31)
EOSINOPHIL # BLD AUTO: 0.13 X10*3/UL (ref 0–0.7)
EOSINOPHIL # BLD AUTO: 0.15 X10*3/UL (ref 0–0.7)
EOSINOPHIL NFR BLD AUTO: 1.5 %
EOSINOPHIL NFR BLD AUTO: 1.7 %
ERYTHROCYTE [DISTWIDTH] IN BLOOD BY AUTOMATED COUNT: 12.2 % (ref 11.5–14.5)
ERYTHROCYTE [DISTWIDTH] IN BLOOD BY AUTOMATED COUNT: 12.2 % (ref 11.5–14.5)
EST. AVERAGE GLUCOSE BLD GHB EST-MCNC: 111 MG/DL
HBA1C MFR BLD: 5.5 % (ref ?–5.7)
HCT VFR BLD AUTO: 39 % (ref 36–46)
HCT VFR BLD AUTO: 40 % (ref 36–46)
HGB BLD-MCNC: 13.1 G/DL (ref 12–16)
HGB BLD-MCNC: 13.2 G/DL (ref 12–16)
IMM GRANULOCYTES # BLD AUTO: 0.02 X10*3/UL (ref 0–0.7)
IMM GRANULOCYTES # BLD AUTO: 0.03 X10*3/UL (ref 0–0.7)
IMM GRANULOCYTES NFR BLD AUTO: 0.2 % (ref 0–0.9)
IMM GRANULOCYTES NFR BLD AUTO: 0.3 % (ref 0–0.9)
LYMPHOCYTES # BLD AUTO: 3.78 X10*3/UL (ref 1.2–4.8)
LYMPHOCYTES # BLD AUTO: 3.88 X10*3/UL (ref 1.2–4.8)
LYMPHOCYTES # SPEC AUTO: 3.78 X10*3/UL
LYMPHOCYTES NFR BLD AUTO: 43.4 %
LYMPHOCYTES NFR BLD AUTO: 43.8 %
MCH RBC QN AUTO: 32.5 PG (ref 26–34)
MCH RBC QN AUTO: 32.8 PG (ref 26–34)
MCHC RBC AUTO-ENTMCNC: 33 G/DL (ref 32–36)
MCHC RBC AUTO-ENTMCNC: 33.6 G/DL (ref 32–36)
MCV RBC AUTO: 98 FL (ref 80–100)
MCV RBC AUTO: 99 FL (ref 80–100)
MONOCYTES # BLD AUTO: 0.67 X10*3/UL (ref 0.1–1)
MONOCYTES # BLD AUTO: 0.72 X10*3/UL (ref 0.1–1)
MONOCYTES NFR BLD AUTO: 7.8 %
MONOCYTES NFR BLD AUTO: 8.1 %
NEUTROPHILS # BLD AUTO: 3.99 X10*3/UL (ref 1.2–7.7)
NEUTROPHILS # BLD AUTO: 4.11 X10*3/UL (ref 1.2–7.7)
NEUTROPHILS NFR BLD AUTO: 45.9 %
NEUTROPHILS NFR BLD AUTO: 46.2 %
NRBC BLD-RTO: 0 /100 WBCS (ref 0–0)
NRBC BLD-RTO: 0 /100 WBCS (ref 0–0)
PLATELET # BLD AUTO: 297 X10*3/UL (ref 150–450)
PLATELET # BLD AUTO: 315 X10*3/UL (ref 150–450)
RBC # BLD AUTO: 4 X10*6/UL (ref 4–5.2)
RBC # BLD AUTO: 4.06 X10*6/UL (ref 4–5.2)
TREPONEMA PALLIDUM IGG+IGM AB [PRESENCE] IN SERUM OR PLASMA BY IMMUNOASSAY: NONREACTIVE
WBC # BLD AUTO: 8.6 X10*3/UL (ref 4.4–11.3)
WBC # BLD AUTO: 8.9 X10*3/UL (ref 4.4–11.3)

## 2025-09-03 PROCEDURE — 36415 COLL VENOUS BLD VENIPUNCTURE: CPT

## 2025-09-03 PROCEDURE — 87536 HIV-1 QUANT&REVRSE TRNSCRPJ: CPT

## 2025-09-03 PROCEDURE — 87491 CHLMYD TRACH DNA AMP PROBE: CPT

## 2025-09-03 PROCEDURE — 86780 TREPONEMA PALLIDUM: CPT

## 2025-09-03 PROCEDURE — 83036 HEMOGLOBIN GLYCOSYLATED A1C: CPT

## 2025-09-03 PROCEDURE — 87661 TRICHOMONAS VAGINALIS AMPLIF: CPT

## 2025-09-03 PROCEDURE — 85025 COMPLETE CBC W/AUTO DIFF WBC: CPT

## 2025-09-03 PROCEDURE — 88184 FLOWCYTOMETRY/ TC 1 MARKER: CPT

## 2025-09-03 PROCEDURE — 82306 VITAMIN D 25 HYDROXY: CPT

## 2025-09-03 PROCEDURE — 80061 LIPID PANEL: CPT

## 2025-09-03 PROCEDURE — 80053 COMPREHEN METABOLIC PANEL: CPT

## 2025-09-04 LAB
C TRACH RRNA SPEC QL NAA+PROBE: NEGATIVE
HIV1 RNA # PLAS NAA DL=20: NOT DETECTED {COPIES}/ML
HIV1 RNA SPEC NAA+PROBE-LOG#: NORMAL {LOG_COPIES}/ML
N GONORRHOEA DNA SPEC QL PROBE+SIG AMP: NEGATIVE
T VAGINALIS RRNA SPEC QL NAA+PROBE: NEGATIVE

## 2025-09-05 DIAGNOSIS — Z21 HIV INFECTION, UNSPECIFIED SYMPTOM STATUS: ICD-10-CM

## 2025-09-05 DIAGNOSIS — E78.5 DYSLIPIDEMIA: ICD-10-CM

## 2025-09-05 LAB
ALBUMIN SERPL BCP-MCNC: 4.3 G/DL (ref 3.4–5)
ALP SERPL-CCNC: 70 U/L (ref 33–110)
ALT SERPL W P-5'-P-CCNC: 28 U/L (ref 7–45)
ANION GAP SERPL CALC-SCNC: 12 MMOL/L (ref 10–20)
AST SERPL W P-5'-P-CCNC: 19 U/L (ref 9–39)
BILIRUB SERPL-MCNC: 0.5 MG/DL (ref 0–1.2)
BUN SERPL-MCNC: 14 MG/DL (ref 6–23)
CALCIUM SERPL-MCNC: 9.5 MG/DL (ref 8.6–10.6)
CHLORIDE SERPL-SCNC: 104 MMOL/L (ref 98–107)
CHOLEST SERPL-MCNC: 264 MG/DL (ref 0–199)
CHOLESTEROL/HDL RATIO: 8.1
CO2 SERPL-SCNC: 26 MMOL/L (ref 21–32)
CREAT SERPL-MCNC: 0.74 MG/DL (ref 0.5–1.05)
EGFRCR SERPLBLD CKD-EPI 2021: >90 ML/MIN/1.73M*2
GLUCOSE SERPL-MCNC: 114 MG/DL (ref 74–99)
HDLC SERPL-MCNC: 32.7 MG/DL
LDLC SERPL CALC-MCNC: ABNORMAL MG/DL
NON HDL CHOLESTEROL: 231 MG/DL (ref 0–149)
POTASSIUM SERPL-SCNC: 4 MMOL/L (ref 3.5–5.3)
PROT SERPL-MCNC: 6.5 G/DL (ref 6.4–8.2)
SODIUM SERPL-SCNC: 138 MMOL/L (ref 136–145)
TRIGL SERPL-MCNC: 564 MG/DL (ref 0–149)
VLDL: ABNORMAL

## 2025-09-05 RX ORDER — ATORVASTATIN CALCIUM 20 MG/1
20 TABLET, FILM COATED ORAL DAILY
Qty: 30 TABLET | Refills: 3 | Status: SHIPPED | OUTPATIENT
Start: 2025-09-05

## 2025-09-05 RX ORDER — BICTEGRAVIR SODIUM, EMTRICITABINE, AND TENOFOVIR ALAFENAMIDE FUMARATE 50; 200; 25 MG/1; MG/1; MG/1
1 TABLET ORAL DAILY
Qty: 30 TABLET | Refills: 3 | Status: SHIPPED | OUTPATIENT
Start: 2025-09-05

## 2025-10-20 ENCOUNTER — APPOINTMENT (OUTPATIENT)
Dept: OPHTHALMOLOGY | Facility: CLINIC | Age: 45
End: 2025-10-20
Payer: COMMERCIAL

## 2025-11-24 ENCOUNTER — APPOINTMENT (OUTPATIENT)
Dept: BEHAVIORAL HEALTH | Facility: CLINIC | Age: 45
End: 2025-11-24
Payer: MEDICARE